# Patient Record
Sex: FEMALE | Race: WHITE | NOT HISPANIC OR LATINO | Employment: UNEMPLOYED | URBAN - METROPOLITAN AREA
[De-identification: names, ages, dates, MRNs, and addresses within clinical notes are randomized per-mention and may not be internally consistent; named-entity substitution may affect disease eponyms.]

---

## 2017-12-03 ENCOUNTER — HOSPITAL ENCOUNTER (EMERGENCY)
Facility: HOSPITAL | Age: 57
Discharge: HOME | End: 2017-12-03
Attending: EMERGENCY MEDICINE
Payer: COMMERCIAL

## 2017-12-03 VITALS
HEART RATE: 89 BPM | SYSTOLIC BLOOD PRESSURE: 174 MMHG | OXYGEN SATURATION: 93 % | RESPIRATION RATE: 20 BRPM | DIASTOLIC BLOOD PRESSURE: 78 MMHG | TEMPERATURE: 98 F

## 2017-12-03 DIAGNOSIS — J11.1 INFLUENZA: ICD-10-CM

## 2017-12-03 DIAGNOSIS — J01.00 ACUTE MAXILLARY SINUSITIS, RECURRENCE NOT SPECIFIED: Primary | ICD-10-CM

## 2017-12-03 DIAGNOSIS — J40 BRONCHITIS: ICD-10-CM

## 2017-12-03 DIAGNOSIS — R07.9 CHEST PAIN: ICD-10-CM

## 2017-12-03 DIAGNOSIS — R06.02 SOB (SHORTNESS OF BREATH): ICD-10-CM

## 2017-12-03 DIAGNOSIS — R06.2 WHEEZING: ICD-10-CM

## 2017-12-03 LAB
ALBUMIN SERPL BCP-MCNC: 3.8 G/DL
ALP SERPL-CCNC: 94 U/L
ALT SERPL W/O P-5'-P-CCNC: 23 U/L
ANION GAP SERPL CALC-SCNC: 11 MMOL/L
AST SERPL-CCNC: 14 U/L
BASOPHILS # BLD AUTO: 0.02 K/UL
BASOPHILS NFR BLD: 0.3 %
BILIRUB SERPL-MCNC: 0.2 MG/DL
BUN SERPL-MCNC: 12 MG/DL
CALCIUM SERPL-MCNC: 8.7 MG/DL
CHLORIDE SERPL-SCNC: 102 MMOL/L
CO2 SERPL-SCNC: 23 MMOL/L
CREAT SERPL-MCNC: 0.61 MG/DL
DIFFERENTIAL METHOD: ABNORMAL
EOSINOPHIL # BLD AUTO: 0 K/UL
EOSINOPHIL NFR BLD: 0 %
ERYTHROCYTE [DISTWIDTH] IN BLOOD BY AUTOMATED COUNT: 16.8 %
EST. GFR  (AFRICAN AMERICAN): >60 ML/MIN/1.73 M^2
EST. GFR  (NON AFRICAN AMERICAN): >60 ML/MIN/1.73 M^2
GLUCOSE SERPL-MCNC: 151 MG/DL
HCT VFR BLD AUTO: 33.6 %
HGB BLD-MCNC: 10.5 G/DL
LYMPHOCYTES # BLD AUTO: 0.2 K/UL
LYMPHOCYTES NFR BLD: 2.8 %
MCH RBC QN AUTO: 25.1 PG
MCHC RBC AUTO-ENTMCNC: 31.3 G/DL
MCV RBC AUTO: 80 FL
MONOCYTES # BLD AUTO: 0.3 K/UL
MONOCYTES NFR BLD: 5 %
NEUTROPHILS # BLD AUTO: 5.8 K/UL
NEUTROPHILS NFR BLD: 91.6 %
NT-PROBNP: 312 PG/ML
PLATELET # BLD AUTO: 221 K/UL
PMV BLD AUTO: 10.3 FL
POTASSIUM SERPL-SCNC: 3 MMOL/L
PROT SERPL-MCNC: 7 G/DL
RBC # BLD AUTO: 4.18 M/UL
SODIUM SERPL-SCNC: 136 MMOL/L
TROPONIN I SERPL DL<=0.01 NG/ML-MCNC: <0.012 NG/ML
WBC # BLD AUTO: 6.35 K/UL

## 2017-12-03 PROCEDURE — 93010 ELECTROCARDIOGRAM REPORT: CPT | Mod: ,,, | Performed by: INTERNAL MEDICINE

## 2017-12-03 PROCEDURE — 96365 THER/PROPH/DIAG IV INF INIT: CPT

## 2017-12-03 PROCEDURE — 25000003 PHARM REV CODE 250: Performed by: EMERGENCY MEDICINE

## 2017-12-03 PROCEDURE — 85025 COMPLETE CBC W/AUTO DIFF WBC: CPT

## 2017-12-03 PROCEDURE — 99285 EMERGENCY DEPT VISIT HI MDM: CPT | Mod: 25

## 2017-12-03 PROCEDURE — 83880 ASSAY OF NATRIURETIC PEPTIDE: CPT

## 2017-12-03 PROCEDURE — 80053 COMPREHEN METABOLIC PANEL: CPT

## 2017-12-03 PROCEDURE — 96361 HYDRATE IV INFUSION ADD-ON: CPT

## 2017-12-03 PROCEDURE — 84484 ASSAY OF TROPONIN QUANT: CPT

## 2017-12-03 PROCEDURE — 96375 TX/PRO/DX INJ NEW DRUG ADDON: CPT

## 2017-12-03 PROCEDURE — 63600175 PHARM REV CODE 636 W HCPCS: Performed by: EMERGENCY MEDICINE

## 2017-12-03 PROCEDURE — 93005 ELECTROCARDIOGRAM TRACING: CPT

## 2017-12-03 PROCEDURE — 96366 THER/PROPH/DIAG IV INF ADDON: CPT

## 2017-12-03 PROCEDURE — 63600175 PHARM REV CODE 636 W HCPCS

## 2017-12-03 RX ORDER — MORPHINE SULFATE 2 MG/ML
4 INJECTION, SOLUTION INTRAMUSCULAR; INTRAVENOUS
Status: COMPLETED | OUTPATIENT
Start: 2017-12-03 | End: 2017-12-03

## 2017-12-03 RX ORDER — PREDNISONE 50 MG/1
50 TABLET ORAL DAILY
Qty: 10 TABLET | Refills: 0 | Status: SHIPPED | OUTPATIENT
Start: 2017-12-03 | End: 2017-12-08

## 2017-12-03 RX ORDER — ALBUTEROL SULFATE 90 UG/1
1-2 AEROSOL, METERED RESPIRATORY (INHALATION) EVERY 6 HOURS PRN
Qty: 1 INHALER | Refills: 0 | Status: SHIPPED | OUTPATIENT
Start: 2017-12-03 | End: 2020-09-17 | Stop reason: SDUPTHER

## 2017-12-03 RX ORDER — POTASSIUM CHLORIDE 20 MEQ/15ML
60 SOLUTION ORAL
Status: COMPLETED | OUTPATIENT
Start: 2017-12-03 | End: 2017-12-03

## 2017-12-03 RX ORDER — MORPHINE SULFATE 10 MG/ML
INJECTION INTRAMUSCULAR; INTRAVENOUS; SUBCUTANEOUS
Status: COMPLETED
Start: 2017-12-03 | End: 2017-12-03

## 2017-12-03 RX ORDER — ACETAMINOPHEN 500 MG
1000 TABLET ORAL
Status: COMPLETED | OUTPATIENT
Start: 2017-12-03 | End: 2017-12-03

## 2017-12-03 RX ORDER — ONDANSETRON 2 MG/ML
4 INJECTION INTRAMUSCULAR; INTRAVENOUS
Status: COMPLETED | OUTPATIENT
Start: 2017-12-03 | End: 2017-12-03

## 2017-12-03 RX ORDER — OSELTAMIVIR PHOSPHATE 75 MG/1
75 CAPSULE ORAL 2 TIMES DAILY
Qty: 10 CAPSULE | Refills: 0 | Status: SHIPPED | OUTPATIENT
Start: 2017-12-03 | End: 2017-12-08

## 2017-12-03 RX ORDER — MAGNESIUM SULFATE 1 G/100ML
2 INJECTION INTRAVENOUS ONCE
Status: COMPLETED | OUTPATIENT
Start: 2017-12-03 | End: 2017-12-03

## 2017-12-03 RX ORDER — BENZONATATE 100 MG/1
100 CAPSULE ORAL 3 TIMES DAILY PRN
Qty: 20 CAPSULE | Refills: 0 | Status: SHIPPED | OUTPATIENT
Start: 2017-12-03 | End: 2017-12-13

## 2017-12-03 RX ORDER — LORATADINE 10 MG/1
10 TABLET ORAL DAILY
Qty: 60 TABLET | Refills: 0 | Status: SHIPPED | OUTPATIENT
Start: 2017-12-03 | End: 2024-01-12

## 2017-12-03 RX ORDER — AZITHROMYCIN 250 MG/1
250 TABLET, FILM COATED ORAL DAILY
Qty: 6 TABLET | Refills: 0 | Status: SHIPPED | OUTPATIENT
Start: 2017-12-03 | End: 2017-12-13

## 2017-12-03 RX ORDER — HYDROCODONE BITARTRATE AND ACETAMINOPHEN 5; 325 MG/1; MG/1
1 TABLET ORAL EVERY 4 HOURS PRN
Qty: 18 TABLET | Refills: 0 | Status: ON HOLD | OUTPATIENT
Start: 2017-12-03 | End: 2021-06-30

## 2017-12-03 RX ADMIN — ACETAMINOPHEN 1000 MG: 500 TABLET ORAL at 12:12

## 2017-12-03 RX ADMIN — SODIUM CHLORIDE 1000 ML: 0.9 INJECTION, SOLUTION INTRAVENOUS at 12:12

## 2017-12-03 RX ADMIN — MAGNESIUM SULFATE 1 G: 1 INJECTION INTRAVENOUS at 03:12

## 2017-12-03 RX ADMIN — MORPHINE SULFATE 4 MG: 10 INJECTION INTRAVENOUS at 03:12

## 2017-12-03 RX ADMIN — POTASSIUM CHLORIDE 60 MEQ: 20 SOLUTION ORAL at 03:12

## 2017-12-03 RX ADMIN — ONDANSETRON 4 MG: 2 INJECTION INTRAMUSCULAR; INTRAVENOUS at 03:12

## 2017-12-03 NOTE — ED PROVIDER NOTES
Encounter Date: 12/3/2017       History     Chief Complaint   Patient presents with    Shortness of Breath    Sore Throat    Chills    Cough    Headache    Fever    Influenza    Nausea    Vomiting    Weakness     Patient is since the ER from urgent care for one day history of cough fever shortness of breath.  Patient tested positive for influenza and wheezing on exam and EMS was called en route patient received Solu-Medrol and 3 nebs treatments.  Patient also reports having a left sinus pain for 2 weeks.  Patient denies chest pain edema vomiting or diarrhea.          Review of patient's allergies indicates:   Allergen Reactions    Penicillins Hives     No past medical history on file.  Past Surgical History:   Procedure Laterality Date    gastric by pass      HYSTERECTOMY      OOPHORECTOMY Right      No family history on file.  Social History   Substance Use Topics    Smoking status: Former Smoker    Smokeless tobacco: Never Used    Alcohol use No     Review of Systems   Constitutional: Positive for fever. Negative for chills, diaphoresis and fatigue.   HENT: Positive for congestion, sinus pain and sinus pressure. Negative for sore throat.    Respiratory: Positive for cough, shortness of breath and wheezing. Negative for apnea, choking, chest tightness and stridor.    Cardiovascular: Negative for chest pain, palpitations and leg swelling.   Gastrointestinal: Negative for abdominal distention, abdominal pain, diarrhea, nausea and vomiting.   Genitourinary: Negative for dysuria.   Musculoskeletal: Negative for back pain, neck pain and neck stiffness.   Skin: Negative for rash.   Neurological: Negative for weakness.   Hematological: Does not bruise/bleed easily.   All other systems reviewed and are negative.      Physical Exam     Initial Vitals [12/03/17 1240]   BP Pulse Resp Temp SpO2   (!) 172/81 (!) 115 (!) 22 (!) 102.6 °F (39.2 °C) (!) 92 %      MAP       111.33         Physical  Exam    Constitutional: She appears well-developed and well-nourished. She is not diaphoretic. She appears distressed.   HENT:   Head: Normocephalic and atraumatic.   Eyes: Conjunctivae and EOM are normal. Pupils are equal, round, and reactive to light.   Neck: Normal range of motion. Neck supple.   Neck supple   Cardiovascular: Normal rate, regular rhythm and normal heart sounds.   Pulmonary/Chest: No respiratory distress. She has wheezes. She has no rhonchi. She exhibits no tenderness.   Abdominal: Soft. She exhibits no distension and no mass. There is no tenderness. There is no rebound and no guarding.   Musculoskeletal: Normal range of motion. She exhibits no edema or tenderness.   Neurological: She is alert and oriented to person, place, and time. No cranial nerve deficit.   Skin: Skin is warm.         ED Course   Procedures  Labs Reviewed   CBC W/ AUTO DIFFERENTIAL - Abnormal; Notable for the following:        Result Value    Hemoglobin 10.5 (*)     Hematocrit 33.6 (*)     MCV 80 (*)     MCH 25.1 (*)     MCHC 31.3 (*)     RDW 16.8 (*)     Lymph # 0.2 (*)     Gran% 91.6 (*)     Lymph% 2.8 (*)     All other components within normal limits   COMPREHENSIVE METABOLIC PANEL - Abnormal; Notable for the following:     Potassium 3.0 (*)     Glucose 151 (*)     AST 14 (*)     All other components within normal limits   TROPONIN I   NT-PRO NATRIURETIC PEPTIDE        Imaging Results          X-Ray Chest 1 View (Final result)  Result time 12/03/17 13:14:13    Final result by Jesus Caceres MD (12/03/17 13:14:13)                 Impression:     Negative      Electronically signed by: JESUS CACERES MD  Date:     12/03/17  Time:    13:14              Narrative:    History: Shortness of breath    Normal heart size. No infiltrates seen.                            Labs Reviewed   CBC W/ AUTO DIFFERENTIAL - Abnormal; Notable for the following:        Result Value    Hemoglobin 10.5 (*)     Hematocrit 33.6 (*)     MCV 80 (*)      MCH 25.1 (*)     MCHC 31.3 (*)     RDW 16.8 (*)     Lymph # 0.2 (*)     Gran% 91.6 (*)     Lymph% 2.8 (*)     All other components within normal limits   COMPREHENSIVE METABOLIC PANEL - Abnormal; Notable for the following:     Potassium 3.0 (*)     Glucose 151 (*)     AST 14 (*)     All other components within normal limits   TROPONIN I   NT-PRO NATRIURETIC PEPTIDE                              ED Course      Clinical Impression:   The primary encounter diagnosis was Acute maxillary sinusitis, recurrence not specified. Diagnoses of Chest pain, SOB (shortness of breath), Wheezing, Bronchitis, and Influenza were also pertinent to this visit.    Disposition:   Disposition: Discharged  Condition: Stable                        Shahzad Cano MD  12/03/17 5730

## 2017-12-03 NOTE — ED NOTES
Pt presents from urgent care with positive Flu test. Pt c/o head and body aches fever nausea vomited x1 today . Pt had 3 neb tx's at urgent care. MD at bedside.

## 2019-10-01 ENCOUNTER — TELEPHONE (OUTPATIENT)
Dept: GASTROENTEROLOGY | Facility: CLINIC | Age: 59
End: 2019-10-01

## 2020-09-17 ENCOUNTER — HOSPITAL ENCOUNTER (EMERGENCY)
Facility: HOSPITAL | Age: 60
Discharge: HOME | End: 2020-09-17
Attending: EMERGENCY MEDICINE
Payer: COMMERCIAL

## 2020-09-17 VITALS
TEMPERATURE: 98 F | WEIGHT: 280 LBS | DIASTOLIC BLOOD PRESSURE: 74 MMHG | RESPIRATION RATE: 18 BRPM | HEART RATE: 88 BPM | OXYGEN SATURATION: 100 % | HEIGHT: 67 IN | BODY MASS INDEX: 43.95 KG/M2 | SYSTOLIC BLOOD PRESSURE: 162 MMHG

## 2020-09-17 DIAGNOSIS — R06.02 SHORTNESS OF BREATH: ICD-10-CM

## 2020-09-17 LAB
ALBUMIN SERPL BCP-MCNC: 3.7 G/DL (ref 3.5–5.2)
ALP SERPL-CCNC: 84 U/L (ref 38–126)
ALT SERPL W/O P-5'-P-CCNC: 17 U/L (ref 10–44)
ANION GAP SERPL CALC-SCNC: 5 MMOL/L (ref 8–16)
AST SERPL-CCNC: 20 U/L (ref 15–46)
BASOPHILS # BLD AUTO: 0.04 K/UL (ref 0–0.2)
BASOPHILS NFR BLD: 0.5 % (ref 0–1.9)
BILIRUB SERPL-MCNC: 0.3 MG/DL (ref 0.1–1)
BUN SERPL-MCNC: 16 MG/DL (ref 7–17)
CALCIUM SERPL-MCNC: 8.9 MG/DL (ref 8.7–10.5)
CHLORIDE SERPL-SCNC: 108 MMOL/L (ref 95–110)
CO2 SERPL-SCNC: 26 MMOL/L (ref 23–29)
CREAT SERPL-MCNC: 0.69 MG/DL (ref 0.5–1.4)
DIFFERENTIAL METHOD: ABNORMAL
EOSINOPHIL # BLD AUTO: 0.3 K/UL (ref 0–0.5)
EOSINOPHIL NFR BLD: 3.7 % (ref 0–8)
ERYTHROCYTE [DISTWIDTH] IN BLOOD BY AUTOMATED COUNT: 15.9 % (ref 11.5–14.5)
EST. GFR  (AFRICAN AMERICAN): >60 ML/MIN/1.73 M^2
EST. GFR  (NON AFRICAN AMERICAN): >60 ML/MIN/1.73 M^2
GLUCOSE SERPL-MCNC: 98 MG/DL (ref 70–110)
HCT VFR BLD AUTO: 38.9 % (ref 37–48.5)
HGB BLD-MCNC: 12.3 G/DL (ref 12–16)
IMM GRANULOCYTES # BLD AUTO: 0.02 K/UL (ref 0–0.04)
IMM GRANULOCYTES NFR BLD AUTO: 0.2 % (ref 0–0.5)
LYMPHOCYTES # BLD AUTO: 1.8 K/UL (ref 1–4.8)
LYMPHOCYTES NFR BLD: 20.8 % (ref 18–48)
MCH RBC QN AUTO: 29.4 PG (ref 27–31)
MCHC RBC AUTO-ENTMCNC: 31.6 G/DL (ref 32–36)
MCV RBC AUTO: 93 FL (ref 82–98)
MONOCYTES # BLD AUTO: 0.6 K/UL (ref 0.3–1)
MONOCYTES NFR BLD: 6.9 % (ref 4–15)
NEUTROPHILS # BLD AUTO: 6 K/UL (ref 1.8–7.7)
NEUTROPHILS NFR BLD: 67.9 % (ref 38–73)
NRBC BLD-RTO: 0 /100 WBC
NT-PROBNP: 245 PG/ML (ref 5–900)
PLATELET # BLD AUTO: 193 K/UL (ref 150–350)
PMV BLD AUTO: 10.6 FL (ref 9.2–12.9)
POTASSIUM SERPL-SCNC: 4.1 MMOL/L (ref 3.5–5.1)
PROT SERPL-MCNC: 6.8 G/DL (ref 6–8.4)
RBC # BLD AUTO: 4.18 M/UL (ref 4–5.4)
SODIUM SERPL-SCNC: 139 MMOL/L (ref 136–145)
TROPONIN I SERPL DL<=0.01 NG/ML-MCNC: <0.012 NG/ML (ref 0.01–0.03)
WBC # BLD AUTO: 8.81 K/UL (ref 3.9–12.7)

## 2020-09-17 PROCEDURE — 80053 COMPREHEN METABOLIC PANEL: CPT | Mod: ER

## 2020-09-17 PROCEDURE — 85025 COMPLETE CBC W/AUTO DIFF WBC: CPT | Mod: ER

## 2020-09-17 PROCEDURE — 84484 ASSAY OF TROPONIN QUANT: CPT | Mod: ER

## 2020-09-17 PROCEDURE — 93010 EKG 12-LEAD: ICD-10-PCS | Mod: ,,, | Performed by: INTERNAL MEDICINE

## 2020-09-17 PROCEDURE — 93010 ELECTROCARDIOGRAM REPORT: CPT | Mod: ,,, | Performed by: INTERNAL MEDICINE

## 2020-09-17 PROCEDURE — 93005 ELECTROCARDIOGRAM TRACING: CPT | Mod: ER

## 2020-09-17 PROCEDURE — 99285 EMERGENCY DEPT VISIT HI MDM: CPT | Mod: 25,ER

## 2020-09-17 PROCEDURE — U0003 INFECTIOUS AGENT DETECTION BY NUCLEIC ACID (DNA OR RNA); SEVERE ACUTE RESPIRATORY SYNDROME CORONAVIRUS 2 (SARS-COV-2) (CORONAVIRUS DISEASE [COVID-19]), AMPLIFIED PROBE TECHNIQUE, MAKING USE OF HIGH THROUGHPUT TECHNOLOGIES AS DESCRIBED BY CMS-2020-01-R: HCPCS | Mod: ER

## 2020-09-17 PROCEDURE — 25500020 PHARM REV CODE 255: Mod: ER | Performed by: EMERGENCY MEDICINE

## 2020-09-17 PROCEDURE — 83880 ASSAY OF NATRIURETIC PEPTIDE: CPT | Mod: ER

## 2020-09-17 RX ORDER — LISINOPRIL AND HYDROCHLOROTHIAZIDE 12.5; 2 MG/1; MG/1
TABLET ORAL
COMMUNITY
End: 2022-04-18 | Stop reason: SDUPTHER

## 2020-09-17 RX ORDER — ALBUTEROL SULFATE 90 UG/1
1-2 AEROSOL, METERED RESPIRATORY (INHALATION) EVERY 6 HOURS PRN
Qty: 8 G | Refills: 1 | Status: SHIPPED | OUTPATIENT
Start: 2020-09-17 | End: 2021-09-17

## 2020-09-17 RX ORDER — ALBUTEROL SULFATE 2.5 MG/.5ML
2.5 SOLUTION RESPIRATORY (INHALATION) EVERY 4 HOURS PRN
Qty: 1 EACH | Refills: 1 | Status: ON HOLD | OUTPATIENT
Start: 2020-09-17 | End: 2023-12-31

## 2020-09-17 RX ORDER — CYCLOBENZAPRINE HCL 10 MG
10 TABLET ORAL 3 TIMES DAILY PRN
Qty: 15 TABLET | Refills: 0 | Status: SHIPPED | OUTPATIENT
Start: 2020-09-17 | End: 2020-09-22

## 2020-09-17 RX ORDER — PHENTERMINE HYDROCHLORIDE 37.5 MG/1
TABLET ORAL
Status: ON HOLD | COMMUNITY
End: 2021-06-30

## 2020-09-17 RX ORDER — GUAIFENESIN/DEXTROMETHORPHAN 100-10MG/5
5 SYRUP ORAL 4 TIMES DAILY PRN
Qty: 120 ML | Refills: 0 | Status: SHIPPED | OUTPATIENT
Start: 2020-09-17 | End: 2020-09-27

## 2020-09-17 RX ORDER — SUCRALFATE 1 G/1
TABLET ORAL
COMMUNITY

## 2020-09-17 RX ORDER — ERGOCALCIFEROL 1.25 MG/1
CAPSULE ORAL
COMMUNITY

## 2020-09-17 RX ADMIN — IOHEXOL 100 ML: 350 INJECTION, SOLUTION INTRAVENOUS at 08:09

## 2020-09-17 NOTE — ED PROVIDER NOTES
Encounter Date: 9/17/2020        COVID Statement  The [place] [employer] and Ced Phelps, Governor of the [facility], have declared a State of Public Health Emergency due to the spread of a novel coronavirus and disease (COVID-19).  There is no currently accepted treatment except conservative measures and respiratory support if appropriate.  This has lead to significant resource capacity and potential delays in care.          History     Chief Complaint   Patient presents with    Shortness of Breath     Pt with c/o SOB x3 weeks with increased SOB over the last 4 days.      60-year-old female presents to the emergency department for evaluation of 3 week history of shortness of breath.  She reports that 3 weeks ago she began to have intermittent shortness of breath and went to an urgent care.  She reports that she was diagnosed with pneumonia and given antibiotics.  She reports that she has had a continued, intermittent mild cough.  She reports that she finished the antibiotics and has had continued intermittent shortness of breath since that time.  She denies any chest pain, palpitations, productive cough, fever, headache, dizziness, neck pain, abdominal pain, nausea, vomiting, flank pain or dysuria.  She denies any leg swelling, recent surgery, recent travel or oral birth control.  She denies any sick contacts.  Patient reports that she has had 2 COVID test in the past which have both been negative.        Review of patient's allergies indicates:   Allergen Reactions    Penicillins Hives     Past Medical History:   Diagnosis Date    Anxiety     History of stomach ulcers     Hypertension      Past Surgical History:   Procedure Laterality Date    gastric by pass      HYSTERECTOMY      OOPHORECTOMY Right      History reviewed. No pertinent family history.  Social History     Tobacco Use    Smoking status: Current Every Day Smoker     Packs/day: 1.00     Types: Cigarettes     Smokeless tobacco: Never Used   Substance Use Topics    Alcohol use: No    Drug use: No     Review of Systems   Constitutional: Negative for activity change, appetite change and fever.   HENT: Negative for congestion, ear pain, postnasal drip, rhinorrhea, sore throat, trouble swallowing and voice change.    Eyes: Negative for photophobia and visual disturbance.   Respiratory: Positive for cough and shortness of breath. Negative for chest tightness and wheezing.    Cardiovascular: Negative for chest pain.   Gastrointestinal: Negative for abdominal pain, constipation, diarrhea, nausea and vomiting.   Genitourinary: Negative for decreased urine volume, dysuria, flank pain and frequency.   Musculoskeletal: Negative for back pain, joint swelling, neck pain and neck stiffness.   Skin: Negative for rash.   Neurological: Negative for dizziness, syncope, weakness, light-headedness, numbness and headaches.       Physical Exam     Initial Vitals [09/17/20 1743]   BP Pulse Resp Temp SpO2   (!) 141/91 82 20 98.6 °F (37 °C) 96 %      MAP       --         Physical Exam    Nursing note and vitals reviewed.  Constitutional: She appears well-developed and well-nourished. She is not diaphoretic. No distress.   HENT:   Head: Normocephalic and atraumatic.   Right Ear: External ear normal.   Left Ear: External ear normal.   Nose: Nose normal.   Mouth/Throat: Oropharynx is clear and moist.   Eyes: Conjunctivae and EOM are normal. Pupils are equal, round, and reactive to light.   Neck: Normal range of motion. Neck supple.   Cardiovascular: Normal rate, regular rhythm and normal heart sounds.   Pulmonary/Chest: Breath sounds normal. No respiratory distress. She has no wheezes. She has no rhonchi. She has no rales. She exhibits no tenderness.   Abdominal: Soft. Bowel sounds are normal. She exhibits no distension. There is no abdominal tenderness. There is no rebound.   Lymphadenopathy:     She has no cervical adenopathy.   Neurological:  She is alert and oriented to person, place, and time.   Skin: Skin is warm and dry.   Psychiatric: She has a normal mood and affect.         ED Course   Procedures  Labs Reviewed   CBC W/ AUTO DIFFERENTIAL - Abnormal; Notable for the following components:       Result Value    Mean Corpuscular Hemoglobin Conc 31.6 (*)     RDW 15.9 (*)     All other components within normal limits   COMPREHENSIVE METABOLIC PANEL - Abnormal; Notable for the following components:    Anion Gap 5 (*)     All other components within normal limits   TROPONIN I   NT-PRO NATRIURETIC PEPTIDE   SARS-COV-2 (COVID-19) QUALITATIVE PCR     EKG Readings: (Independently Interpreted)   Initial Reading: No STEMI. Rhythm: Normal Sinus Rhythm. Heart Rate: 84.       Imaging Results          CTA Chest Non-Coronary (PE Study) (Final result)  Result time 09/17/20 20:49:05    Final result by Chano Hermosillo Jr., MD (09/17/20 20:49:05)                 Impression:      1. No evidence of pulmonary embolism.  2. Scattered areas of centrilobular emphysema, very mild.  No confluent airspace opacity or dominant pulmonary nodules.      Electronically signed by: Chano Hermosillo Jr., MD  Date:    09/17/2020  Time:    20:49             Narrative:    EXAMINATION:  CTA CHEST NON CORONARY    CLINICAL HISTORY:  PE suspected, high pretest prob;    TECHNIQUE:  CT angiogram of the chest protocol performed after the IV administration of 100 mL Omnipaque 350. 3D reconstructions/reformats/MIPs obtained. All CT scans at this facility use dose modulation, iterative reconstruction, and/or weight based dosing when appropriate to reduce radiation dose to as low as reasonably achievable.    COMPARISON:  None    FINDINGS:  There is no evidence of pulmonary embolism. Scattered areas of centrilobular emphysema.  Bandlike area of scarring or atelectasis involving the lingula.  No pleural fluid, pleural thickening, or pneumothorax. No pathologically enlarged lymph nodes are within the chest.  Normal caliber of the aorta.  Normal size of the heart. Multilevel degenerative osteophytosis of the thoracic spine.  Limited images through the upper abdomen demonstrate no acute findings.                               X-Ray Chest AP Portable (Final result)  Result time 09/17/20 18:45:39    Final result by Chano Hermosillo Jr., MD (09/17/20 18:45:39)                 Impression:      No acute findings.      Electronically signed by: Chano Hermosillo Jr., MD  Date:    09/17/2020  Time:    18:45             Narrative:    EXAMINATION:  XR CHEST AP PORTABLE    CLINICAL HISTORY:  Shortness of breath    COMPARISON:  Prior radiograph from December 3, 2017.    FINDINGS:  Lungs are clear.  No pleural fluid or pneumothorax.  Heart size within normal limits.  No significant bony findings.                                 Medical Decision Making:   Initial Assessment:   60-year-old female presents to the emergency department for evaluation of mild intermittent shortness of breath and mild cough.  Physical exam reveals a nontoxic-appearing female in no acute distress.  Patient is afebrile vital signs within normal limits.  Neurological exam reveals an alert and oriented patient.  Neck is supple, no meningeal signs noted.  Lungs clear to auscultation bilaterally.  No respiratory distress or accessory muscle use noted.  Patient is not  hypoxic, tachycardic or tachypneic.  Abdominal exam reveals soft abdomen, nontender to palpation.  No CVA tenderness noted.  Differential Diagnosis:   Chest x-ray ordered to assess for possible pneumonia or consolidation  ACS  COVID-19  Viral URI  I carefully considered but doubt serious etiology including PE  ED Management:  EKG reveal no acute ST changes.  CBC reveals no acute leukocytosis or anemia.  CMP results within normal limits.  Troponin less than 0.012.  .  Routine COVID screening pending.  Chest x-ray report reveals no acute findings.  Patient reports continued shortness of breath  symptoms.  CT a ordered to assess for possible PE.  Discussed this patient at length with Dr. Alcantar who will assume continued care of the patient while she is in the emergency department.              Attending Attestation:     Physician Attestation Statement for NP/PA:   I have conducted a face to face encounter with this patient in addition to the NP/PA, due to NP/PA Request    Other NP/PA Attestation Additions:    History of Present Illness: 60-year-old female presents to the emergency department for evaluation of 3 week history of shortness of breath.   At urgent care she was diagnosed with PNA and given abx.    Continues to have intermittent cough and SOB.   She denies any chest pain, palpitations, productive cough, fever, headache, dizziness, neck pain, abdominal pain, nausea, vomiting, flank pain or dysuria.  She denies any leg swelling, recent surgery, recent travel or oral birth control.  She denies any sick contacts.    Patient reports that she has had 2 COVID tests in the past which have both been negative.  She does c/o some upper back pain only while coughing   Physical Exam: VSS, NAD, nontoxic appearing  RRR  No respiratory distress, lungs CTAB     Medical Decision Making: CTA chest negative for PE or PNA.   Patient stable for discharge.  Given return precautions.                  ED Course as of Sep 17 2118   Thu Sep 17, 2020   1815 EKG with NSR, rate of 84 bpm.  Normal axis, normal conduction,  ms, TWI in lead V1, normal ST segments. No STEMI    [LD]      ED Course User Index  [LD] Erma Alcantar MD            Clinical Impression:       ICD-10-CM ICD-9-CM   1. Shortness of breath  R06.02 786.05                      Disposition:   Disposition: Discharged  Condition: Stable     ED Disposition Condition    Discharge Stable        ED Prescriptions     Medication Sig Dispense Start Date End Date Auth. Provider    albuterol (PROVENTIL/VENTOLIN HFA) 90 mcg/actuation inhaler Inhale 1-2 puffs  into the lungs every 6 (six) hours as needed for Wheezing. Please give with spacer 8 g 9/17/2020 9/17/2021 Erma Alcantar MD    albuterol sulfate 2.5 mg/0.5 mL Nebu Take 2.5 mg by nebulization every 4 (four) hours as needed. Rescue 1 each 9/17/2020 9/17/2021 Erma Alcantar MD    cyclobenzaprine (FLEXERIL) 10 MG tablet Take 1 tablet (10 mg total) by mouth 3 (three) times daily as needed for Muscle spasms. 15 tablet 9/17/2020 9/22/2020 Erma Alcantar MD    dextromethorphan-guaifenesin  mg/5 ml (ROBITUSSIN-DM)  mg/5 mL liquid Take 5 mLs by mouth 4 (four) times daily as needed (cough). 120 mL 9/17/2020 9/27/2020 Erma Alcantar MD        Follow-up Information     Follow up With Specialties Details Why Contact Info Additional Information    [facility] Call in 1 day to arrange outpatient follow up with primary care physician for reassessment. [address], [address]  [place] [address] [phone] At this time [facility] will only use these entries [facility], [facility], and Emergency Department due to COVID-19 precautions.                                        Susan Teixeira PA-C  09/17/20 2046       Erma Alcantar MD  09/17/20 2115

## 2020-09-18 LAB — SARS-COV-2 RNA RESP QL NAA+PROBE: NOT DETECTED

## 2021-06-30 ENCOUNTER — HOSPITAL ENCOUNTER (OUTPATIENT)
Facility: HOSPITAL | Age: 61
Discharge: HOME | End: 2021-07-05
Attending: EMERGENCY MEDICINE | Admitting: FAMILY MEDICINE
Payer: COMMERCIAL

## 2021-06-30 DIAGNOSIS — J20.9 COPD (CHRONIC OBSTRUCTIVE PULMONARY DISEASE) WITH ACUTE BRONCHITIS: ICD-10-CM

## 2021-06-30 DIAGNOSIS — J44.0 COPD (CHRONIC OBSTRUCTIVE PULMONARY DISEASE) WITH ACUTE BRONCHITIS: ICD-10-CM

## 2021-06-30 DIAGNOSIS — J44.1 COPD WITH ACUTE EXACERBATION: Primary | ICD-10-CM

## 2021-06-30 DIAGNOSIS — R06.02 SHORTNESS OF BREATH: ICD-10-CM

## 2021-06-30 PROBLEM — F17.200 SMOKER: Status: ACTIVE | Noted: 2021-06-30

## 2021-06-30 PROBLEM — E66.01 SEVERE OBESITY (BMI >= 40): Status: ACTIVE | Noted: 2021-06-30

## 2021-06-30 PROBLEM — F41.9 ANXIETY: Status: ACTIVE | Noted: 2021-06-30

## 2021-06-30 PROBLEM — I10 HYPERTENSION: Status: ACTIVE | Noted: 2021-06-30

## 2021-06-30 LAB
ALBUMIN SERPL BCP-MCNC: 4.1 G/DL (ref 3.5–5.2)
ALP SERPL-CCNC: 101 U/L (ref 38–126)
ALT SERPL W/O P-5'-P-CCNC: 23 U/L (ref 10–44)
ANION GAP SERPL CALC-SCNC: 10 MMOL/L (ref 8–16)
AST SERPL-CCNC: 31 U/L (ref 15–46)
BASOPHILS # BLD AUTO: 0.04 K/UL (ref 0–0.2)
BASOPHILS NFR BLD: 0.7 % (ref 0–1.9)
BILIRUB SERPL-MCNC: 0.2 MG/DL (ref 0.1–1)
BILIRUB UR QL STRIP: NEGATIVE
CALCIUM SERPL-MCNC: 8.8 MG/DL (ref 8.7–10.5)
CHLORIDE SERPL-SCNC: 100 MMOL/L (ref 95–110)
CK SERPL-CCNC: 47 U/L (ref 55–170)
CLARITY UR REFRACT.AUTO: CLEAR
CO2 SERPL-SCNC: 24 MMOL/L (ref 23–29)
COLOR UR AUTO: YELLOW
CREAT SERPL-MCNC: 0.94 MG/DL (ref 0.5–1.4)
CRP SERPL-MCNC: 0.55 MG/DL (ref 0–1)
DIFFERENTIAL METHOD: ABNORMAL
EOSINOPHIL # BLD AUTO: 0.1 K/UL (ref 0–0.5)
EOSINOPHIL NFR BLD: 1 % (ref 0–8)
ERYTHROCYTE [DISTWIDTH] IN BLOOD BY AUTOMATED COUNT: 14.3 % (ref 11.5–14.5)
EST. GFR  (AFRICAN AMERICAN): >60 ML/MIN/1.73 M^2
EST. GFR  (NON AFRICAN AMERICAN): >60 ML/MIN/1.73 M^2
FERRITIN SERPL-MCNC: 33 NG/ML (ref 20–300)
GLUCOSE SERPL-MCNC: 113 MG/DL (ref 70–110)
GLUCOSE UR QL STRIP: NEGATIVE
HCT VFR BLD AUTO: 43.7 % (ref 37–48.5)
HGB BLD-MCNC: 13.9 G/DL (ref 12–16)
HGB UR QL STRIP: NEGATIVE
IMM GRANULOCYTES # BLD AUTO: 0.02 K/UL (ref 0–0.04)
IMM GRANULOCYTES NFR BLD AUTO: 0.3 % (ref 0–0.5)
KETONES UR QL STRIP: NEGATIVE
LACTATE SERPL-SCNC: 0.9 MMOL/L (ref 0.5–2.2)
LACTATE SERPL-SCNC: 2.7 MMOL/L (ref 0.5–2.2)
LDH SERPL L TO P-CCNC: 222 U/L (ref 110–260)
LEUKOCYTE ESTERASE UR QL STRIP: NEGATIVE
LYMPHOCYTES # BLD AUTO: 0.8 K/UL (ref 1–4.8)
LYMPHOCYTES NFR BLD: 13 % (ref 18–48)
MAGNESIUM SERPL-MCNC: 2 MG/DL (ref 1.6–2.6)
MCH RBC QN AUTO: 29.3 PG (ref 27–31)
MCHC RBC AUTO-ENTMCNC: 31.8 G/DL (ref 32–36)
MCV RBC AUTO: 92 FL (ref 82–98)
MONOCYTES # BLD AUTO: 0.5 K/UL (ref 0.3–1)
MONOCYTES NFR BLD: 8 % (ref 4–15)
NEUTROPHILS # BLD AUTO: 4.6 K/UL (ref 1.8–7.7)
NEUTROPHILS NFR BLD: 77 % (ref 38–73)
NITRITE UR QL STRIP: NEGATIVE
NRBC BLD-RTO: 0 /100 WBC
NT-PROBNP SERPL-MCNC: 104 PG/ML (ref 5–900)
PH UR STRIP: 5 [PH] (ref 5–8)
PLATELET # BLD AUTO: 142 K/UL (ref 150–450)
PMV BLD AUTO: 10.8 FL (ref 9.2–12.9)
POTASSIUM SERPL-SCNC: 3.4 MMOL/L (ref 3.5–5.1)
PROCALCITONIN SERPL IA-MCNC: 0.33 NG/ML
PROT SERPL-MCNC: 7.3 G/DL (ref 6–8.4)
PROT UR QL STRIP: ABNORMAL
RBC # BLD AUTO: 4.75 M/UL (ref 4–5.4)
SARS-COV-2 RDRP RESP QL NAA+PROBE: NEGATIVE
SODIUM SERPL-SCNC: 134 MMOL/L (ref 136–145)
SP GR UR STRIP: 1.02 (ref 1–1.03)
TROPONIN I SERPL-MCNC: <0.012 NG/ML (ref 0.01–0.03)
URN SPEC COLLECT METH UR: ABNORMAL
UROBILINOGEN UR STRIP-ACNC: NEGATIVE EU/DL
UUN UR-MCNC: 16 MG/DL (ref 7–17)
WBC # BLD AUTO: 5.99 K/UL (ref 3.9–12.7)

## 2021-06-30 PROCEDURE — 80053 COMPREHEN METABOLIC PANEL: CPT | Mod: ER | Performed by: EMERGENCY MEDICINE

## 2021-06-30 PROCEDURE — 63600175 PHARM REV CODE 636 W HCPCS: Mod: ER | Performed by: EMERGENCY MEDICINE

## 2021-06-30 PROCEDURE — 83615 LACTATE (LD) (LDH) ENZYME: CPT | Performed by: EMERGENCY MEDICINE

## 2021-06-30 PROCEDURE — 94640 AIRWAY INHALATION TREATMENT: CPT | Mod: ER

## 2021-06-30 PROCEDURE — 83880 ASSAY OF NATRIURETIC PEPTIDE: CPT | Mod: ER | Performed by: EMERGENCY MEDICINE

## 2021-06-30 PROCEDURE — 87040 BLOOD CULTURE FOR BACTERIA: CPT | Mod: ER | Performed by: EMERGENCY MEDICINE

## 2021-06-30 PROCEDURE — 93005 ELECTROCARDIOGRAM TRACING: CPT | Mod: ER

## 2021-06-30 PROCEDURE — 82728 ASSAY OF FERRITIN: CPT | Performed by: EMERGENCY MEDICINE

## 2021-06-30 PROCEDURE — 96375 TX/PRO/DX INJ NEW DRUG ADDON: CPT | Mod: ER

## 2021-06-30 PROCEDURE — 25000003 PHARM REV CODE 250: Mod: ER | Performed by: NURSE PRACTITIONER

## 2021-06-30 PROCEDURE — G0378 HOSPITAL OBSERVATION PER HR: HCPCS

## 2021-06-30 PROCEDURE — 63600175 PHARM REV CODE 636 W HCPCS: Performed by: NURSE PRACTITIONER

## 2021-06-30 PROCEDURE — 96372 THER/PROPH/DIAG INJ SC/IM: CPT | Mod: 59

## 2021-06-30 PROCEDURE — 25000242 PHARM REV CODE 250 ALT 637 W/ HCPCS: Mod: ER | Performed by: EMERGENCY MEDICINE

## 2021-06-30 PROCEDURE — 82550 ASSAY OF CK (CPK): CPT | Mod: ER | Performed by: EMERGENCY MEDICINE

## 2021-06-30 PROCEDURE — 96365 THER/PROPH/DIAG IV INF INIT: CPT | Mod: ER

## 2021-06-30 PROCEDURE — 83735 ASSAY OF MAGNESIUM: CPT | Mod: ER | Performed by: EMERGENCY MEDICINE

## 2021-06-30 PROCEDURE — 84484 ASSAY OF TROPONIN QUANT: CPT | Mod: ER | Performed by: EMERGENCY MEDICINE

## 2021-06-30 PROCEDURE — 84145 PROCALCITONIN (PCT): CPT | Performed by: EMERGENCY MEDICINE

## 2021-06-30 PROCEDURE — 25000003 PHARM REV CODE 250: Performed by: NURSE PRACTITIONER

## 2021-06-30 PROCEDURE — 86140 C-REACTIVE PROTEIN: CPT | Mod: ER | Performed by: EMERGENCY MEDICINE

## 2021-06-30 PROCEDURE — 94760 N-INVAS EAR/PLS OXIMETRY 1: CPT | Mod: ER

## 2021-06-30 PROCEDURE — 83605 ASSAY OF LACTIC ACID: CPT | Mod: 91,ER | Performed by: EMERGENCY MEDICINE

## 2021-06-30 PROCEDURE — 93010 EKG 12-LEAD: ICD-10-PCS | Mod: ,,, | Performed by: INTERNAL MEDICINE

## 2021-06-30 PROCEDURE — 96361 HYDRATE IV INFUSION ADD-ON: CPT | Mod: ER

## 2021-06-30 PROCEDURE — 99292 CRITICAL CARE ADDL 30 MIN: CPT | Mod: ER

## 2021-06-30 PROCEDURE — U0002 COVID-19 LAB TEST NON-CDC: HCPCS | Mod: ER | Performed by: EMERGENCY MEDICINE

## 2021-06-30 PROCEDURE — 25000003 PHARM REV CODE 250: Mod: ER | Performed by: EMERGENCY MEDICINE

## 2021-06-30 PROCEDURE — 93010 ELECTROCARDIOGRAM REPORT: CPT | Mod: ,,, | Performed by: INTERNAL MEDICINE

## 2021-06-30 PROCEDURE — 99291 CRITICAL CARE FIRST HOUR: CPT | Mod: 25,ER

## 2021-06-30 PROCEDURE — 81003 URINALYSIS AUTO W/O SCOPE: CPT | Mod: ER | Performed by: EMERGENCY MEDICINE

## 2021-06-30 PROCEDURE — 85025 COMPLETE CBC W/AUTO DIFF WBC: CPT | Mod: ER | Performed by: EMERGENCY MEDICINE

## 2021-06-30 RX ORDER — POLYETHYLENE GLYCOL 3350 17 G/17G
17 POWDER, FOR SOLUTION ORAL DAILY
Status: ON HOLD | COMMUNITY
Start: 2021-01-28 | End: 2021-06-30

## 2021-06-30 RX ORDER — ENOXAPARIN SODIUM 100 MG/ML
40 INJECTION SUBCUTANEOUS EVERY 24 HOURS
Status: DISCONTINUED | OUTPATIENT
Start: 2021-06-30 | End: 2021-07-05 | Stop reason: HOSPADM

## 2021-06-30 RX ORDER — FLUTICASONE PROPIONATE 50 MCG
1 SPRAY, SUSPENSION (ML) NASAL 2 TIMES DAILY
COMMUNITY
Start: 2021-01-07

## 2021-06-30 RX ORDER — LINACLOTIDE 145 UG/1
145 CAPSULE, GELATIN COATED ORAL DAILY
COMMUNITY
Start: 2021-06-25

## 2021-06-30 RX ORDER — ROPINIROLE 0.5 MG/1
0.5 TABLET, FILM COATED ORAL NIGHTLY
COMMUNITY
Start: 2021-06-17 | End: 2021-10-28 | Stop reason: SDUPTHER

## 2021-06-30 RX ORDER — IPRATROPIUM BROMIDE AND ALBUTEROL SULFATE 2.5; .5 MG/3ML; MG/3ML
3 SOLUTION RESPIRATORY (INHALATION)
Status: COMPLETED | OUTPATIENT
Start: 2021-06-30 | End: 2021-06-30

## 2021-06-30 RX ORDER — IPRATROPIUM BROMIDE AND ALBUTEROL SULFATE 2.5; .5 MG/3ML; MG/3ML
3 SOLUTION RESPIRATORY (INHALATION) EVERY 4 HOURS PRN
Status: DISCONTINUED | OUTPATIENT
Start: 2021-06-30 | End: 2021-07-05 | Stop reason: HOSPADM

## 2021-06-30 RX ORDER — GABAPENTIN 100 MG/1
CAPSULE ORAL
COMMUNITY
Start: 2021-06-17 | End: 2021-11-25 | Stop reason: SDUPTHER

## 2021-06-30 RX ORDER — MESALAMINE 1.2 G/1
2.4 TABLET, DELAYED RELEASE ORAL DAILY
Status: ON HOLD | COMMUNITY
Start: 2021-06-25 | End: 2021-06-30

## 2021-06-30 RX ORDER — ACETAMINOPHEN 325 MG/1
650 TABLET ORAL
Status: COMPLETED | OUTPATIENT
Start: 2021-06-30 | End: 2021-06-30

## 2021-06-30 RX ORDER — METOPROLOL TARTRATE 25 MG/1
25 TABLET, FILM COATED ORAL 2 TIMES DAILY
Status: DISCONTINUED | OUTPATIENT
Start: 2021-06-30 | End: 2021-07-05 | Stop reason: HOSPADM

## 2021-06-30 RX ORDER — SUCRALFATE 1 G/1
1 TABLET ORAL 2 TIMES DAILY
Status: DISCONTINUED | OUTPATIENT
Start: 2021-06-30 | End: 2021-07-05 | Stop reason: HOSPADM

## 2021-06-30 RX ORDER — RABEPRAZOLE SODIUM 20 MG/1
20 TABLET, DELAYED RELEASE ORAL DAILY
Status: ON HOLD | COMMUNITY
Start: 2021-05-31 | End: 2021-06-30

## 2021-06-30 RX ORDER — PREDNISONE 20 MG/1
40 TABLET ORAL DAILY
Status: DISCONTINUED | OUTPATIENT
Start: 2021-07-01 | End: 2021-07-02

## 2021-06-30 RX ORDER — GUAIFENESIN 100 MG/5ML
200 SOLUTION ORAL EVERY 4 HOURS PRN
Status: DISCONTINUED | OUTPATIENT
Start: 2021-06-30 | End: 2021-07-01

## 2021-06-30 RX ORDER — METOPROLOL TARTRATE 25 MG/1
25 TABLET, FILM COATED ORAL 2 TIMES DAILY
COMMUNITY
Start: 2021-06-17 | End: 2023-06-28

## 2021-06-30 RX ORDER — HYOSCYAMINE SULFATE 0.125 MG
TABLET ORAL
Status: ON HOLD | COMMUNITY
Start: 2021-05-31 | End: 2023-12-28

## 2021-06-30 RX ORDER — SERTRALINE HYDROCHLORIDE 100 MG/1
100 TABLET, FILM COATED ORAL DAILY
COMMUNITY
Start: 2021-06-15 | End: 2022-01-19 | Stop reason: SDUPTHER

## 2021-06-30 RX ORDER — FLUTICASONE PROPIONATE 50 MCG
1 SPRAY, SUSPENSION (ML) NASAL 2 TIMES DAILY
Status: DISCONTINUED | OUTPATIENT
Start: 2021-06-30 | End: 2021-07-01

## 2021-06-30 RX ORDER — FUROSEMIDE 40 MG/1
40 TABLET ORAL DAILY
COMMUNITY
Start: 2021-05-31 | End: 2022-06-02 | Stop reason: SDUPTHER

## 2021-06-30 RX ORDER — BACLOFEN 10 MG/1
TABLET ORAL
Status: ON HOLD | COMMUNITY
Start: 2021-06-17 | End: 2021-06-30

## 2021-06-30 RX ORDER — ONDANSETRON 2 MG/ML
4 INJECTION INTRAMUSCULAR; INTRAVENOUS
Status: COMPLETED | OUTPATIENT
Start: 2021-06-30 | End: 2021-06-30

## 2021-06-30 RX ORDER — POTASSIUM CHLORIDE 1500 MG/1
20 TABLET, EXTENDED RELEASE ORAL DAILY
Status: ON HOLD | COMMUNITY
Start: 2021-06-17 | End: 2021-06-30

## 2021-06-30 RX ORDER — ONDANSETRON 2 MG/ML
4 INJECTION INTRAMUSCULAR; INTRAVENOUS EVERY 6 HOURS PRN
Status: DISCONTINUED | OUTPATIENT
Start: 2021-06-30 | End: 2021-07-05 | Stop reason: HOSPADM

## 2021-06-30 RX ORDER — OSELTAMIVIR PHOSPHATE 75 MG/1
75 CAPSULE ORAL 2 TIMES DAILY
Status: ON HOLD | COMMUNITY
Start: 2021-03-12 | End: 2021-06-30

## 2021-06-30 RX ORDER — PROMETHAZINE HYDROCHLORIDE AND DEXTROMETHORPHAN HYDROBROMIDE 6.25; 15 MG/5ML; MG/5ML
5 SYRUP ORAL EVERY 6 HOURS PRN
Status: ON HOLD | COMMUNITY
Start: 2021-01-07 | End: 2021-06-30

## 2021-06-30 RX ORDER — CETIRIZINE HYDROCHLORIDE 10 MG/1
10 TABLET ORAL DAILY
COMMUNITY
Start: 2021-06-17

## 2021-06-30 RX ORDER — AZITHROMYCIN 250 MG/1
250 TABLET, FILM COATED ORAL
Status: ON HOLD | COMMUNITY
Start: 2021-01-07 | End: 2021-06-30

## 2021-06-30 RX ORDER — TALC
6 POWDER (GRAM) TOPICAL NIGHTLY PRN
Status: DISCONTINUED | OUTPATIENT
Start: 2021-06-30 | End: 2021-07-05 | Stop reason: HOSPADM

## 2021-06-30 RX ORDER — SODIUM CHLORIDE 0.9 % (FLUSH) 0.9 %
10 SYRINGE (ML) INJECTION
Status: DISCONTINUED | OUTPATIENT
Start: 2021-06-30 | End: 2021-07-05 | Stop reason: HOSPADM

## 2021-06-30 RX ORDER — PREDNISONE 20 MG/1
40 TABLET ORAL
Status: COMPLETED | OUTPATIENT
Start: 2021-06-30 | End: 2021-06-30

## 2021-06-30 RX ORDER — ONDANSETRON 8 MG/1
8 TABLET, ORALLY DISINTEGRATING ORAL 3 TIMES DAILY
COMMUNITY
Start: 2021-06-17 | End: 2021-08-26

## 2021-06-30 RX ORDER — ACETAMINOPHEN 325 MG/1
650 TABLET ORAL EVERY 4 HOURS PRN
Status: DISCONTINUED | OUTPATIENT
Start: 2021-06-30 | End: 2021-07-05 | Stop reason: HOSPADM

## 2021-06-30 RX ORDER — ROPINIROLE 0.25 MG/1
0.5 TABLET, FILM COATED ORAL NIGHTLY
Status: DISCONTINUED | OUTPATIENT
Start: 2021-06-30 | End: 2021-07-05 | Stop reason: HOSPADM

## 2021-06-30 RX ORDER — SERTRALINE HYDROCHLORIDE 50 MG/1
100 TABLET, FILM COATED ORAL DAILY
Status: DISCONTINUED | OUTPATIENT
Start: 2021-07-01 | End: 2021-07-05 | Stop reason: HOSPADM

## 2021-06-30 RX ORDER — IBUPROFEN 800 MG/1
800 TABLET ORAL 3 TIMES DAILY
COMMUNITY
Start: 2021-06-17 | End: 2021-08-26

## 2021-06-30 RX ORDER — CLINDAMYCIN HYDROCHLORIDE 300 MG/1
300 CAPSULE ORAL 3 TIMES DAILY
Status: ON HOLD | COMMUNITY
Start: 2021-06-17 | End: 2021-06-30

## 2021-06-30 RX ADMIN — IPRATROPIUM BROMIDE AND ALBUTEROL SULFATE 3 ML: .5; 2.5 SOLUTION RESPIRATORY (INHALATION) at 04:06

## 2021-06-30 RX ADMIN — AZITHROMYCIN DIHYDRATE 500 MG: 500 INJECTION, POWDER, LYOPHILIZED, FOR SOLUTION INTRAVENOUS at 04:06

## 2021-06-30 RX ADMIN — ROPINIROLE HYDROCHLORIDE 0.5 MG: 0.25 TABLET, FILM COATED ORAL at 10:06

## 2021-06-30 RX ADMIN — ENOXAPARIN SODIUM 40 MG: 40 INJECTION SUBCUTANEOUS at 10:06

## 2021-06-30 RX ADMIN — GUAIFENESIN 200 MG: 200 SOLUTION ORAL at 10:06

## 2021-06-30 RX ADMIN — ACETAMINOPHEN 650 MG: 325 TABLET ORAL at 03:06

## 2021-06-30 RX ADMIN — ACETAMINOPHEN 650 MG: 325 TABLET ORAL at 07:06

## 2021-06-30 RX ADMIN — ONDANSETRON 4 MG: 2 INJECTION INTRAMUSCULAR; INTRAVENOUS at 04:06

## 2021-06-30 RX ADMIN — SUCRALFATE 1 G: 1 TABLET ORAL at 10:06

## 2021-06-30 RX ADMIN — POTASSIUM BICARBONATE 50 MEQ: 977.5 TABLET, EFFERVESCENT ORAL at 05:06

## 2021-06-30 RX ADMIN — PREDNISONE 40 MG: 20 TABLET ORAL at 05:06

## 2021-06-30 RX ADMIN — METOPROLOL TARTRATE 25 MG: 25 TABLET, FILM COATED ORAL at 10:06

## 2021-06-30 RX ADMIN — SODIUM CHLORIDE 1000 ML: 0.9 INJECTION, SOLUTION INTRAVENOUS at 04:06

## 2021-07-01 ENCOUNTER — CLINICAL SUPPORT (OUTPATIENT)
Dept: SMOKING CESSATION | Facility: CLINIC | Age: 61
End: 2021-07-01
Payer: COMMERCIAL

## 2021-07-01 DIAGNOSIS — F17.210 CIGARETTE SMOKER: Primary | ICD-10-CM

## 2021-07-01 PROBLEM — R06.2 WHEEZING: Status: ACTIVE | Noted: 2021-07-01

## 2021-07-01 LAB
ANION GAP SERPL CALC-SCNC: 12 MMOL/L (ref 8–16)
BASOPHILS # BLD AUTO: 0.01 K/UL (ref 0–0.2)
BASOPHILS NFR BLD: 0.2 % (ref 0–1.9)
BUN SERPL-MCNC: 12 MG/DL (ref 6–20)
CALCIUM SERPL-MCNC: 8.3 MG/DL (ref 8.7–10.5)
CHLORIDE SERPL-SCNC: 104 MMOL/L (ref 95–110)
CO2 SERPL-SCNC: 21 MMOL/L (ref 23–29)
CREAT SERPL-MCNC: 0.8 MG/DL (ref 0.5–1.4)
DIFFERENTIAL METHOD: ABNORMAL
EOSINOPHIL # BLD AUTO: 0 K/UL (ref 0–0.5)
EOSINOPHIL NFR BLD: 0 % (ref 0–8)
ERYTHROCYTE [DISTWIDTH] IN BLOOD BY AUTOMATED COUNT: 14.5 % (ref 11.5–14.5)
EST. GFR  (AFRICAN AMERICAN): >60 ML/MIN/1.73 M^2
EST. GFR  (NON AFRICAN AMERICAN): >60 ML/MIN/1.73 M^2
GLUCOSE SERPL-MCNC: 140 MG/DL (ref 70–110)
HCT VFR BLD AUTO: 40.6 % (ref 37–48.5)
HGB BLD-MCNC: 12.9 G/DL (ref 12–16)
IMM GRANULOCYTES # BLD AUTO: 0.02 K/UL (ref 0–0.04)
IMM GRANULOCYTES NFR BLD AUTO: 0.4 % (ref 0–0.5)
LACTATE SERPL-SCNC: 2.1 MMOL/L (ref 0.5–2.2)
LYMPHOCYTES # BLD AUTO: 0.6 K/UL (ref 1–4.8)
LYMPHOCYTES NFR BLD: 11.5 % (ref 18–48)
MAGNESIUM SERPL-MCNC: 2.1 MG/DL (ref 1.6–2.6)
MCH RBC QN AUTO: 29 PG (ref 27–31)
MCHC RBC AUTO-ENTMCNC: 31.8 G/DL (ref 32–36)
MCV RBC AUTO: 91 FL (ref 82–98)
MONOCYTES # BLD AUTO: 0.2 K/UL (ref 0.3–1)
MONOCYTES NFR BLD: 4.6 % (ref 4–15)
NEUTROPHILS # BLD AUTO: 4 K/UL (ref 1.8–7.7)
NEUTROPHILS NFR BLD: 83.3 % (ref 38–73)
NRBC BLD-RTO: 0 /100 WBC
PLATELET # BLD AUTO: 128 K/UL (ref 150–450)
PMV BLD AUTO: 11.3 FL (ref 9.2–12.9)
POCT GLUCOSE: 130 MG/DL (ref 70–110)
POCT GLUCOSE: 134 MG/DL (ref 70–110)
POTASSIUM SERPL-SCNC: 4.1 MMOL/L (ref 3.5–5.1)
RBC # BLD AUTO: 4.45 M/UL (ref 4–5.4)
SODIUM SERPL-SCNC: 137 MMOL/L (ref 136–145)
WBC # BLD AUTO: 4.78 K/UL (ref 3.9–12.7)

## 2021-07-01 PROCEDURE — 83735 ASSAY OF MAGNESIUM: CPT | Performed by: NURSE PRACTITIONER

## 2021-07-01 PROCEDURE — 96366 THER/PROPH/DIAG IV INF ADDON: CPT

## 2021-07-01 PROCEDURE — 99999 PR PBB SHADOW E&M-EST. PATIENT-LVL I: ICD-10-PCS | Mod: PBBFAC,,,

## 2021-07-01 PROCEDURE — 36415 COLL VENOUS BLD VENIPUNCTURE: CPT | Performed by: NURSE PRACTITIONER

## 2021-07-01 PROCEDURE — 96372 THER/PROPH/DIAG INJ SC/IM: CPT

## 2021-07-01 PROCEDURE — 96375 TX/PRO/DX INJ NEW DRUG ADDON: CPT

## 2021-07-01 PROCEDURE — 27000221 HC OXYGEN, UP TO 24 HOURS

## 2021-07-01 PROCEDURE — 94640 AIRWAY INHALATION TREATMENT: CPT

## 2021-07-01 PROCEDURE — G0378 HOSPITAL OBSERVATION PER HR: HCPCS

## 2021-07-01 PROCEDURE — 99900035 HC TECH TIME PER 15 MIN (STAT)

## 2021-07-01 PROCEDURE — 99407 PR TOBACCO USE CESSATION INTENSIVE >10 MINUTES: ICD-10-PCS | Mod: S$GLB,,,

## 2021-07-01 PROCEDURE — 96367 TX/PROPH/DG ADDL SEQ IV INF: CPT

## 2021-07-01 PROCEDURE — 83605 ASSAY OF LACTIC ACID: CPT | Performed by: FAMILY MEDICINE

## 2021-07-01 PROCEDURE — 99407 BEHAV CHNG SMOKING > 10 MIN: CPT | Mod: S$GLB,,,

## 2021-07-01 PROCEDURE — 25000003 PHARM REV CODE 250: Performed by: NURSE PRACTITIONER

## 2021-07-01 PROCEDURE — 63600175 PHARM REV CODE 636 W HCPCS: Performed by: NURSE PRACTITIONER

## 2021-07-01 PROCEDURE — 94761 N-INVAS EAR/PLS OXIMETRY MLT: CPT

## 2021-07-01 PROCEDURE — 99999 PR PBB SHADOW E&M-EST. PATIENT-LVL I: CPT | Mod: PBBFAC,,,

## 2021-07-01 PROCEDURE — 80048 BASIC METABOLIC PNL TOTAL CA: CPT | Performed by: NURSE PRACTITIONER

## 2021-07-01 PROCEDURE — 25000242 PHARM REV CODE 250 ALT 637 W/ HCPCS: Performed by: NURSE PRACTITIONER

## 2021-07-01 PROCEDURE — 85025 COMPLETE CBC W/AUTO DIFF WBC: CPT | Performed by: NURSE PRACTITIONER

## 2021-07-01 PROCEDURE — 36415 COLL VENOUS BLD VENIPUNCTURE: CPT | Performed by: FAMILY MEDICINE

## 2021-07-01 RX ORDER — FLUTICASONE PROPIONATE 50 MCG
2 SPRAY, SUSPENSION (ML) NASAL 2 TIMES DAILY
Status: DISCONTINUED | OUTPATIENT
Start: 2021-07-01 | End: 2021-07-01

## 2021-07-01 RX ORDER — BUTALBITAL, ACETAMINOPHEN AND CAFFEINE 50; 325; 40 MG/1; MG/1; MG/1
1 TABLET ORAL EVERY 6 HOURS PRN
Status: DISCONTINUED | OUTPATIENT
Start: 2021-07-01 | End: 2021-07-01

## 2021-07-01 RX ORDER — SODIUM CHLORIDE 0.9 % (FLUSH) 0.9 %
3 SYRINGE (ML) INJECTION
Status: CANCELLED | OUTPATIENT
Start: 2021-07-01

## 2021-07-01 RX ORDER — BACLOFEN 5 MG/1
10 TABLET ORAL 3 TIMES DAILY PRN
Status: DISCONTINUED | OUTPATIENT
Start: 2021-07-01 | End: 2021-07-05 | Stop reason: HOSPADM

## 2021-07-01 RX ORDER — MAGNESIUM SULFATE HEPTAHYDRATE 40 MG/ML
2 INJECTION, SOLUTION INTRAVENOUS ONCE
Status: COMPLETED | OUTPATIENT
Start: 2021-07-01 | End: 2021-07-01

## 2021-07-01 RX ORDER — CODEINE PHOSPHATE AND GUAIFENESIN 10; 100 MG/5ML; MG/5ML
5 SOLUTION ORAL EVERY 4 HOURS PRN
Status: DISCONTINUED | OUTPATIENT
Start: 2021-07-01 | End: 2021-07-01

## 2021-07-01 RX ORDER — FLUTICASONE PROPIONATE 50 MCG
1 SPRAY, SUSPENSION (ML) NASAL 2 TIMES DAILY
Status: DISCONTINUED | OUTPATIENT
Start: 2021-07-01 | End: 2021-07-05 | Stop reason: HOSPADM

## 2021-07-01 RX ORDER — BUTALBITAL, ACETAMINOPHEN AND CAFFEINE 50; 325; 40 MG/1; MG/1; MG/1
1 TABLET ORAL EVERY 6 HOURS PRN
Status: DISCONTINUED | OUTPATIENT
Start: 2021-07-01 | End: 2021-07-05 | Stop reason: HOSPADM

## 2021-07-01 RX ORDER — MORPHINE SULFATE 2 MG/ML
2 INJECTION, SOLUTION INTRAMUSCULAR; INTRAVENOUS ONCE
Status: COMPLETED | OUTPATIENT
Start: 2021-07-01 | End: 2021-07-01

## 2021-07-01 RX ORDER — SODIUM CHLORIDE 0.9 % (FLUSH) 0.9 %
.1-1 SYRINGE (ML) INJECTION
Status: CANCELLED | OUTPATIENT
Start: 2021-07-01

## 2021-07-01 RX ORDER — GUAIFENESIN/DEXTROMETHORPHAN 100-10MG/5
10 SYRUP ORAL EVERY 4 HOURS PRN
Status: DISCONTINUED | OUTPATIENT
Start: 2021-07-01 | End: 2021-07-04

## 2021-07-01 RX ORDER — IPRATROPIUM BROMIDE AND ALBUTEROL SULFATE 2.5; .5 MG/3ML; MG/3ML
3 SOLUTION RESPIRATORY (INHALATION) EVERY 6 HOURS
Status: DISCONTINUED | OUTPATIENT
Start: 2021-07-01 | End: 2021-07-03

## 2021-07-01 RX ORDER — CETIRIZINE HYDROCHLORIDE 5 MG/1
5 TABLET ORAL DAILY
Status: DISCONTINUED | OUTPATIENT
Start: 2021-07-01 | End: 2021-07-05 | Stop reason: HOSPADM

## 2021-07-01 RX ADMIN — FLUTICASONE PROPIONATE 50 MCG: 50 SPRAY, METERED NASAL at 12:07

## 2021-07-01 RX ADMIN — MORPHINE SULFATE 2 MG: 2 INJECTION, SOLUTION INTRAMUSCULAR; INTRAVENOUS at 03:07

## 2021-07-01 RX ADMIN — IPRATROPIUM BROMIDE AND ALBUTEROL SULFATE 3 ML: .5; 2.5 SOLUTION RESPIRATORY (INHALATION) at 05:07

## 2021-07-01 RX ADMIN — FLUTICASONE PROPIONATE 50 MCG: 50 SPRAY, METERED NASAL at 08:07

## 2021-07-01 RX ADMIN — BUTALBITAL, ACETAMINOPHEN, AND CAFFEINE 1 TABLET: 50; 325; 40 TABLET ORAL at 04:07

## 2021-07-01 RX ADMIN — ENOXAPARIN SODIUM 40 MG: 40 INJECTION SUBCUTANEOUS at 04:07

## 2021-07-01 RX ADMIN — METOPROLOL TARTRATE 25 MG: 25 TABLET, FILM COATED ORAL at 08:07

## 2021-07-01 RX ADMIN — BACLOFEN 10 MG: 5 TABLET ORAL at 08:07

## 2021-07-01 RX ADMIN — PREDNISONE 40 MG: 20 TABLET ORAL at 08:07

## 2021-07-01 RX ADMIN — IPRATROPIUM BROMIDE AND ALBUTEROL SULFATE 3 ML: .5; 3 SOLUTION RESPIRATORY (INHALATION) at 07:07

## 2021-07-01 RX ADMIN — DEXTROSE 250 MG: 5 SOLUTION INTRAVENOUS at 03:07

## 2021-07-01 RX ADMIN — SUCRALFATE 1 G: 1 TABLET ORAL at 08:07

## 2021-07-01 RX ADMIN — GUAIFENESIN AND DEXTROMETHORPHAN 10 ML: 100; 10 SYRUP ORAL at 03:07

## 2021-07-01 RX ADMIN — ACETAMINOPHEN 650 MG: 325 TABLET ORAL at 08:07

## 2021-07-01 RX ADMIN — CETIRIZINE HYDROCHLORIDE 5 MG: 5 TABLET, FILM COATED ORAL at 03:07

## 2021-07-01 RX ADMIN — MELATONIN 6 MG: 3 TAB ORAL at 08:07

## 2021-07-01 RX ADMIN — ROPINIROLE HYDROCHLORIDE 0.5 MG: 0.25 TABLET, FILM COATED ORAL at 08:07

## 2021-07-01 RX ADMIN — MAGNESIUM SULFATE 2 G: 2 INJECTION INTRAVENOUS at 12:07

## 2021-07-01 RX ADMIN — IPRATROPIUM BROMIDE AND ALBUTEROL SULFATE 3 ML: .5; 3 SOLUTION RESPIRATORY (INHALATION) at 01:07

## 2021-07-01 RX ADMIN — SERTRALINE HYDROCHLORIDE 100 MG: 50 TABLET ORAL at 08:07

## 2021-07-02 ENCOUNTER — CLINICAL SUPPORT (OUTPATIENT)
Dept: SMOKING CESSATION | Facility: CLINIC | Age: 61
End: 2021-07-02
Payer: COMMERCIAL

## 2021-07-02 DIAGNOSIS — F17.210 CIGARETTE SMOKER: Primary | ICD-10-CM

## 2021-07-02 PROBLEM — J96.21 ACUTE ON CHRONIC RESPIRATORY FAILURE WITH HYPOXIA: Status: ACTIVE | Noted: 2021-07-02

## 2021-07-02 LAB
ANION GAP SERPL CALC-SCNC: 13 MMOL/L (ref 8–16)
BASOPHILS # BLD AUTO: 0.02 K/UL (ref 0–0.2)
BASOPHILS NFR BLD: 0.4 % (ref 0–1.9)
BUN SERPL-MCNC: 19 MG/DL (ref 6–20)
CALCIUM SERPL-MCNC: 8.6 MG/DL (ref 8.7–10.5)
CHLORIDE SERPL-SCNC: 104 MMOL/L (ref 95–110)
CO2 SERPL-SCNC: 22 MMOL/L (ref 23–29)
CREAT SERPL-MCNC: 0.8 MG/DL (ref 0.5–1.4)
DIFFERENTIAL METHOD: ABNORMAL
EOSINOPHIL # BLD AUTO: 0 K/UL (ref 0–0.5)
EOSINOPHIL NFR BLD: 0.2 % (ref 0–8)
ERYTHROCYTE [DISTWIDTH] IN BLOOD BY AUTOMATED COUNT: 14.5 % (ref 11.5–14.5)
EST. GFR  (AFRICAN AMERICAN): >60 ML/MIN/1.73 M^2
EST. GFR  (NON AFRICAN AMERICAN): >60 ML/MIN/1.73 M^2
GLUCOSE SERPL-MCNC: 100 MG/DL (ref 70–110)
HCT VFR BLD AUTO: 40.2 % (ref 37–48.5)
HGB BLD-MCNC: 12.5 G/DL (ref 12–16)
IMM GRANULOCYTES # BLD AUTO: 0.01 K/UL (ref 0–0.04)
IMM GRANULOCYTES NFR BLD AUTO: 0.2 % (ref 0–0.5)
LYMPHOCYTES # BLD AUTO: 1.4 K/UL (ref 1–4.8)
LYMPHOCYTES NFR BLD: 30.4 % (ref 18–48)
MAGNESIUM SERPL-MCNC: 2.4 MG/DL (ref 1.6–2.6)
MCH RBC QN AUTO: 28.3 PG (ref 27–31)
MCHC RBC AUTO-ENTMCNC: 31.1 G/DL (ref 32–36)
MCV RBC AUTO: 91 FL (ref 82–98)
MONOCYTES # BLD AUTO: 0.4 K/UL (ref 0.3–1)
MONOCYTES NFR BLD: 9.4 % (ref 4–15)
NEUTROPHILS # BLD AUTO: 2.8 K/UL (ref 1.8–7.7)
NEUTROPHILS NFR BLD: 59.4 % (ref 38–73)
NRBC BLD-RTO: 0 /100 WBC
PLATELET # BLD AUTO: 126 K/UL (ref 150–450)
PMV BLD AUTO: 11.4 FL (ref 9.2–12.9)
POCT GLUCOSE: 148 MG/DL (ref 70–110)
POCT GLUCOSE: 168 MG/DL (ref 70–110)
POTASSIUM SERPL-SCNC: 4 MMOL/L (ref 3.5–5.1)
RBC # BLD AUTO: 4.42 M/UL (ref 4–5.4)
SODIUM SERPL-SCNC: 139 MMOL/L (ref 136–145)
WBC # BLD AUTO: 4.67 K/UL (ref 3.9–12.7)

## 2021-07-02 PROCEDURE — 99900035 HC TECH TIME PER 15 MIN (STAT)

## 2021-07-02 PROCEDURE — 25000242 PHARM REV CODE 250 ALT 637 W/ HCPCS: Performed by: NURSE PRACTITIONER

## 2021-07-02 PROCEDURE — 25000003 PHARM REV CODE 250: Performed by: NURSE PRACTITIONER

## 2021-07-02 PROCEDURE — 80048 BASIC METABOLIC PNL TOTAL CA: CPT | Performed by: NURSE PRACTITIONER

## 2021-07-02 PROCEDURE — G0378 HOSPITAL OBSERVATION PER HR: HCPCS

## 2021-07-02 PROCEDURE — 83735 ASSAY OF MAGNESIUM: CPT | Performed by: NURSE PRACTITIONER

## 2021-07-02 PROCEDURE — 96376 TX/PRO/DX INJ SAME DRUG ADON: CPT

## 2021-07-02 PROCEDURE — 96372 THER/PROPH/DIAG INJ SC/IM: CPT

## 2021-07-02 PROCEDURE — 25000003 PHARM REV CODE 250: Performed by: FAMILY MEDICINE

## 2021-07-02 PROCEDURE — 94761 N-INVAS EAR/PLS OXIMETRY MLT: CPT

## 2021-07-02 PROCEDURE — 85025 COMPLETE CBC W/AUTO DIFF WBC: CPT | Performed by: NURSE PRACTITIONER

## 2021-07-02 PROCEDURE — 63600175 PHARM REV CODE 636 W HCPCS: Performed by: NURSE PRACTITIONER

## 2021-07-02 PROCEDURE — S4991 NICOTINE PATCH NONLEGEND: HCPCS | Performed by: FAMILY MEDICINE

## 2021-07-02 PROCEDURE — 99407 PR TOBACCO USE CESSATION INTENSIVE >10 MINUTES: ICD-10-PCS | Mod: S$GLB,,,

## 2021-07-02 PROCEDURE — 99407 BEHAV CHNG SMOKING > 10 MIN: CPT | Mod: S$GLB,,,

## 2021-07-02 PROCEDURE — 94640 AIRWAY INHALATION TREATMENT: CPT

## 2021-07-02 PROCEDURE — 96366 THER/PROPH/DIAG IV INF ADDON: CPT

## 2021-07-02 PROCEDURE — 36415 COLL VENOUS BLD VENIPUNCTURE: CPT | Performed by: NURSE PRACTITIONER

## 2021-07-02 PROCEDURE — 63700000 PHARM REV CODE 250 ALT 637 W/O HCPCS: Performed by: FAMILY MEDICINE

## 2021-07-02 PROCEDURE — 27000221 HC OXYGEN, UP TO 24 HOURS

## 2021-07-02 PROCEDURE — 96375 TX/PRO/DX INJ NEW DRUG ADDON: CPT

## 2021-07-02 RX ORDER — AZITHROMYCIN 250 MG/1
250 TABLET, FILM COATED ORAL DAILY
Status: DISCONTINUED | OUTPATIENT
Start: 2021-07-02 | End: 2021-07-05 | Stop reason: HOSPADM

## 2021-07-02 RX ORDER — IBUPROFEN 200 MG
24 TABLET ORAL
Status: DISCONTINUED | OUTPATIENT
Start: 2021-07-02 | End: 2021-07-05 | Stop reason: HOSPADM

## 2021-07-02 RX ORDER — IBUPROFEN 200 MG
16 TABLET ORAL
Status: DISCONTINUED | OUTPATIENT
Start: 2021-07-02 | End: 2021-07-05 | Stop reason: HOSPADM

## 2021-07-02 RX ORDER — MAGNESIUM SULFATE HEPTAHYDRATE 40 MG/ML
2 INJECTION, SOLUTION INTRAVENOUS ONCE
Status: COMPLETED | OUTPATIENT
Start: 2021-07-02 | End: 2021-07-02

## 2021-07-02 RX ORDER — INSULIN ASPART 100 [IU]/ML
0-5 INJECTION, SOLUTION INTRAVENOUS; SUBCUTANEOUS
Status: DISCONTINUED | OUTPATIENT
Start: 2021-07-02 | End: 2021-07-05 | Stop reason: HOSPADM

## 2021-07-02 RX ORDER — IBUPROFEN 200 MG
1 TABLET ORAL DAILY
Status: DISCONTINUED | OUTPATIENT
Start: 2021-07-02 | End: 2021-07-05 | Stop reason: HOSPADM

## 2021-07-02 RX ORDER — GLUCAGON 1 MG
1 KIT INJECTION
Status: DISCONTINUED | OUTPATIENT
Start: 2021-07-02 | End: 2021-07-05 | Stop reason: HOSPADM

## 2021-07-02 RX ADMIN — ONDANSETRON 4 MG: 2 INJECTION INTRAMUSCULAR; INTRAVENOUS at 08:07

## 2021-07-02 RX ADMIN — FLUTICASONE PROPIONATE 50 MCG: 50 SPRAY, METERED NASAL at 08:07

## 2021-07-02 RX ADMIN — PREDNISONE 40 MG: 20 TABLET ORAL at 07:07

## 2021-07-02 RX ADMIN — IPRATROPIUM BROMIDE AND ALBUTEROL SULFATE 3 ML: .5; 3 SOLUTION RESPIRATORY (INHALATION) at 01:07

## 2021-07-02 RX ADMIN — BUTALBITAL, ACETAMINOPHEN, AND CAFFEINE 1 TABLET: 50; 325; 40 TABLET ORAL at 08:07

## 2021-07-02 RX ADMIN — ACETAMINOPHEN 650 MG: 325 TABLET ORAL at 07:07

## 2021-07-02 RX ADMIN — GUAIFENESIN AND DEXTROMETHORPHAN 10 ML: 100; 10 SYRUP ORAL at 10:07

## 2021-07-02 RX ADMIN — FLUTICASONE PROPIONATE 50 MCG: 50 SPRAY, METERED NASAL at 07:07

## 2021-07-02 RX ADMIN — AZITHROMYCIN MONOHYDRATE 250 MG: 250 TABLET ORAL at 09:07

## 2021-07-02 RX ADMIN — SUCRALFATE 1 G: 1 TABLET ORAL at 08:07

## 2021-07-02 RX ADMIN — ROPINIROLE HYDROCHLORIDE 0.5 MG: 0.25 TABLET, FILM COATED ORAL at 08:07

## 2021-07-02 RX ADMIN — ENOXAPARIN SODIUM 40 MG: 40 INJECTION SUBCUTANEOUS at 04:07

## 2021-07-02 RX ADMIN — METHYLPREDNISOLONE SODIUM SUCCINATE 80 MG: 40 INJECTION, POWDER, FOR SOLUTION INTRAMUSCULAR; INTRAVENOUS at 05:07

## 2021-07-02 RX ADMIN — Medication 1 PATCH: at 09:07

## 2021-07-02 RX ADMIN — BUTALBITAL, ACETAMINOPHEN, AND CAFFEINE 1 TABLET: 50; 325; 40 TABLET ORAL at 02:07

## 2021-07-02 RX ADMIN — IPRATROPIUM BROMIDE AND ALBUTEROL SULFATE 3 ML: .5; 3 SOLUTION RESPIRATORY (INHALATION) at 07:07

## 2021-07-02 RX ADMIN — CETIRIZINE HYDROCHLORIDE 5 MG: 5 TABLET, FILM COATED ORAL at 07:07

## 2021-07-02 RX ADMIN — METHYLPREDNISOLONE SODIUM SUCCINATE 80 MG: 40 INJECTION, POWDER, FOR SOLUTION INTRAMUSCULAR; INTRAVENOUS at 10:07

## 2021-07-02 RX ADMIN — SUCRALFATE 1 G: 1 TABLET ORAL at 07:07

## 2021-07-02 RX ADMIN — BACLOFEN 10 MG: 5 TABLET ORAL at 08:07

## 2021-07-02 RX ADMIN — SERTRALINE HYDROCHLORIDE 100 MG: 50 TABLET ORAL at 07:07

## 2021-07-02 RX ADMIN — GUAIFENESIN AND DEXTROMETHORPHAN 10 ML: 100; 10 SYRUP ORAL at 02:07

## 2021-07-02 RX ADMIN — GUAIFENESIN AND DEXTROMETHORPHAN 10 ML: 100; 10 SYRUP ORAL at 05:07

## 2021-07-02 RX ADMIN — MAGNESIUM SULFATE 2 G: 2 INJECTION INTRAVENOUS at 10:07

## 2021-07-02 RX ADMIN — METOPROLOL TARTRATE 25 MG: 25 TABLET, FILM COATED ORAL at 07:07

## 2021-07-02 RX ADMIN — BUTALBITAL, ACETAMINOPHEN, AND CAFFEINE 1 TABLET: 50; 325; 40 TABLET ORAL at 03:07

## 2021-07-02 RX ADMIN — BUTALBITAL, ACETAMINOPHEN, AND CAFFEINE 1 TABLET: 50; 325; 40 TABLET ORAL at 09:07

## 2021-07-03 LAB
ALLENS TEST: ABNORMAL
ANION GAP SERPL CALC-SCNC: 10 MMOL/L (ref 8–16)
ANISOCYTOSIS BLD QL SMEAR: SLIGHT
BASOPHILS # BLD AUTO: ABNORMAL K/UL (ref 0–0.2)
BASOPHILS NFR BLD: 0 % (ref 0–1.9)
BUN SERPL-MCNC: 28 MG/DL (ref 6–20)
CALCIUM SERPL-MCNC: 8.2 MG/DL (ref 8.7–10.5)
CHLORIDE SERPL-SCNC: 105 MMOL/L (ref 95–110)
CO2 SERPL-SCNC: 20 MMOL/L (ref 23–29)
CREAT SERPL-MCNC: 0.9 MG/DL (ref 0.5–1.4)
DELSYS: ABNORMAL
DIFFERENTIAL METHOD: ABNORMAL
EOSINOPHIL # BLD AUTO: ABNORMAL K/UL (ref 0–0.5)
EOSINOPHIL NFR BLD: 0 % (ref 0–8)
ERYTHROCYTE [DISTWIDTH] IN BLOOD BY AUTOMATED COUNT: 14.4 % (ref 11.5–14.5)
EST. GFR  (AFRICAN AMERICAN): >60 ML/MIN/1.73 M^2
EST. GFR  (NON AFRICAN AMERICAN): >60 ML/MIN/1.73 M^2
GLUCOSE SERPL-MCNC: 137 MG/DL (ref 70–110)
HCO3 UR-SCNC: 21.8 MMOL/L (ref 24–28)
HCT VFR BLD AUTO: 37.9 % (ref 37–48.5)
HGB BLD-MCNC: 12 G/DL (ref 12–16)
IMM GRANULOCYTES # BLD AUTO: ABNORMAL K/UL (ref 0–0.04)
IMM GRANULOCYTES NFR BLD AUTO: ABNORMAL % (ref 0–0.5)
LYMPHOCYTES # BLD AUTO: ABNORMAL K/UL (ref 1–4.8)
LYMPHOCYTES NFR BLD: 6 % (ref 18–48)
MAGNESIUM SERPL-MCNC: 2.4 MG/DL (ref 1.6–2.6)
MCH RBC QN AUTO: 29.1 PG (ref 27–31)
MCHC RBC AUTO-ENTMCNC: 31.7 G/DL (ref 32–36)
MCV RBC AUTO: 92 FL (ref 82–98)
MONOCYTES # BLD AUTO: ABNORMAL K/UL (ref 0.3–1)
MONOCYTES NFR BLD: 0 % (ref 4–15)
NEUTROPHILS NFR BLD: 90 % (ref 38–73)
NEUTS BAND NFR BLD MANUAL: 4 %
NRBC BLD-RTO: 0 /100 WBC
PCO2 BLDA: 40.8 MMHG (ref 35–45)
PH SMN: 7.34 [PH] (ref 7.35–7.45)
PLATELET # BLD AUTO: 142 K/UL (ref 150–450)
PLATELET BLD QL SMEAR: ABNORMAL
PMV BLD AUTO: 11.2 FL (ref 9.2–12.9)
PO2 BLDA: 72 MMHG (ref 80–100)
POC BE: -4 MMOL/L
POC SATURATED O2: 93 % (ref 95–100)
POC TCO2: 23 MMOL/L (ref 23–27)
POCT GLUCOSE: 111 MG/DL (ref 70–110)
POCT GLUCOSE: 140 MG/DL (ref 70–110)
POCT GLUCOSE: 148 MG/DL (ref 70–110)
POCT GLUCOSE: 165 MG/DL (ref 70–110)
POCT GLUCOSE: 245 MG/DL (ref 70–110)
POTASSIUM SERPL-SCNC: 4.3 MMOL/L (ref 3.5–5.1)
RBC # BLD AUTO: 4.12 M/UL (ref 4–5.4)
SAMPLE: ABNORMAL
SITE: ABNORMAL
SODIUM SERPL-SCNC: 135 MMOL/L (ref 136–145)
WBC # BLD AUTO: 4.7 K/UL (ref 3.9–12.7)

## 2021-07-03 PROCEDURE — 25000003 PHARM REV CODE 250: Performed by: FAMILY MEDICINE

## 2021-07-03 PROCEDURE — 25000003 PHARM REV CODE 250: Performed by: NURSE PRACTITIONER

## 2021-07-03 PROCEDURE — 27000221 HC OXYGEN, UP TO 24 HOURS

## 2021-07-03 PROCEDURE — S4991 NICOTINE PATCH NONLEGEND: HCPCS | Performed by: FAMILY MEDICINE

## 2021-07-03 PROCEDURE — 83735 ASSAY OF MAGNESIUM: CPT | Performed by: NURSE PRACTITIONER

## 2021-07-03 PROCEDURE — 80048 BASIC METABOLIC PNL TOTAL CA: CPT | Performed by: NURSE PRACTITIONER

## 2021-07-03 PROCEDURE — 94761 N-INVAS EAR/PLS OXIMETRY MLT: CPT

## 2021-07-03 PROCEDURE — 25000242 PHARM REV CODE 250 ALT 637 W/ HCPCS: Performed by: NURSE PRACTITIONER

## 2021-07-03 PROCEDURE — 96375 TX/PRO/DX INJ NEW DRUG ADDON: CPT

## 2021-07-03 PROCEDURE — 36415 COLL VENOUS BLD VENIPUNCTURE: CPT | Performed by: NURSE PRACTITIONER

## 2021-07-03 PROCEDURE — 63600175 PHARM REV CODE 636 W HCPCS: Performed by: NURSE PRACTITIONER

## 2021-07-03 PROCEDURE — 63700000 PHARM REV CODE 250 ALT 637 W/O HCPCS: Performed by: FAMILY MEDICINE

## 2021-07-03 PROCEDURE — 36600 WITHDRAWAL OF ARTERIAL BLOOD: CPT

## 2021-07-03 PROCEDURE — 82803 BLOOD GASES ANY COMBINATION: CPT

## 2021-07-03 PROCEDURE — 96376 TX/PRO/DX INJ SAME DRUG ADON: CPT

## 2021-07-03 PROCEDURE — 85007 BL SMEAR W/DIFF WBC COUNT: CPT | Mod: NCS | Performed by: NURSE PRACTITIONER

## 2021-07-03 PROCEDURE — 94640 AIRWAY INHALATION TREATMENT: CPT

## 2021-07-03 PROCEDURE — 85027 COMPLETE CBC AUTOMATED: CPT | Performed by: NURSE PRACTITIONER

## 2021-07-03 PROCEDURE — G0378 HOSPITAL OBSERVATION PER HR: HCPCS

## 2021-07-03 PROCEDURE — 94799 UNLISTED PULMONARY SVC/PX: CPT

## 2021-07-03 PROCEDURE — 99900035 HC TECH TIME PER 15 MIN (STAT)

## 2021-07-03 PROCEDURE — 94760 N-INVAS EAR/PLS OXIMETRY 1: CPT

## 2021-07-03 PROCEDURE — 96372 THER/PROPH/DIAG INJ SC/IM: CPT

## 2021-07-03 RX ORDER — IPRATROPIUM BROMIDE AND ALBUTEROL SULFATE 2.5; .5 MG/3ML; MG/3ML
3 SOLUTION RESPIRATORY (INHALATION) EVERY 4 HOURS
Status: DISCONTINUED | OUTPATIENT
Start: 2021-07-03 | End: 2021-07-05 | Stop reason: HOSPADM

## 2021-07-03 RX ORDER — KETOROLAC TROMETHAMINE 30 MG/ML
15 INJECTION, SOLUTION INTRAMUSCULAR; INTRAVENOUS ONCE
Status: COMPLETED | OUTPATIENT
Start: 2021-07-03 | End: 2021-07-03

## 2021-07-03 RX ORDER — LIDOCAINE 50 MG/G
1 PATCH TOPICAL
Status: DISCONTINUED | OUTPATIENT
Start: 2021-07-03 | End: 2021-07-05 | Stop reason: HOSPADM

## 2021-07-03 RX ADMIN — LIDOCAINE 1 PATCH: 50 PATCH TOPICAL at 09:07

## 2021-07-03 RX ADMIN — SERTRALINE HYDROCHLORIDE 100 MG: 50 TABLET ORAL at 08:07

## 2021-07-03 RX ADMIN — IPRATROPIUM BROMIDE AND ALBUTEROL SULFATE 3 ML: .5; 3 SOLUTION RESPIRATORY (INHALATION) at 07:07

## 2021-07-03 RX ADMIN — GUAIFENESIN AND DEXTROMETHORPHAN 10 ML: 100; 10 SYRUP ORAL at 08:07

## 2021-07-03 RX ADMIN — METHYLPREDNISOLONE SODIUM SUCCINATE 80 MG: 40 INJECTION, POWDER, FOR SOLUTION INTRAMUSCULAR; INTRAVENOUS at 05:07

## 2021-07-03 RX ADMIN — MELATONIN 6 MG: 3 TAB ORAL at 09:07

## 2021-07-03 RX ADMIN — FLUTICASONE PROPIONATE 50 MCG: 50 SPRAY, METERED NASAL at 08:07

## 2021-07-03 RX ADMIN — BUTALBITAL, ACETAMINOPHEN, AND CAFFEINE 1 TABLET: 50; 325; 40 TABLET ORAL at 09:07

## 2021-07-03 RX ADMIN — ACETAMINOPHEN 650 MG: 325 TABLET ORAL at 11:07

## 2021-07-03 RX ADMIN — ACETAMINOPHEN 650 MG: 325 TABLET ORAL at 12:07

## 2021-07-03 RX ADMIN — IPRATROPIUM BROMIDE AND ALBUTEROL SULFATE 3 ML: .5; 3 SOLUTION RESPIRATORY (INHALATION) at 08:07

## 2021-07-03 RX ADMIN — GUAIFENESIN AND DEXTROMETHORPHAN 10 ML: 100; 10 SYRUP ORAL at 05:07

## 2021-07-03 RX ADMIN — Medication 1 PATCH: at 08:07

## 2021-07-03 RX ADMIN — GUAIFENESIN AND DEXTROMETHORPHAN 10 ML: 100; 10 SYRUP ORAL at 09:07

## 2021-07-03 RX ADMIN — ROPINIROLE HYDROCHLORIDE 0.5 MG: 0.25 TABLET, FILM COATED ORAL at 09:07

## 2021-07-03 RX ADMIN — BACLOFEN 10 MG: 5 TABLET ORAL at 09:07

## 2021-07-03 RX ADMIN — INSULIN ASPART 1 UNITS: 100 INJECTION, SOLUTION INTRAVENOUS; SUBCUTANEOUS at 09:07

## 2021-07-03 RX ADMIN — AZITHROMYCIN MONOHYDRATE 250 MG: 250 TABLET ORAL at 08:07

## 2021-07-03 RX ADMIN — METHYLPREDNISOLONE SODIUM SUCCINATE 80 MG: 40 INJECTION, POWDER, FOR SOLUTION INTRAMUSCULAR; INTRAVENOUS at 12:07

## 2021-07-03 RX ADMIN — ENOXAPARIN SODIUM 40 MG: 40 INJECTION SUBCUTANEOUS at 05:07

## 2021-07-03 RX ADMIN — METHYLPREDNISOLONE SODIUM SUCCINATE 80 MG: 40 INJECTION, POWDER, FOR SOLUTION INTRAMUSCULAR; INTRAVENOUS at 11:07

## 2021-07-03 RX ADMIN — IPRATROPIUM BROMIDE AND ALBUTEROL SULFATE 3 ML: .5; 3 SOLUTION RESPIRATORY (INHALATION) at 04:07

## 2021-07-03 RX ADMIN — IPRATROPIUM BROMIDE AND ALBUTEROL SULFATE 3 ML: .5; 3 SOLUTION RESPIRATORY (INHALATION) at 11:07

## 2021-07-03 RX ADMIN — CETIRIZINE HYDROCHLORIDE 5 MG: 5 TABLET, FILM COATED ORAL at 08:07

## 2021-07-03 RX ADMIN — KETOROLAC TROMETHAMINE 15 MG: 30 INJECTION, SOLUTION INTRAMUSCULAR; INTRAVENOUS at 01:07

## 2021-07-03 RX ADMIN — FLUTICASONE PROPIONATE 50 MCG: 50 SPRAY, METERED NASAL at 09:07

## 2021-07-03 RX ADMIN — BUTALBITAL, ACETAMINOPHEN, AND CAFFEINE 1 TABLET: 50; 325; 40 TABLET ORAL at 03:07

## 2021-07-03 RX ADMIN — SUCRALFATE 1 G: 1 TABLET ORAL at 09:07

## 2021-07-03 RX ADMIN — METOPROLOL TARTRATE 25 MG: 25 TABLET, FILM COATED ORAL at 09:07

## 2021-07-03 RX ADMIN — IPRATROPIUM BROMIDE AND ALBUTEROL SULFATE 3 ML: .5; 3 SOLUTION RESPIRATORY (INHALATION) at 12:07

## 2021-07-03 RX ADMIN — BACLOFEN 10 MG: 5 TABLET ORAL at 11:07

## 2021-07-03 RX ADMIN — METOPROLOL TARTRATE 25 MG: 25 TABLET, FILM COATED ORAL at 08:07

## 2021-07-03 RX ADMIN — SUCRALFATE 1 G: 1 TABLET ORAL at 08:07

## 2021-07-04 LAB
ANION GAP SERPL CALC-SCNC: 12 MMOL/L (ref 8–16)
BASOPHILS # BLD AUTO: 0.01 K/UL (ref 0–0.2)
BASOPHILS NFR BLD: 0.1 % (ref 0–1.9)
BUN SERPL-MCNC: 34 MG/DL (ref 6–20)
CALCIUM SERPL-MCNC: 8.5 MG/DL (ref 8.7–10.5)
CHLORIDE SERPL-SCNC: 106 MMOL/L (ref 95–110)
CO2 SERPL-SCNC: 20 MMOL/L (ref 23–29)
CREAT SERPL-MCNC: 0.9 MG/DL (ref 0.5–1.4)
DIFFERENTIAL METHOD: ABNORMAL
EOSINOPHIL # BLD AUTO: 0 K/UL (ref 0–0.5)
EOSINOPHIL NFR BLD: 0 % (ref 0–8)
ERYTHROCYTE [DISTWIDTH] IN BLOOD BY AUTOMATED COUNT: 14.3 % (ref 11.5–14.5)
EST. GFR  (AFRICAN AMERICAN): >60 ML/MIN/1.73 M^2
EST. GFR  (NON AFRICAN AMERICAN): >60 ML/MIN/1.73 M^2
GLUCOSE SERPL-MCNC: 130 MG/DL (ref 70–110)
HCT VFR BLD AUTO: 37.7 % (ref 37–48.5)
HGB BLD-MCNC: 12.1 G/DL (ref 12–16)
IMM GRANULOCYTES # BLD AUTO: 0.05 K/UL (ref 0–0.04)
IMM GRANULOCYTES NFR BLD AUTO: 0.6 % (ref 0–0.5)
LYMPHOCYTES # BLD AUTO: 0.9 K/UL (ref 1–4.8)
LYMPHOCYTES NFR BLD: 11.3 % (ref 18–48)
MAGNESIUM SERPL-MCNC: 2.5 MG/DL (ref 1.6–2.6)
MCH RBC QN AUTO: 28.7 PG (ref 27–31)
MCHC RBC AUTO-ENTMCNC: 32.1 G/DL (ref 32–36)
MCV RBC AUTO: 90 FL (ref 82–98)
MONOCYTES # BLD AUTO: 0.2 K/UL (ref 0.3–1)
MONOCYTES NFR BLD: 2 % (ref 4–15)
NEUTROPHILS # BLD AUTO: 6.7 K/UL (ref 1.8–7.7)
NEUTROPHILS NFR BLD: 86 % (ref 38–73)
NRBC BLD-RTO: 0 /100 WBC
PLATELET # BLD AUTO: 165 K/UL (ref 150–450)
PMV BLD AUTO: 11 FL (ref 9.2–12.9)
POCT GLUCOSE: 124 MG/DL (ref 70–110)
POCT GLUCOSE: 125 MG/DL (ref 70–110)
POCT GLUCOSE: 147 MG/DL (ref 70–110)
POCT GLUCOSE: 174 MG/DL (ref 70–110)
POTASSIUM SERPL-SCNC: 4.3 MMOL/L (ref 3.5–5.1)
RBC # BLD AUTO: 4.21 M/UL (ref 4–5.4)
SODIUM SERPL-SCNC: 138 MMOL/L (ref 136–145)
WBC # BLD AUTO: 7.82 K/UL (ref 3.9–12.7)

## 2021-07-04 PROCEDURE — 80048 BASIC METABOLIC PNL TOTAL CA: CPT | Performed by: NURSE PRACTITIONER

## 2021-07-04 PROCEDURE — G0378 HOSPITAL OBSERVATION PER HR: HCPCS

## 2021-07-04 PROCEDURE — 83735 ASSAY OF MAGNESIUM: CPT | Performed by: NURSE PRACTITIONER

## 2021-07-04 PROCEDURE — 63700000 PHARM REV CODE 250 ALT 637 W/O HCPCS: Performed by: FAMILY MEDICINE

## 2021-07-04 PROCEDURE — 96376 TX/PRO/DX INJ SAME DRUG ADON: CPT

## 2021-07-04 PROCEDURE — 25000242 PHARM REV CODE 250 ALT 637 W/ HCPCS: Performed by: NURSE PRACTITIONER

## 2021-07-04 PROCEDURE — 25000003 PHARM REV CODE 250: Performed by: NURSE PRACTITIONER

## 2021-07-04 PROCEDURE — 94761 N-INVAS EAR/PLS OXIMETRY MLT: CPT

## 2021-07-04 PROCEDURE — 94799 UNLISTED PULMONARY SVC/PX: CPT

## 2021-07-04 PROCEDURE — 85025 COMPLETE CBC W/AUTO DIFF WBC: CPT | Performed by: NURSE PRACTITIONER

## 2021-07-04 PROCEDURE — 94640 AIRWAY INHALATION TREATMENT: CPT

## 2021-07-04 PROCEDURE — 97161 PT EVAL LOW COMPLEX 20 MIN: CPT

## 2021-07-04 PROCEDURE — 36415 COLL VENOUS BLD VENIPUNCTURE: CPT | Performed by: NURSE PRACTITIONER

## 2021-07-04 PROCEDURE — 96372 THER/PROPH/DIAG INJ SC/IM: CPT

## 2021-07-04 PROCEDURE — S4991 NICOTINE PATCH NONLEGEND: HCPCS | Performed by: FAMILY MEDICINE

## 2021-07-04 PROCEDURE — 63600175 PHARM REV CODE 636 W HCPCS: Performed by: NURSE PRACTITIONER

## 2021-07-04 PROCEDURE — 27000221 HC OXYGEN, UP TO 24 HOURS

## 2021-07-04 PROCEDURE — 25000003 PHARM REV CODE 250: Performed by: FAMILY MEDICINE

## 2021-07-04 RX ORDER — PREDNISONE 20 MG/1
40 TABLET ORAL DAILY
Status: DISCONTINUED | OUTPATIENT
Start: 2021-07-05 | End: 2021-07-05 | Stop reason: HOSPADM

## 2021-07-04 RX ORDER — PREDNISONE 20 MG/1
40 TABLET ORAL 2 TIMES DAILY
Status: DISCONTINUED | OUTPATIENT
Start: 2021-07-04 | End: 2021-07-04

## 2021-07-04 RX ORDER — CODEINE PHOSPHATE AND GUAIFENESIN 10; 100 MG/5ML; MG/5ML
5 SOLUTION ORAL EVERY 4 HOURS PRN
Status: DISCONTINUED | OUTPATIENT
Start: 2021-07-04 | End: 2021-07-05 | Stop reason: HOSPADM

## 2021-07-04 RX ORDER — FLUTICASONE FUROATE AND VILANTEROL 100; 25 UG/1; UG/1
1 POWDER RESPIRATORY (INHALATION) DAILY
Status: DISCONTINUED | OUTPATIENT
Start: 2021-07-04 | End: 2021-07-05 | Stop reason: HOSPADM

## 2021-07-04 RX ADMIN — IPRATROPIUM BROMIDE AND ALBUTEROL SULFATE 3 ML: .5; 3 SOLUTION RESPIRATORY (INHALATION) at 04:07

## 2021-07-04 RX ADMIN — AZITHROMYCIN MONOHYDRATE 250 MG: 250 TABLET ORAL at 08:07

## 2021-07-04 RX ADMIN — SUCRALFATE 1 G: 1 TABLET ORAL at 08:07

## 2021-07-04 RX ADMIN — IPRATROPIUM BROMIDE AND ALBUTEROL SULFATE 3 ML: .5; 3 SOLUTION RESPIRATORY (INHALATION) at 08:07

## 2021-07-04 RX ADMIN — FLUTICASONE FUROATE AND VILANTEROL TRIFENATATE 1 PUFF: 100; 25 POWDER RESPIRATORY (INHALATION) at 12:07

## 2021-07-04 RX ADMIN — IPRATROPIUM BROMIDE AND ALBUTEROL SULFATE 3 ML: .5; 3 SOLUTION RESPIRATORY (INHALATION) at 11:07

## 2021-07-04 RX ADMIN — BUTALBITAL, ACETAMINOPHEN, AND CAFFEINE 1 TABLET: 50; 325; 40 TABLET ORAL at 01:07

## 2021-07-04 RX ADMIN — GUAIFENESIN AND CODEINE PHOSPHATE 5 ML: 100; 10 SOLUTION ORAL at 11:07

## 2021-07-04 RX ADMIN — BUTALBITAL, ACETAMINOPHEN, AND CAFFEINE 1 TABLET: 50; 325; 40 TABLET ORAL at 09:07

## 2021-07-04 RX ADMIN — MELATONIN 6 MG: 3 TAB ORAL at 09:07

## 2021-07-04 RX ADMIN — Medication 1 PATCH: at 08:07

## 2021-07-04 RX ADMIN — METOPROLOL TARTRATE 25 MG: 25 TABLET, FILM COATED ORAL at 08:07

## 2021-07-04 RX ADMIN — SERTRALINE HYDROCHLORIDE 100 MG: 50 TABLET ORAL at 08:07

## 2021-07-04 RX ADMIN — GUAIFENESIN AND DEXTROMETHORPHAN 10 ML: 100; 10 SYRUP ORAL at 03:07

## 2021-07-04 RX ADMIN — METHYLPREDNISOLONE SODIUM SUCCINATE 80 MG: 40 INJECTION, POWDER, FOR SOLUTION INTRAMUSCULAR; INTRAVENOUS at 11:07

## 2021-07-04 RX ADMIN — GUAIFENESIN AND CODEINE PHOSPHATE 5 ML: 100; 10 SOLUTION ORAL at 05:07

## 2021-07-04 RX ADMIN — IPRATROPIUM BROMIDE AND ALBUTEROL SULFATE 3 ML: .5; 3 SOLUTION RESPIRATORY (INHALATION) at 07:07

## 2021-07-04 RX ADMIN — METHYLPREDNISOLONE SODIUM SUCCINATE 80 MG: 40 INJECTION, POWDER, FOR SOLUTION INTRAMUSCULAR; INTRAVENOUS at 05:07

## 2021-07-04 RX ADMIN — METHYLPREDNISOLONE SODIUM SUCCINATE 80 MG: 40 INJECTION, POWDER, FOR SOLUTION INTRAMUSCULAR; INTRAVENOUS at 12:07

## 2021-07-04 RX ADMIN — SUCRALFATE 1 G: 1 TABLET ORAL at 09:07

## 2021-07-04 RX ADMIN — BACLOFEN 10 MG: 5 TABLET ORAL at 09:07

## 2021-07-04 RX ADMIN — LIDOCAINE 1 PATCH: 50 PATCH TOPICAL at 09:07

## 2021-07-04 RX ADMIN — METOPROLOL TARTRATE 25 MG: 25 TABLET, FILM COATED ORAL at 09:07

## 2021-07-04 RX ADMIN — BUTALBITAL, ACETAMINOPHEN, AND CAFFEINE 1 TABLET: 50; 325; 40 TABLET ORAL at 05:07

## 2021-07-04 RX ADMIN — IPRATROPIUM BROMIDE AND ALBUTEROL SULFATE 3 ML: .5; 3 SOLUTION RESPIRATORY (INHALATION) at 03:07

## 2021-07-04 RX ADMIN — IPRATROPIUM BROMIDE AND ALBUTEROL SULFATE 3 ML: .5; 3 SOLUTION RESPIRATORY (INHALATION) at 12:07

## 2021-07-04 RX ADMIN — GUAIFENESIN AND CODEINE PHOSPHATE 5 ML: 100; 10 SOLUTION ORAL at 09:07

## 2021-07-04 RX ADMIN — CETIRIZINE HYDROCHLORIDE 5 MG: 5 TABLET, FILM COATED ORAL at 08:07

## 2021-07-04 RX ADMIN — ROPINIROLE HYDROCHLORIDE 0.5 MG: 0.25 TABLET, FILM COATED ORAL at 09:07

## 2021-07-04 RX ADMIN — ENOXAPARIN SODIUM 40 MG: 40 INJECTION SUBCUTANEOUS at 05:07

## 2021-07-04 RX ADMIN — FLUTICASONE PROPIONATE 50 MCG: 50 SPRAY, METERED NASAL at 09:07

## 2021-07-05 VITALS
HEART RATE: 76 BPM | WEIGHT: 281.06 LBS | SYSTOLIC BLOOD PRESSURE: 136 MMHG | TEMPERATURE: 98 F | HEIGHT: 67 IN | OXYGEN SATURATION: 96 % | DIASTOLIC BLOOD PRESSURE: 72 MMHG | RESPIRATION RATE: 18 BRPM | BODY MASS INDEX: 44.11 KG/M2

## 2021-07-05 LAB
ANION GAP SERPL CALC-SCNC: 14 MMOL/L (ref 8–16)
BACTERIA BLD CULT: NORMAL
BASOPHILS # BLD AUTO: 0.01 K/UL (ref 0–0.2)
BASOPHILS NFR BLD: 0.1 % (ref 0–1.9)
BUN SERPL-MCNC: 29 MG/DL (ref 6–20)
CALCIUM SERPL-MCNC: 8.4 MG/DL (ref 8.7–10.5)
CHLORIDE SERPL-SCNC: 106 MMOL/L (ref 95–110)
CO2 SERPL-SCNC: 19 MMOL/L (ref 23–29)
CREAT SERPL-MCNC: 0.8 MG/DL (ref 0.5–1.4)
DIFFERENTIAL METHOD: ABNORMAL
EOSINOPHIL # BLD AUTO: 0 K/UL (ref 0–0.5)
EOSINOPHIL NFR BLD: 0 % (ref 0–8)
ERYTHROCYTE [DISTWIDTH] IN BLOOD BY AUTOMATED COUNT: 14.5 % (ref 11.5–14.5)
EST. GFR  (AFRICAN AMERICAN): >60 ML/MIN/1.73 M^2
EST. GFR  (NON AFRICAN AMERICAN): >60 ML/MIN/1.73 M^2
GLUCOSE SERPL-MCNC: 101 MG/DL (ref 70–110)
HCT VFR BLD AUTO: 38.4 % (ref 37–48.5)
HGB BLD-MCNC: 12.2 G/DL (ref 12–16)
IMM GRANULOCYTES # BLD AUTO: 0.08 K/UL (ref 0–0.04)
IMM GRANULOCYTES NFR BLD AUTO: 0.8 % (ref 0–0.5)
LYMPHOCYTES # BLD AUTO: 1.5 K/UL (ref 1–4.8)
LYMPHOCYTES NFR BLD: 14.9 % (ref 18–48)
MAGNESIUM SERPL-MCNC: 2.3 MG/DL (ref 1.6–2.6)
MCH RBC QN AUTO: 28.3 PG (ref 27–31)
MCHC RBC AUTO-ENTMCNC: 31.8 G/DL (ref 32–36)
MCV RBC AUTO: 89 FL (ref 82–98)
MONOCYTES # BLD AUTO: 0.7 K/UL (ref 0.3–1)
MONOCYTES NFR BLD: 6.6 % (ref 4–15)
NEUTROPHILS # BLD AUTO: 7.8 K/UL (ref 1.8–7.7)
NEUTROPHILS NFR BLD: 77.6 % (ref 38–73)
NRBC BLD-RTO: 0 /100 WBC
PLATELET # BLD AUTO: 186 K/UL (ref 150–450)
PMV BLD AUTO: 11 FL (ref 9.2–12.9)
POCT GLUCOSE: 105 MG/DL (ref 70–110)
POCT GLUCOSE: 116 MG/DL (ref 70–110)
POTASSIUM SERPL-SCNC: 4.1 MMOL/L (ref 3.5–5.1)
RBC # BLD AUTO: 4.31 M/UL (ref 4–5.4)
SODIUM SERPL-SCNC: 139 MMOL/L (ref 136–145)
WBC # BLD AUTO: 10.09 K/UL (ref 3.9–12.7)

## 2021-07-05 PROCEDURE — 94761 N-INVAS EAR/PLS OXIMETRY MLT: CPT

## 2021-07-05 PROCEDURE — 25000242 PHARM REV CODE 250 ALT 637 W/ HCPCS: Performed by: NURSE PRACTITIONER

## 2021-07-05 PROCEDURE — 83735 ASSAY OF MAGNESIUM: CPT | Performed by: NURSE PRACTITIONER

## 2021-07-05 PROCEDURE — 36415 COLL VENOUS BLD VENIPUNCTURE: CPT | Performed by: NURSE PRACTITIONER

## 2021-07-05 PROCEDURE — 80048 BASIC METABOLIC PNL TOTAL CA: CPT | Performed by: NURSE PRACTITIONER

## 2021-07-05 PROCEDURE — 96376 TX/PRO/DX INJ SAME DRUG ADON: CPT

## 2021-07-05 PROCEDURE — 94640 AIRWAY INHALATION TREATMENT: CPT

## 2021-07-05 PROCEDURE — S4991 NICOTINE PATCH NONLEGEND: HCPCS | Performed by: FAMILY MEDICINE

## 2021-07-05 PROCEDURE — 63600175 PHARM REV CODE 636 W HCPCS: Performed by: NURSE PRACTITIONER

## 2021-07-05 PROCEDURE — 25000003 PHARM REV CODE 250: Performed by: NURSE PRACTITIONER

## 2021-07-05 PROCEDURE — 25000003 PHARM REV CODE 250: Performed by: FAMILY MEDICINE

## 2021-07-05 PROCEDURE — 97165 OT EVAL LOW COMPLEX 30 MIN: CPT

## 2021-07-05 PROCEDURE — 63700000 PHARM REV CODE 250 ALT 637 W/O HCPCS: Performed by: FAMILY MEDICINE

## 2021-07-05 PROCEDURE — G0378 HOSPITAL OBSERVATION PER HR: HCPCS

## 2021-07-05 PROCEDURE — 97535 SELF CARE MNGMENT TRAINING: CPT

## 2021-07-05 PROCEDURE — 94799 UNLISTED PULMONARY SVC/PX: CPT

## 2021-07-05 PROCEDURE — 85025 COMPLETE CBC W/AUTO DIFF WBC: CPT | Performed by: NURSE PRACTITIONER

## 2021-07-05 RX ORDER — ACETAMINOPHEN 325 MG/1
650 TABLET ORAL EVERY 4 HOURS PRN
Qty: 30 TABLET | Refills: 0 | Status: SHIPPED | OUTPATIENT
Start: 2021-07-05 | End: 2022-09-23 | Stop reason: SDUPTHER

## 2021-07-05 RX ORDER — IBUPROFEN 200 MG
1 TABLET ORAL DAILY
Qty: 30 PATCH | Refills: 1 | Status: ON HOLD | OUTPATIENT
Start: 2021-07-06 | End: 2023-12-31 | Stop reason: HOSPADM

## 2021-07-05 RX ORDER — PREDNISONE 20 MG/1
40 TABLET ORAL DAILY
Qty: 6 TABLET | Refills: 0 | Status: SHIPPED | OUTPATIENT
Start: 2021-07-06 | End: 2021-07-09

## 2021-07-05 RX ORDER — CODEINE PHOSPHATE AND GUAIFENESIN 10; 100 MG/5ML; MG/5ML
5 SOLUTION ORAL EVERY 8 HOURS PRN
Qty: 118 ML | Refills: 0 | Status: ON HOLD | OUTPATIENT
Start: 2021-07-05 | End: 2023-12-31 | Stop reason: HOSPADM

## 2021-07-05 RX ORDER — IBUPROFEN 200 MG
1 TABLET ORAL DAILY
Qty: 30 PATCH | Refills: 1 | Status: SHIPPED | OUTPATIENT
Start: 2021-07-06 | End: 2021-07-05

## 2021-07-05 RX ADMIN — Medication 1 PATCH: at 08:07

## 2021-07-05 RX ADMIN — BUTALBITAL, ACETAMINOPHEN, AND CAFFEINE 1 TABLET: 50; 325; 40 TABLET ORAL at 02:07

## 2021-07-05 RX ADMIN — IPRATROPIUM BROMIDE AND ALBUTEROL SULFATE 3 ML: .5; 3 SOLUTION RESPIRATORY (INHALATION) at 04:07

## 2021-07-05 RX ADMIN — METOPROLOL TARTRATE 25 MG: 25 TABLET, FILM COATED ORAL at 08:07

## 2021-07-05 RX ADMIN — FLUTICASONE FUROATE AND VILANTEROL TRIFENATATE 1 PUFF: 100; 25 POWDER RESPIRATORY (INHALATION) at 07:07

## 2021-07-05 RX ADMIN — IPRATROPIUM BROMIDE AND ALBUTEROL SULFATE 3 ML: .5; 3 SOLUTION RESPIRATORY (INHALATION) at 07:07

## 2021-07-05 RX ADMIN — AZITHROMYCIN MONOHYDRATE 250 MG: 250 TABLET ORAL at 08:07

## 2021-07-05 RX ADMIN — ONDANSETRON 4 MG: 2 INJECTION INTRAMUSCULAR; INTRAVENOUS at 08:07

## 2021-07-05 RX ADMIN — ACETAMINOPHEN 650 MG: 325 TABLET ORAL at 07:07

## 2021-07-05 RX ADMIN — FLUTICASONE PROPIONATE 50 MCG: 50 SPRAY, METERED NASAL at 09:07

## 2021-07-05 RX ADMIN — CETIRIZINE HYDROCHLORIDE 5 MG: 5 TABLET, FILM COATED ORAL at 08:07

## 2021-07-05 RX ADMIN — SUCRALFATE 1 G: 1 TABLET ORAL at 08:07

## 2021-07-05 RX ADMIN — SERTRALINE HYDROCHLORIDE 100 MG: 50 TABLET ORAL at 08:07

## 2021-07-05 RX ADMIN — PREDNISONE 40 MG: 20 TABLET ORAL at 08:07

## 2021-07-05 RX ADMIN — ONDANSETRON 4 MG: 2 INJECTION INTRAMUSCULAR; INTRAVENOUS at 02:07

## 2021-07-05 RX ADMIN — IPRATROPIUM BROMIDE AND ALBUTEROL SULFATE 3 ML: .5; 3 SOLUTION RESPIRATORY (INHALATION) at 11:07

## 2021-07-06 LAB — BACTERIA BLD CULT: NORMAL

## 2021-07-15 ENCOUNTER — TELEPHONE (OUTPATIENT)
Dept: SMOKING CESSATION | Facility: CLINIC | Age: 61
End: 2021-07-15

## 2021-07-19 ENCOUNTER — TELEPHONE (OUTPATIENT)
Dept: SMOKING CESSATION | Facility: CLINIC | Age: 61
End: 2021-07-19

## 2021-08-02 ENCOUNTER — TELEPHONE (OUTPATIENT)
Dept: PRIMARY CARE CLINIC | Facility: CLINIC | Age: 61
End: 2021-08-02

## 2021-08-26 ENCOUNTER — LAB VISIT (OUTPATIENT)
Dept: LAB | Facility: HOSPITAL | Age: 61
End: 2021-08-26
Attending: FAMILY MEDICINE
Payer: COMMERCIAL

## 2021-08-26 ENCOUNTER — TELEPHONE (OUTPATIENT)
Dept: FAMILY MEDICINE | Facility: CLINIC | Age: 61
End: 2021-08-26

## 2021-08-26 ENCOUNTER — OFFICE VISIT (OUTPATIENT)
Dept: FAMILY MEDICINE | Facility: CLINIC | Age: 61
End: 2021-08-26
Payer: COMMERCIAL

## 2021-08-26 VITALS
HEART RATE: 92 BPM | WEIGHT: 282.88 LBS | HEIGHT: 67 IN | SYSTOLIC BLOOD PRESSURE: 124 MMHG | BODY MASS INDEX: 44.4 KG/M2 | OXYGEN SATURATION: 95 % | TEMPERATURE: 98 F | DIASTOLIC BLOOD PRESSURE: 80 MMHG

## 2021-08-26 DIAGNOSIS — J44.9 CHRONIC OBSTRUCTIVE PULMONARY DISEASE, UNSPECIFIED COPD TYPE: ICD-10-CM

## 2021-08-26 DIAGNOSIS — Z11.4 SCREENING FOR HIV WITHOUT PRESENCE OF RISK FACTORS: ICD-10-CM

## 2021-08-26 DIAGNOSIS — Z12.31 OTHER SCREENING MAMMOGRAM: ICD-10-CM

## 2021-08-26 DIAGNOSIS — Z11.59 ENCOUNTER FOR HEPATITIS C SCREENING TEST FOR LOW RISK PATIENT: ICD-10-CM

## 2021-08-26 DIAGNOSIS — Z00.00 ROUTINE GENERAL MEDICAL EXAMINATION AT A HEALTH CARE FACILITY: Primary | ICD-10-CM

## 2021-08-26 LAB
ALBUMIN SERPL BCP-MCNC: 3.4 G/DL (ref 3.5–5.2)
ALP SERPL-CCNC: 81 U/L (ref 55–135)
ALT SERPL W/O P-5'-P-CCNC: 11 U/L (ref 10–44)
ANION GAP SERPL CALC-SCNC: 12 MMOL/L (ref 8–16)
AST SERPL-CCNC: 15 U/L (ref 10–40)
BASOPHILS # BLD AUTO: 0.08 K/UL (ref 0–0.2)
BASOPHILS NFR BLD: 0.9 % (ref 0–1.9)
BILIRUB SERPL-MCNC: 0.2 MG/DL (ref 0.1–1)
BUN SERPL-MCNC: 12 MG/DL (ref 6–20)
CALCIUM SERPL-MCNC: 9.1 MG/DL (ref 8.7–10.5)
CHLORIDE SERPL-SCNC: 105 MMOL/L (ref 95–110)
CHOLEST SERPL-MCNC: 176 MG/DL (ref 120–199)
CHOLEST/HDLC SERPL: 3.2 {RATIO} (ref 2–5)
CO2 SERPL-SCNC: 23 MMOL/L (ref 23–29)
CREAT SERPL-MCNC: 0.9 MG/DL (ref 0.5–1.4)
DIFFERENTIAL METHOD: ABNORMAL
EOSINOPHIL # BLD AUTO: 0.3 K/UL (ref 0–0.5)
EOSINOPHIL NFR BLD: 3.8 % (ref 0–8)
ERYTHROCYTE [DISTWIDTH] IN BLOOD BY AUTOMATED COUNT: 14.7 % (ref 11.5–14.5)
EST. GFR  (AFRICAN AMERICAN): >60 ML/MIN/1.73 M^2
EST. GFR  (NON AFRICAN AMERICAN): >60 ML/MIN/1.73 M^2
ESTIMATED AVG GLUCOSE: 117 MG/DL (ref 68–131)
GLUCOSE SERPL-MCNC: 100 MG/DL (ref 70–110)
HBA1C MFR BLD: 5.7 % (ref 4–5.6)
HCT VFR BLD AUTO: 41.3 % (ref 37–48.5)
HDLC SERPL-MCNC: 55 MG/DL (ref 40–75)
HDLC SERPL: 31.3 % (ref 20–50)
HGB BLD-MCNC: 12.9 G/DL (ref 12–16)
IMM GRANULOCYTES # BLD AUTO: 0.03 K/UL (ref 0–0.04)
IMM GRANULOCYTES NFR BLD AUTO: 0.4 % (ref 0–0.5)
LDLC SERPL CALC-MCNC: 100.4 MG/DL (ref 63–159)
LYMPHOCYTES # BLD AUTO: 1.8 K/UL (ref 1–4.8)
LYMPHOCYTES NFR BLD: 21.2 % (ref 18–48)
MCH RBC QN AUTO: 28 PG (ref 27–31)
MCHC RBC AUTO-ENTMCNC: 31.2 G/DL (ref 32–36)
MCV RBC AUTO: 90 FL (ref 82–98)
MONOCYTES # BLD AUTO: 0.6 K/UL (ref 0.3–1)
MONOCYTES NFR BLD: 7 % (ref 4–15)
NEUTROPHILS # BLD AUTO: 5.6 K/UL (ref 1.8–7.7)
NEUTROPHILS NFR BLD: 66.7 % (ref 38–73)
NONHDLC SERPL-MCNC: 121 MG/DL
NRBC BLD-RTO: 0 /100 WBC
PLATELET # BLD AUTO: 251 K/UL (ref 150–450)
PMV BLD AUTO: 9.8 FL (ref 9.2–12.9)
POTASSIUM SERPL-SCNC: 4.4 MMOL/L (ref 3.5–5.1)
PROT SERPL-MCNC: 6.9 G/DL (ref 6–8.4)
RBC # BLD AUTO: 4.6 M/UL (ref 4–5.4)
SODIUM SERPL-SCNC: 140 MMOL/L (ref 136–145)
TRIGL SERPL-MCNC: 103 MG/DL (ref 30–150)
TSH SERPL DL<=0.005 MIU/L-ACNC: 2.25 UIU/ML (ref 0.4–4)
WBC # BLD AUTO: 8.43 K/UL (ref 3.9–12.7)

## 2021-08-26 PROCEDURE — 87389 HIV-1 AG W/HIV-1&-2 AB AG IA: CPT | Performed by: FAMILY MEDICINE

## 2021-08-26 PROCEDURE — 99999 PR PBB SHADOW E&M-EST. PATIENT-LVL V: ICD-10-PCS | Mod: PBBFAC,,, | Performed by: FAMILY MEDICINE

## 2021-08-26 PROCEDURE — 3074F PR MOST RECENT SYSTOLIC BLOOD PRESSURE < 130 MM HG: ICD-10-PCS | Mod: CPTII,S$GLB,, | Performed by: FAMILY MEDICINE

## 2021-08-26 PROCEDURE — 86803 HEPATITIS C AB TEST: CPT | Performed by: FAMILY MEDICINE

## 2021-08-26 PROCEDURE — 90715 TDAP VACCINE GREATER THAN OR EQUAL TO 7YO IM: ICD-10-PCS | Mod: S$GLB,,, | Performed by: FAMILY MEDICINE

## 2021-08-26 PROCEDURE — 99999 PR PBB SHADOW E&M-EST. PATIENT-LVL V: CPT | Mod: PBBFAC,,, | Performed by: FAMILY MEDICINE

## 2021-08-26 PROCEDURE — 36415 COLL VENOUS BLD VENIPUNCTURE: CPT | Performed by: FAMILY MEDICINE

## 2021-08-26 PROCEDURE — 1160F PR REVIEW ALL MEDS BY PRESCRIBER/CLIN PHARMACIST DOCUMENTED: ICD-10-PCS | Mod: CPTII,S$GLB,, | Performed by: FAMILY MEDICINE

## 2021-08-26 PROCEDURE — 99386 PR PREVENTIVE VISIT,NEW,40-64: ICD-10-PCS | Mod: 25,S$GLB,, | Performed by: FAMILY MEDICINE

## 2021-08-26 PROCEDURE — 1126F PR PAIN SEVERITY QUANTIFIED, NO PAIN PRESENT: ICD-10-PCS | Mod: CPTII,S$GLB,, | Performed by: FAMILY MEDICINE

## 2021-08-26 PROCEDURE — 90471 TDAP VACCINE GREATER THAN OR EQUAL TO 7YO IM: ICD-10-PCS | Mod: S$GLB,,, | Performed by: FAMILY MEDICINE

## 2021-08-26 PROCEDURE — 84443 ASSAY THYROID STIM HORMONE: CPT | Performed by: FAMILY MEDICINE

## 2021-08-26 PROCEDURE — 1159F MED LIST DOCD IN RCRD: CPT | Mod: CPTII,S$GLB,, | Performed by: FAMILY MEDICINE

## 2021-08-26 PROCEDURE — 85025 COMPLETE CBC W/AUTO DIFF WBC: CPT | Performed by: FAMILY MEDICINE

## 2021-08-26 PROCEDURE — 3079F DIAST BP 80-89 MM HG: CPT | Mod: CPTII,S$GLB,, | Performed by: FAMILY MEDICINE

## 2021-08-26 PROCEDURE — 3008F PR BODY MASS INDEX (BMI) DOCUMENTED: ICD-10-PCS | Mod: CPTII,S$GLB,, | Performed by: FAMILY MEDICINE

## 2021-08-26 PROCEDURE — 1126F AMNT PAIN NOTED NONE PRSNT: CPT | Mod: CPTII,S$GLB,, | Performed by: FAMILY MEDICINE

## 2021-08-26 PROCEDURE — 90471 IMMUNIZATION ADMIN: CPT | Mod: S$GLB,,, | Performed by: FAMILY MEDICINE

## 2021-08-26 PROCEDURE — 90715 TDAP VACCINE 7 YRS/> IM: CPT | Mod: S$GLB,,, | Performed by: FAMILY MEDICINE

## 2021-08-26 PROCEDURE — 83036 HEMOGLOBIN GLYCOSYLATED A1C: CPT | Performed by: FAMILY MEDICINE

## 2021-08-26 PROCEDURE — 99386 PREV VISIT NEW AGE 40-64: CPT | Mod: 25,S$GLB,, | Performed by: FAMILY MEDICINE

## 2021-08-26 PROCEDURE — 3008F BODY MASS INDEX DOCD: CPT | Mod: CPTII,S$GLB,, | Performed by: FAMILY MEDICINE

## 2021-08-26 PROCEDURE — 80061 LIPID PANEL: CPT | Performed by: FAMILY MEDICINE

## 2021-08-26 PROCEDURE — 80053 COMPREHEN METABOLIC PANEL: CPT | Performed by: FAMILY MEDICINE

## 2021-08-26 PROCEDURE — 3079F PR MOST RECENT DIASTOLIC BLOOD PRESSURE 80-89 MM HG: ICD-10-PCS | Mod: CPTII,S$GLB,, | Performed by: FAMILY MEDICINE

## 2021-08-26 PROCEDURE — 1160F RVW MEDS BY RX/DR IN RCRD: CPT | Mod: CPTII,S$GLB,, | Performed by: FAMILY MEDICINE

## 2021-08-26 PROCEDURE — 3074F SYST BP LT 130 MM HG: CPT | Mod: CPTII,S$GLB,, | Performed by: FAMILY MEDICINE

## 2021-08-26 PROCEDURE — 1159F PR MEDICATION LIST DOCUMENTED IN MEDICAL RECORD: ICD-10-PCS | Mod: CPTII,S$GLB,, | Performed by: FAMILY MEDICINE

## 2021-08-26 RX ORDER — RABEPRAZOLE SODIUM 20 MG/1
20 TABLET, DELAYED RELEASE ORAL DAILY
COMMUNITY
Start: 2021-07-25

## 2021-08-27 LAB
HCV AB SERPL QL IA: NEGATIVE
HIV 1+2 AB+HIV1 P24 AG SERPL QL IA: NEGATIVE

## 2021-09-09 ENCOUNTER — HOSPITAL ENCOUNTER (OUTPATIENT)
Dept: PULMONOLOGY | Facility: HOSPITAL | Age: 61
Discharge: HOME | End: 2021-09-09
Attending: FAMILY MEDICINE
Payer: COMMERCIAL

## 2021-09-09 ENCOUNTER — TELEPHONE (OUTPATIENT)
Dept: FAMILY MEDICINE | Facility: CLINIC | Age: 61
End: 2021-09-09

## 2021-09-09 DIAGNOSIS — J44.9 CHRONIC OBSTRUCTIVE PULMONARY DISEASE, UNSPECIFIED COPD TYPE: ICD-10-CM

## 2021-09-09 DIAGNOSIS — R06.02 SHORTNESS OF BREATH: ICD-10-CM

## 2021-09-09 DIAGNOSIS — J98.4 RESTRICTIVE LUNG DISEASE: ICD-10-CM

## 2021-09-09 DIAGNOSIS — R05.9 COUGH: Primary | ICD-10-CM

## 2021-09-09 PROCEDURE — 94727 GAS DIL/WSHOT DETER LNG VOL: CPT

## 2021-09-09 PROCEDURE — 94010 BREATHING CAPACITY TEST: CPT

## 2021-09-09 PROCEDURE — 94729 DIFFUSING CAPACITY: CPT

## 2021-09-16 ENCOUNTER — TELEPHONE (OUTPATIENT)
Dept: ADMINISTRATIVE | Facility: OTHER | Age: 61
End: 2021-09-16

## 2021-09-21 ENCOUNTER — HOSPITAL ENCOUNTER (OUTPATIENT)
Dept: RADIOLOGY | Facility: HOSPITAL | Age: 61
Discharge: HOME | End: 2021-09-21
Attending: FAMILY MEDICINE
Payer: COMMERCIAL

## 2021-09-21 DIAGNOSIS — R06.02 SHORTNESS OF BREATH: ICD-10-CM

## 2021-09-21 DIAGNOSIS — J98.4 RESTRICTIVE LUNG DISEASE: ICD-10-CM

## 2021-09-21 DIAGNOSIS — R05.9 COUGH: ICD-10-CM

## 2021-09-21 DIAGNOSIS — Z12.31 OTHER SCREENING MAMMOGRAM: ICD-10-CM

## 2021-09-21 PROCEDURE — 71250 CT THORAX DX C-: CPT | Mod: TC,PO

## 2021-09-21 PROCEDURE — 77067 SCR MAMMO BI INCL CAD: CPT | Mod: TC,PO

## 2021-09-22 ENCOUNTER — PATIENT MESSAGE (OUTPATIENT)
Dept: FAMILY MEDICINE | Facility: CLINIC | Age: 61
End: 2021-09-22

## 2021-09-22 ENCOUNTER — OFFICE VISIT (OUTPATIENT)
Dept: FAMILY MEDICINE | Facility: CLINIC | Age: 61
End: 2021-09-22
Payer: COMMERCIAL

## 2021-09-22 VITALS
HEART RATE: 95 BPM | WEIGHT: 276 LBS | TEMPERATURE: 98 F | OXYGEN SATURATION: 97 % | DIASTOLIC BLOOD PRESSURE: 82 MMHG | HEIGHT: 67 IN | SYSTOLIC BLOOD PRESSURE: 134 MMHG | BODY MASS INDEX: 43.32 KG/M2

## 2021-09-22 DIAGNOSIS — J44.9 CHRONIC OBSTRUCTIVE PULMONARY DISEASE, UNSPECIFIED COPD TYPE: ICD-10-CM

## 2021-09-22 DIAGNOSIS — J98.4 RESTRICTIVE LUNG DISEASE: ICD-10-CM

## 2021-09-22 DIAGNOSIS — R06.02 SHORTNESS OF BREATH: Primary | ICD-10-CM

## 2021-09-22 PROCEDURE — 4010F ACE/ARB THERAPY RXD/TAKEN: CPT | Mod: CPTII,S$GLB,, | Performed by: FAMILY MEDICINE

## 2021-09-22 PROCEDURE — 90471 IMMUNIZATION ADMIN: CPT | Mod: S$GLB,,, | Performed by: FAMILY MEDICINE

## 2021-09-22 PROCEDURE — 99214 PR OFFICE/OUTPT VISIT, EST, LEVL IV, 30-39 MIN: ICD-10-PCS | Mod: 25,S$GLB,, | Performed by: FAMILY MEDICINE

## 2021-09-22 PROCEDURE — 3008F BODY MASS INDEX DOCD: CPT | Mod: CPTII,S$GLB,, | Performed by: FAMILY MEDICINE

## 2021-09-22 PROCEDURE — 3075F SYST BP GE 130 - 139MM HG: CPT | Mod: CPTII,S$GLB,, | Performed by: FAMILY MEDICINE

## 2021-09-22 PROCEDURE — 4010F PR ACE/ARB THEARPY RXD/TAKEN: ICD-10-PCS | Mod: CPTII,S$GLB,, | Performed by: FAMILY MEDICINE

## 2021-09-22 PROCEDURE — 3044F HG A1C LEVEL LT 7.0%: CPT | Mod: CPTII,S$GLB,, | Performed by: FAMILY MEDICINE

## 2021-09-22 PROCEDURE — 3075F PR MOST RECENT SYSTOLIC BLOOD PRESS GE 130-139MM HG: ICD-10-PCS | Mod: CPTII,S$GLB,, | Performed by: FAMILY MEDICINE

## 2021-09-22 PROCEDURE — 3044F PR MOST RECENT HEMOGLOBIN A1C LEVEL <7.0%: ICD-10-PCS | Mod: CPTII,S$GLB,, | Performed by: FAMILY MEDICINE

## 2021-09-22 PROCEDURE — 90732 PNEUMOCOCCAL POLYSACCHARIDE VACCINE 23-VALENT =>2YO SQ IM: ICD-10-PCS | Mod: S$GLB,,, | Performed by: FAMILY MEDICINE

## 2021-09-22 PROCEDURE — 90471 PNEUMOCOCCAL POLYSACCHARIDE VACCINE 23-VALENT =>2YO SQ IM: ICD-10-PCS | Mod: S$GLB,,, | Performed by: FAMILY MEDICINE

## 2021-09-22 PROCEDURE — 99999 PR PBB SHADOW E&M-EST. PATIENT-LVL IV: ICD-10-PCS | Mod: PBBFAC,,, | Performed by: FAMILY MEDICINE

## 2021-09-22 PROCEDURE — 3079F PR MOST RECENT DIASTOLIC BLOOD PRESSURE 80-89 MM HG: ICD-10-PCS | Mod: CPTII,S$GLB,, | Performed by: FAMILY MEDICINE

## 2021-09-22 PROCEDURE — 1159F PR MEDICATION LIST DOCUMENTED IN MEDICAL RECORD: ICD-10-PCS | Mod: CPTII,S$GLB,, | Performed by: FAMILY MEDICINE

## 2021-09-22 PROCEDURE — 90732 PPSV23 VACC 2 YRS+ SUBQ/IM: CPT | Mod: S$GLB,,, | Performed by: FAMILY MEDICINE

## 2021-09-22 PROCEDURE — 3008F PR BODY MASS INDEX (BMI) DOCUMENTED: ICD-10-PCS | Mod: CPTII,S$GLB,, | Performed by: FAMILY MEDICINE

## 2021-09-22 PROCEDURE — 99214 OFFICE O/P EST MOD 30 MIN: CPT | Mod: 25,S$GLB,, | Performed by: FAMILY MEDICINE

## 2021-09-22 PROCEDURE — 99999 PR PBB SHADOW E&M-EST. PATIENT-LVL IV: CPT | Mod: PBBFAC,,, | Performed by: FAMILY MEDICINE

## 2021-09-22 PROCEDURE — 3079F DIAST BP 80-89 MM HG: CPT | Mod: CPTII,S$GLB,, | Performed by: FAMILY MEDICINE

## 2021-09-22 PROCEDURE — 1159F MED LIST DOCD IN RCRD: CPT | Mod: CPTII,S$GLB,, | Performed by: FAMILY MEDICINE

## 2021-09-22 RX ORDER — TIOTROPIUM BROMIDE 18 UG/1
18 CAPSULE ORAL; RESPIRATORY (INHALATION) DAILY
Qty: 30 CAPSULE | Refills: 11 | Status: SHIPPED | OUTPATIENT
Start: 2021-09-22 | End: 2022-11-01 | Stop reason: SDUPTHER

## 2021-09-23 LAB
BRPFT: ABNORMAL
DLCO ADJ PRE: 14.25 ML/(MIN*MMHG) (ref 18.83–30.3)
DLCO SINGLE BREATH LLN: 18.83
DLCO SINGLE BREATH PRE REF: 58 %
DLCO SINGLE BREATH REF: 24.56
DLCOC SBVA LLN: 3.18
DLCOC SBVA PRE REF: 87 %
DLCOC SBVA REF: 4.52
DLCOC SINGLE BREATH LLN: 18.83
DLCOC SINGLE BREATH PRE REF: 58 %
DLCOC SINGLE BREATH REF: 24.56
DLCOVA LLN: 3.18
DLCOVA PRE REF: 87 %
DLCOVA PRE: 3.93 ML/(MIN*MMHG*L) (ref 3.18–5.86)
DLCOVA REF: 4.52
DLVAADJ PRE: 3.93 ML/(MIN*MMHG*L) (ref 3.18–5.86)
ERVN2 LLN: -16449.17
ERVN2 PRE REF: 18.4 %
ERVN2 PRE: 0.15 L (ref -16449.17–16450.83)
ERVN2 REF: 0.83
FEF 25 75 LLN: 1.23
FEF 25 75 PRE REF: 44.8 %
FEF 25 75 REF: 2.41
FEV1 FVC LLN: 67
FEV1 FVC PRE REF: 91.1 %
FEV1 FVC REF: 79
FEV1 LLN: 2.07
FEV1 PRE REF: 60.9 %
FEV1 REF: 2.74
FRCN2 LLN: 2.05
FRCN2 PRE REF: 66.1 %
FRCN2 REF: 2.87
FVC LLN: 2.65
FVC PRE REF: 66.4 %
FVC REF: 3.5
IVC PRE: 2.11 L (ref 2.65–4.35)
IVC SINGLE BREATH LLN: 2.65
IVC SINGLE BREATH PRE REF: 60.3 %
IVC SINGLE BREATH REF: 3.5
PEF LLN: 4.88
PEF PRE REF: 59.3 %
PEF REF: 6.75
PRE DLCO: 14.25 ML/(MIN*MMHG) (ref 18.83–30.3)
PRE FEF 25 75: 1.08 L/S (ref 1.23–3.59)
PRE FET 100: 7.5 SEC
PRE FEV1 FVC: 71.91 % (ref 66.94–90.92)
PRE FEV1: 1.67 L (ref 2.07–3.41)
PRE FRC N2: 1.9 L
PRE FVC: 2.32 L (ref 2.65–4.35)
PRE PEF: 4 L/S (ref 4.88–8.63)
RVN2 LLN: 1.46
RVN2 PRE REF: 81.9 %
RVN2 PRE: 1.67 L (ref 1.46–2.61)
RVN2 REF: 2.04
RVN2TLCN2 LLN: 29.77
RVN2TLCN2 PRE REF: 106.3 %
RVN2TLCN2 PRE: 41.83 % (ref 29.77–48.95)
RVN2TLCN2 REF: 39.36
TLCN2 LLN: 4.44
TLCN2 PRE REF: 73.5 %
TLCN2 PRE: 3.99 L (ref 4.44–6.42)
TLCN2 REF: 5.43
VA PRE: 3.62 L (ref 5.28–5.28)
VA SINGLE BREATH LLN: 5.28
VA SINGLE BREATH PRE REF: 68.6 %
VA SINGLE BREATH REF: 5.28
VCMAXN2 LLN: 2.65
VCMAXN2 PRE REF: 66.4 %
VCMAXN2 PRE: 2.32 L (ref 2.65–4.35)
VCMAXN2 REF: 3.5

## 2021-10-04 ENCOUNTER — PATIENT MESSAGE (OUTPATIENT)
Dept: ADMINISTRATIVE | Facility: HOSPITAL | Age: 61
End: 2021-10-04

## 2021-10-17 ENCOUNTER — PATIENT MESSAGE (OUTPATIENT)
Dept: FAMILY MEDICINE | Facility: CLINIC | Age: 61
End: 2021-10-17
Payer: COMMERCIAL

## 2021-10-17 DIAGNOSIS — R94.2 ABNORMAL PULMONARY FUNCTION: ICD-10-CM

## 2021-10-17 DIAGNOSIS — R06.02 SOB (SHORTNESS OF BREATH): Primary | ICD-10-CM

## 2021-10-20 ENCOUNTER — TELEPHONE (OUTPATIENT)
Dept: FAMILY MEDICINE | Facility: CLINIC | Age: 61
End: 2021-10-20

## 2021-10-28 ENCOUNTER — PATIENT MESSAGE (OUTPATIENT)
Dept: FAMILY MEDICINE | Facility: CLINIC | Age: 61
End: 2021-10-28
Payer: COMMERCIAL

## 2021-10-28 RX ORDER — BACLOFEN 10 MG/1
TABLET ORAL
COMMUNITY
Start: 2021-08-20 | End: 2021-11-25 | Stop reason: SDUPTHER

## 2021-10-28 RX ORDER — BACLOFEN 10 MG/1
TABLET ORAL
Status: CANCELLED | OUTPATIENT
Start: 2021-10-28

## 2021-10-29 ENCOUNTER — HOSPITAL ENCOUNTER (OUTPATIENT)
Dept: CARDIOLOGY | Facility: HOSPITAL | Age: 61
Discharge: HOME | End: 2021-10-29
Attending: FAMILY MEDICINE
Payer: COMMERCIAL

## 2021-10-29 VITALS — WEIGHT: 276 LBS | HEIGHT: 67 IN | BODY MASS INDEX: 43.32 KG/M2

## 2021-10-29 DIAGNOSIS — R94.2 ABNORMAL PULMONARY FUNCTION: ICD-10-CM

## 2021-10-29 DIAGNOSIS — R06.02 SOB (SHORTNESS OF BREATH): ICD-10-CM

## 2021-10-29 LAB
AORTIC ROOT ANNULUS: 3.55 CM
AV INDEX (PROSTH): 0.78
AV MEAN GRADIENT: 5 MMHG
AV PEAK GRADIENT: 9 MMHG
AV VALVE AREA: 3.25 CM2
AV VELOCITY RATIO: 0.73
BSA FOR ECHO PROCEDURE: 2.43 M2
CV ECHO LV RWT: 0.53 CM
DOP CALC AO PEAK VEL: 1.46 M/S
DOP CALC AO VTI: 28.09 CM
DOP CALC LVOT AREA: 4.2 CM2
DOP CALC LVOT DIAMETER: 2.3 CM
DOP CALC LVOT PEAK VEL: 1.06 M/S
DOP CALC LVOT STROKE VOLUME: 91.28 CM3
DOP CALC MV VTI: 19.56 CM
DOP CALCLVOT PEAK VEL VTI: 21.98 CM
E WAVE DECELERATION TIME: 268.1 MSEC
E/A RATIO: 0.7
E/E' RATIO: 9.29 M/S
ECHO LV POSTERIOR WALL: 1.31 CM (ref 0.6–1.1)
EJECTION FRACTION: 65 %
FRACTIONAL SHORTENING: 29 % (ref 28–44)
INTERVENTRICULAR SEPTUM: 1.19 CM (ref 0.6–1.1)
IVC DIAMETER: 2.1 CM
LA MAJOR: 5.08 CM
LA MINOR: 4.47 CM
LA WIDTH: 3.61 CM
LEFT ATRIUM SIZE: 3.98 CM
LEFT ATRIUM VOLUME INDEX MOD: 17.6 ML/M2
LEFT ATRIUM VOLUME INDEX: 25 ML/M2
LEFT ATRIUM VOLUME MOD: 40.81 CM3
LEFT ATRIUM VOLUME: 58.08 CM3
LEFT INTERNAL DIMENSION IN SYSTOLE: 3.55 CM (ref 2.1–4)
LEFT VENTRICLE DIASTOLIC VOLUME INDEX: 50.4 ML/M2
LEFT VENTRICLE DIASTOLIC VOLUME: 116.92 ML
LEFT VENTRICLE MASS INDEX: 106 G/M2
LEFT VENTRICLE SYSTOLIC VOLUME INDEX: 22.7 ML/M2
LEFT VENTRICLE SYSTOLIC VOLUME: 52.59 ML
LEFT VENTRICULAR INTERNAL DIMENSION IN DIASTOLE: 4.98 CM (ref 3.5–6)
LEFT VENTRICULAR MASS: 246.04 G
LV LATERAL E/E' RATIO: 7.22 M/S
LV SEPTAL E/E' RATIO: 13 M/S
MV A" WAVE DURATION": 10.28 MSEC
MV MEAN GRADIENT: 1 MMHG
MV PEAK A VEL: 0.93 M/S
MV PEAK E VEL: 0.65 M/S
MV PEAK GRADIENT: 3 MMHG
MV STENOSIS PRESSURE HALF TIME: 77.75 MS
MV VALVE AREA BY CONTINUITY EQUATION: 4.67 CM2
MV VALVE AREA P 1/2 METHOD: 2.83 CM2
PULM VEIN S/D RATIO: 1.87
PV PEAK D VEL: 0.3 M/S
PV PEAK S VEL: 0.56 M/S
PV PEAK VELOCITY: 0.94 CM/S
RA MAJOR: 4.09 CM
RA PRESSURE: 8 MMHG
RA WIDTH: 2.74 CM
RIGHT VENTRICULAR END-DIASTOLIC DIMENSION: 3.27 CM
RV TISSUE DOPPLER FREE WALL SYSTOLIC VELOCITY 1 (APICAL 4 CHAMBER VIEW): 10.06 CM/S
TDI LATERAL: 0.09 M/S
TDI SEPTAL: 0.05 M/S
TDI: 0.07 M/S
TRICUSPID ANNULAR PLANE SYSTOLIC EXCURSION: 1.8 CM

## 2021-10-29 PROCEDURE — 93306 ECHO (CUPID ONLY): ICD-10-PCS | Mod: 26,,, | Performed by: INTERNAL MEDICINE

## 2021-10-29 PROCEDURE — 93306 TTE W/DOPPLER COMPLETE: CPT

## 2021-10-29 PROCEDURE — 93306 TTE W/DOPPLER COMPLETE: CPT | Mod: 26,,, | Performed by: INTERNAL MEDICINE

## 2021-10-29 RX ORDER — ROPINIROLE 0.5 MG/1
0.5 TABLET, FILM COATED ORAL NIGHTLY
Qty: 90 TABLET | Refills: 3 | Status: SHIPPED | OUTPATIENT
Start: 2021-10-29 | End: 2022-10-12 | Stop reason: SDUPTHER

## 2021-10-30 ENCOUNTER — PATIENT MESSAGE (OUTPATIENT)
Dept: FAMILY MEDICINE | Facility: CLINIC | Age: 61
End: 2021-10-30
Payer: COMMERCIAL

## 2021-11-24 ENCOUNTER — PATIENT MESSAGE (OUTPATIENT)
Dept: FAMILY MEDICINE | Facility: CLINIC | Age: 61
End: 2021-11-24
Payer: COMMERCIAL

## 2021-11-26 RX ORDER — BACLOFEN 10 MG/1
10 TABLET ORAL NIGHTLY PRN
Qty: 30 TABLET | Refills: 5 | Status: SHIPPED | OUTPATIENT
Start: 2021-11-26 | End: 2022-05-18 | Stop reason: SDUPTHER

## 2021-11-26 RX ORDER — GABAPENTIN 100 MG/1
100 CAPSULE ORAL NIGHTLY
Qty: 30 CAPSULE | Refills: 11 | Status: SHIPPED | OUTPATIENT
Start: 2021-11-26 | End: 2021-11-26 | Stop reason: SDUPTHER

## 2021-11-26 RX ORDER — GABAPENTIN 100 MG/1
100 CAPSULE ORAL 3 TIMES DAILY
Qty: 90 CAPSULE | Refills: 11 | Status: SHIPPED | OUTPATIENT
Start: 2021-11-26 | End: 2022-10-12 | Stop reason: SDUPTHER

## 2021-11-27 ENCOUNTER — PATIENT MESSAGE (OUTPATIENT)
Dept: FAMILY MEDICINE | Facility: CLINIC | Age: 61
End: 2021-11-27
Payer: COMMERCIAL

## 2021-11-29 ENCOUNTER — TELEPHONE (OUTPATIENT)
Dept: FAMILY MEDICINE | Facility: CLINIC | Age: 61
End: 2021-11-29
Payer: COMMERCIAL

## 2021-12-15 ENCOUNTER — PATIENT MESSAGE (OUTPATIENT)
Dept: FAMILY MEDICINE | Facility: CLINIC | Age: 61
End: 2021-12-15
Payer: COMMERCIAL

## 2021-12-27 ENCOUNTER — TELEPHONE (OUTPATIENT)
Dept: FAMILY MEDICINE | Facility: CLINIC | Age: 61
End: 2021-12-27
Payer: COMMERCIAL

## 2021-12-28 ENCOUNTER — PATIENT MESSAGE (OUTPATIENT)
Dept: FAMILY MEDICINE | Facility: CLINIC | Age: 61
End: 2021-12-28

## 2021-12-28 ENCOUNTER — OFFICE VISIT (OUTPATIENT)
Dept: FAMILY MEDICINE | Facility: CLINIC | Age: 61
End: 2021-12-28
Payer: COMMERCIAL

## 2021-12-28 DIAGNOSIS — I10 HYPERTENSION, UNSPECIFIED TYPE: ICD-10-CM

## 2021-12-28 DIAGNOSIS — E66.01 SEVERE OBESITY (BMI >= 40): ICD-10-CM

## 2021-12-28 DIAGNOSIS — J98.4 RESTRICTIVE LUNG DISEASE: ICD-10-CM

## 2021-12-28 DIAGNOSIS — J06.9 UPPER RESPIRATORY TRACT INFECTION, UNSPECIFIED TYPE: Primary | ICD-10-CM

## 2021-12-28 PROCEDURE — 4010F PR ACE/ARB THEARPY RXD/TAKEN: ICD-10-PCS | Mod: CPTII,95,, | Performed by: FAMILY MEDICINE

## 2021-12-28 PROCEDURE — 99214 PR OFFICE/OUTPT VISIT, EST, LEVL IV, 30-39 MIN: ICD-10-PCS | Mod: 95,,, | Performed by: FAMILY MEDICINE

## 2021-12-28 PROCEDURE — 3044F PR MOST RECENT HEMOGLOBIN A1C LEVEL <7.0%: ICD-10-PCS | Mod: CPTII,95,, | Performed by: FAMILY MEDICINE

## 2021-12-28 PROCEDURE — 99214 OFFICE O/P EST MOD 30 MIN: CPT | Mod: 95,,, | Performed by: FAMILY MEDICINE

## 2021-12-28 PROCEDURE — 4010F ACE/ARB THERAPY RXD/TAKEN: CPT | Mod: CPTII,95,, | Performed by: FAMILY MEDICINE

## 2021-12-28 PROCEDURE — 3044F HG A1C LEVEL LT 7.0%: CPT | Mod: CPTII,95,, | Performed by: FAMILY MEDICINE

## 2021-12-28 RX ORDER — ONDANSETRON 4 MG/1
4 TABLET, ORALLY DISINTEGRATING ORAL EVERY 6 HOURS PRN
Qty: 30 TABLET | Refills: 0 | Status: SHIPPED | OUTPATIENT
Start: 2021-12-28 | End: 2022-03-22

## 2022-01-12 DIAGNOSIS — Z12.11 COLON CANCER SCREENING: ICD-10-CM

## 2022-01-19 ENCOUNTER — PATIENT MESSAGE (OUTPATIENT)
Dept: FAMILY MEDICINE | Facility: CLINIC | Age: 62
End: 2022-01-19
Payer: COMMERCIAL

## 2022-01-19 RX ORDER — SERTRALINE HYDROCHLORIDE 100 MG/1
100 TABLET, FILM COATED ORAL DAILY
Qty: 90 TABLET | Refills: 3 | Status: SHIPPED | OUTPATIENT
Start: 2022-01-19 | End: 2022-11-01 | Stop reason: SDUPTHER

## 2022-01-19 NOTE — TELEPHONE ENCOUNTER
No new care gaps identified.  Powered by [employer]. Reference number: [id number].   1/19/2022 11:37:53 AM CST

## 2022-03-19 NOTE — TELEPHONE ENCOUNTER
No new care gaps identified.  Powered by [employer]. Reference number: [id number].   3/19/2022 9:07:30 AM CDT

## 2022-03-22 RX ORDER — ONDANSETRON 4 MG/1
TABLET, ORALLY DISINTEGRATING ORAL
Qty: 9 TABLET | Refills: 2 | Status: ON HOLD | OUTPATIENT
Start: 2022-03-22 | End: 2023-12-31 | Stop reason: HOSPADM

## 2022-04-18 ENCOUNTER — PATIENT MESSAGE (OUTPATIENT)
Dept: FAMILY MEDICINE | Facility: CLINIC | Age: 62
End: 2022-04-18
Payer: COMMERCIAL

## 2022-04-18 RX ORDER — LISINOPRIL AND HYDROCHLOROTHIAZIDE 12.5; 2 MG/1; MG/1
TABLET ORAL
Qty: 30 TABLET | Refills: 11 | Status: SHIPPED | OUTPATIENT
Start: 2022-04-18 | End: 2023-06-28 | Stop reason: SDUPTHER

## 2022-04-18 NOTE — TELEPHONE ENCOUNTER
No new care gaps identified.  Powered by [employer]. Reference number: [id number].   4/18/2022 11:36:24 AM CDT

## 2022-05-19 RX ORDER — BACLOFEN 10 MG/1
10 TABLET ORAL NIGHTLY PRN
Qty: 30 TABLET | Refills: 5 | Status: SHIPPED | OUTPATIENT
Start: 2022-05-19 | End: 2022-11-01 | Stop reason: SDUPTHER

## 2022-05-31 ENCOUNTER — PATIENT MESSAGE (OUTPATIENT)
Dept: ADMINISTRATIVE | Facility: HOSPITAL | Age: 62
End: 2022-05-31
Payer: COMMERCIAL

## 2022-06-02 ENCOUNTER — PATIENT MESSAGE (OUTPATIENT)
Dept: FAMILY MEDICINE | Facility: CLINIC | Age: 62
End: 2022-06-02
Payer: COMMERCIAL

## 2022-06-02 RX ORDER — FUROSEMIDE 40 MG/1
40 TABLET ORAL DAILY
Qty: 90 TABLET | Refills: 3 | Status: SHIPPED | OUTPATIENT
Start: 2022-06-02 | End: 2022-11-01 | Stop reason: SDUPTHER

## 2022-06-02 NOTE — TELEPHONE ENCOUNTER
Care Due:                  Date            Visit Type   Department     Provider  --------------------------------------------------------------------------------                                ESTABLISHED                              PATIENT -    [facility]  Last Visit: 12-      [facility]       Mike Mcdermott  Next Visit: None Scheduled  None         None Found                                                            Last  Test          Frequency    Reason                     Performed    Due Date  --------------------------------------------------------------------------------    CMP.........  12 months..  lisinopriL-hydrochlorothi  08- 08-                             azide....................    [employer] Embedded Care Gaps. Reference number: [id number]. 6/02/2022   11:34:25 AM CDT

## 2022-09-14 DIAGNOSIS — I10 HYPERTENSION: ICD-10-CM

## 2022-09-23 ENCOUNTER — HOSPITAL ENCOUNTER (EMERGENCY)
Facility: HOSPITAL | Age: 62
Discharge: HOME | End: 2022-09-23
Attending: EMERGENCY MEDICINE
Payer: COMMERCIAL

## 2022-09-23 VITALS
RESPIRATION RATE: 18 BRPM | DIASTOLIC BLOOD PRESSURE: 79 MMHG | SYSTOLIC BLOOD PRESSURE: 166 MMHG | WEIGHT: 293 LBS | HEART RATE: 68 BPM | TEMPERATURE: 98 F | OXYGEN SATURATION: 100 % | BODY MASS INDEX: 47.93 KG/M2

## 2022-09-23 DIAGNOSIS — T14.8XXA MUSCLE STRAIN: Primary | ICD-10-CM

## 2022-09-23 DIAGNOSIS — Z72.0 TOBACCO ABUSE: ICD-10-CM

## 2022-09-23 DIAGNOSIS — R07.9 CHEST PAIN: ICD-10-CM

## 2022-09-23 DIAGNOSIS — I10 HYPERTENSION, UNSPECIFIED TYPE: ICD-10-CM

## 2022-09-23 DIAGNOSIS — R11.0 NAUSEA: ICD-10-CM

## 2022-09-23 LAB
ALBUMIN SERPL BCP-MCNC: 3.6 G/DL (ref 3.5–5.2)
ALP SERPL-CCNC: 80 U/L (ref 38–126)
ALT SERPL W/O P-5'-P-CCNC: 16 U/L (ref 10–44)
ANION GAP SERPL CALC-SCNC: 7 MMOL/L (ref 8–16)
AST SERPL-CCNC: 22 U/L (ref 15–46)
BASOPHILS # BLD AUTO: 0.06 K/UL (ref 0–0.2)
BASOPHILS NFR BLD: 0.9 % (ref 0–1.9)
BILIRUB SERPL-MCNC: 0.4 MG/DL (ref 0.1–1)
CALCIUM SERPL-MCNC: 8.1 MG/DL (ref 8.7–10.5)
CHLORIDE SERPL-SCNC: 110 MMOL/L (ref 95–110)
CO2 SERPL-SCNC: 24 MMOL/L (ref 23–29)
CREAT SERPL-MCNC: 0.72 MG/DL (ref 0.5–1.4)
DIFFERENTIAL METHOD: ABNORMAL
EOSINOPHIL # BLD AUTO: 0.3 K/UL (ref 0–0.5)
EOSINOPHIL NFR BLD: 4.1 % (ref 0–8)
ERYTHROCYTE [DISTWIDTH] IN BLOOD BY AUTOMATED COUNT: 16.6 % (ref 11.5–14.5)
EST. GFR  (NO RACE VARIABLE): >60 ML/MIN/1.73 M^2
GLUCOSE SERPL-MCNC: 95 MG/DL (ref 70–110)
HCT VFR BLD AUTO: 34.1 % (ref 37–48.5)
HGB BLD-MCNC: 10.6 G/DL (ref 12–16)
IMM GRANULOCYTES # BLD AUTO: 0.02 K/UL (ref 0–0.04)
IMM GRANULOCYTES NFR BLD AUTO: 0.3 % (ref 0–0.5)
LYMPHOCYTES # BLD AUTO: 1.5 K/UL (ref 1–4.8)
LYMPHOCYTES NFR BLD: 22.8 % (ref 18–48)
MCH RBC QN AUTO: 26 PG (ref 27–31)
MCHC RBC AUTO-ENTMCNC: 31.1 G/DL (ref 32–36)
MCV RBC AUTO: 84 FL (ref 82–98)
MONOCYTES # BLD AUTO: 0.5 K/UL (ref 0.3–1)
MONOCYTES NFR BLD: 7.5 % (ref 4–15)
NEUTROPHILS # BLD AUTO: 4.2 K/UL (ref 1.8–7.7)
NEUTROPHILS NFR BLD: 64.4 % (ref 38–73)
NRBC BLD-RTO: 0 /100 WBC
PLATELET # BLD AUTO: 237 K/UL (ref 150–450)
PMV BLD AUTO: 10.9 FL (ref 9.2–12.9)
POTASSIUM SERPL-SCNC: 4.2 MMOL/L (ref 3.5–5.1)
PROT SERPL-MCNC: 6.9 G/DL (ref 6–8.4)
RBC # BLD AUTO: 4.08 M/UL (ref 4–5.4)
SODIUM SERPL-SCNC: 141 MMOL/L (ref 136–145)
TROPONIN ISTAT: <0.02 NG/ML (ref 0–0.08)
UUN UR-MCNC: 13 MG/DL (ref 7–17)
WBC # BLD AUTO: 6.54 K/UL (ref 3.9–12.7)

## 2022-09-23 PROCEDURE — 93010 EKG 12-LEAD: ICD-10-PCS | Mod: ,,, | Performed by: INTERNAL MEDICINE

## 2022-09-23 PROCEDURE — 99285 EMERGENCY DEPT VISIT HI MDM: CPT | Mod: 25,ER

## 2022-09-23 PROCEDURE — 84484 ASSAY OF TROPONIN QUANT: CPT | Mod: ER | Performed by: EMERGENCY MEDICINE

## 2022-09-23 PROCEDURE — 85025 COMPLETE CBC W/AUTO DIFF WBC: CPT | Mod: ER | Performed by: EMERGENCY MEDICINE

## 2022-09-23 PROCEDURE — 93010 ELECTROCARDIOGRAM REPORT: CPT | Mod: ,,, | Performed by: INTERNAL MEDICINE

## 2022-09-23 PROCEDURE — 93005 ELECTROCARDIOGRAM TRACING: CPT | Mod: ER

## 2022-09-23 RX ORDER — DICLOFENAC SODIUM 10 MG/G
2 GEL TOPICAL 4 TIMES DAILY
Qty: 100 G | Refills: 0 | Status: SHIPPED | OUTPATIENT
Start: 2022-09-23 | End: 2023-06-08 | Stop reason: HOSPADM

## 2022-09-23 RX ORDER — ACETAMINOPHEN 325 MG/1
650 TABLET ORAL EVERY 4 HOURS PRN
Qty: 30 TABLET | Refills: 0 | Status: ON HOLD | OUTPATIENT
Start: 2022-09-23 | End: 2023-12-31 | Stop reason: HOSPADM

## 2022-09-23 NOTE — DISCHARGE INSTRUCTIONS
Mrs. Clayton,    Thank you for letting me care for you today! It was nice meeting you, and I hope you feel better soon.   If you would like access to your chart and what was done today please utilize the [facility].   Please come back to [facility] for all of your future medical needs.    Our goal in the emergency department is to always give you outstanding care and exceptional service. You may receive a survey by mail or e-mail in the next week regarding your experience in our ED. We would greatly appreciate you completing and returning the survey. Your feedback provides us with a way to recognize our staff who give very good care and it helps us learn how to improve when your experience was below our aspiration of excellence.     Sincerely,    Shahzad Mark MD  Board Certified Emergency Physician

## 2022-09-23 NOTE — ED PROVIDER NOTES
Encounter Date: 2022       History     Chief Complaint   Patient presents with    Chest Pain     I have left sided chest pain, left shoulder and sometimes goes down my left arm. It does hurt when I move some this has been for 3 days. I am short of breath and having nausea with it. I am vomiting off and on for 3 days also.      63 yo woman with phm of hypertension and daily tobacco use who presents with pain in the left shoulder and chest worse with movement and laying on it made better by rest or positioning. Never had anything like this she can recall has not taken anything to help with it. Notes some nausea and vomiting which started yesterday as well. No fevers chills or obvious trauma.     Review of patient's allergies indicates:   Allergen Reactions    Penicillins Hives     Past Medical History:   Diagnosis Date    Anxiety     COPD (chronic obstructive pulmonary disease)     GERD (gastroesophageal reflux disease)     History of stomach ulcers     Hypertension     Restless leg syndrome      Past Surgical History:   Procedure Laterality Date    gastric by pass      HERNIA REPAIR      HYSTERECTOMY      OOPHORECTOMY Right      Family History   Problem Relation Age of Onset    COPD Father     Lung cancer Father     Colon cancer Maternal Grandmother         Colon cancer    COPD Mother             Multiple sclerosis Son     Heart disease Neg Hx     Stroke Neg Hx      Social History     Tobacco Use    Smoking status: Every Day     Packs/day: 1.00     Years: 49.00     Pack years: 49.00     Types: Cigarettes     Start date:     Smokeless tobacco: Never    Tobacco comments:     Enrolled in the [employer] on 21 ([facility] Member ID # [id number]). Ambulatory referral to Smoking Cessation program.   Substance Use Topics    Alcohol use: Never    Drug use: No     Review of Systems  Constitutional-no fever  HEENT-no congestion  Eyes-no redness  Respiratory-no shortness of breath  Cardio-+chest pain   GI-no  abdominal pain + nausea  Endocrine-no cold intolerance  -no difficulty urinating  MSK-no myalgias  Skin-no rashes  Allergy-no environmental allergy  Neurologic-, no headache  Hematology-no swollen nodes  Behavioral-no confusion   Physical Exam     Initial Vitals [09/23/22 1117]   BP Pulse Resp Temp SpO2   136/71 79 19 98.5 °F (36.9 °C) 96 %      MAP       --         Physical Exam  Constitutional: uncomfortable appearing 61 yo woman in mild distress  Eyes: Conjunctivae normal.  ENT       Head: Normocephalic, atraumatic.       Nose: Normal external appearance        Mouth/Throat: no strigulous respirations   Hematological/Lymphatic/Immunilogical: no visible lymphadenopathy   Cardiovascular: Normal rate,   Respiratory: Normal respiratory effort.   Gastrointestinal: non distended   Musculoskeletal: Normal range of motion in all extremities. TTP in the left chest wall  Neurologic: Alert, oriented. Normal speech and language. No gross focal neurologic deficits are appreciated.  Skin: Skin is warm, dry. No rash noted.  Psychiatric: Mood and affect are normal.    ED Course   Procedures  Labs Reviewed   CBC W/ AUTO DIFFERENTIAL - Abnormal; Notable for the following components:       Result Value    Hemoglobin 10.6 (*)     Hematocrit 34.1 (*)     MCH 26.0 (*)     MCHC 31.1 (*)     RDW 16.6 (*)     All other components within normal limits   COMPREHENSIVE METABOLIC PANEL - Abnormal; Notable for the following components:    Calcium 8.1 (*)     Anion Gap 7 (*)     All other components within normal limits   TROPONIN I     EKG Readings: (Independently Interpreted)   My EKG interpretation, sinus rhythm, 80 beats per minute, normal axis, no ST segment changes, when compared to previous EKG 06/30/2021 relatively unchanged EKG   ECG Results              EKG 12-lead (Final result)  Result time 09/25/22 17:56:13      Final result by Interface, Lab In [facility] (09/25/22 17:56:13)                   Narrative:    Test Reason :  R07.9,    Vent. Rate : 080 BPM     Atrial Rate : 080 BPM     P-R Int : 160 ms          QRS Dur : 090 ms      QT Int : 376 ms       P-R-T Axes : -22 034 044 degrees     QTc Int : 433 ms    Sinus rhythm with Premature atrial complexes  Otherwise normal ECG  When compared with ECG of 30-JUN-2021 15:28,  Premature atrial complexes are now Present  Confirmed by Camilla Mcclure MD ([id number]) on 9/25/2022 5:56:06 PM    Referred By: SALBADOR   SELF           Confirmed By:Camilla Mcclure MD                                  Imaging Results              X-Ray Chest AP Portable (Final result)  Result time 09/23/22 12:15:08      Final result by TOM Briscoe Sr., MD (09/23/22 12:15:08)                   Impression:      There is a streaky opacity in the lateral aspect of the base of the left lung.  This is characteristic of subsegmental atelectasis or scarring..      Electronically signed by: Jesus Briscoe MD  Date:    09/23/2022  Time:    12:15               Narrative:    EXAMINATION:  XR CHEST AP PORTABLE    CLINICAL HISTORY:  Chest pain, unspecified    COMPARISON:  07/03/2021    FINDINGS:  The size of the heart is normal.  There is a streaky opacity in the lateral aspect of the base of the left lung.  The right lung is clear.  There is no pneumothorax.  The costophrenic angles are sharp.                                       Medications - No data to display  Medical Decision Making:   History:   I obtained history from: someone other than patient.       <> Summary of History: Daughter notes sedentary lifestyle and occasional picking up of dog  Old Medical Records: I decided to obtain old medical records.  Old Records Summarized: records from clinic visits and records from previous admission(s).  Differential Diagnosis:   MI, pleurisy, pneumonia, strain  Clinical Tests:   Lab Tests: Ordered and Reviewed  Radiological Study: Ordered and Reviewed  Medical Tests: Ordered and Reviewed  Additional MDM:   Heart Score:    History:           Slightly suspicious.  ECG:             Normal  Age:               45-65 years  Risk factors: >= 3 risk factors or history of atherosclerotic disease  Troponin:       Less than or equal to normal limit  Final Score: 3                       Clinical Impression:   Final diagnoses:  [R07.9] Chest pain  [T14.8XXA] Muscle strain (Primary)  [I10] Hypertension, unspecified type  [Z72.0] Tobacco abuse  [R11.0] Nausea        ED Disposition Condition    Discharge Stable          ED Prescriptions       Medication Sig Dispense Start Date End Date Auth. Provider    acetaminophen (TYLENOL) 325 MG tablet Take 2 tablets (650 mg total) by mouth every 4 (four) hours as needed for Pain. 30 tablet 9/23/2022 -- Shahzad Mark MD    diclofenac sodium (VOLTAREN) 1 % Gel Apply 2 g topically 4 (four) times daily. 100 g 9/23/2022 -- Shahzad Mark MD          Follow-up Information       Follow up With Specialties Details Why Contact Info    Mike Mcdermott MD Family Medicine Call in 3 days If symptoms worsen, For a follow up visit about today [address]  [address]  [facility] [address]  [phone]               Shahzad Mark MD  09/24/22 1204       Shahzad Mark MD  10/19/22 2480

## 2022-09-28 DIAGNOSIS — Z12.31 OTHER SCREENING MAMMOGRAM: ICD-10-CM

## 2022-10-03 ENCOUNTER — PATIENT MESSAGE (OUTPATIENT)
Dept: ADMINISTRATIVE | Facility: HOSPITAL | Age: 62
End: 2022-10-03
Payer: COMMERCIAL

## 2022-10-10 ENCOUNTER — PATIENT MESSAGE (OUTPATIENT)
Dept: ADMINISTRATIVE | Facility: HOSPITAL | Age: 62
End: 2022-10-10
Payer: COMMERCIAL

## 2022-11-03 ENCOUNTER — PATIENT MESSAGE (OUTPATIENT)
Dept: ADMINISTRATIVE | Facility: HOSPITAL | Age: 62
End: 2022-11-03
Payer: COMMERCIAL

## 2023-01-17 ENCOUNTER — PATIENT MESSAGE (OUTPATIENT)
Dept: ADMINISTRATIVE | Facility: HOSPITAL | Age: 63
End: 2023-01-17
Payer: COMMERCIAL

## 2023-06-08 ENCOUNTER — HOSPITAL ENCOUNTER (EMERGENCY)
Facility: HOSPITAL | Age: 63
Discharge: HOME | End: 2023-06-08
Attending: STUDENT IN AN ORGANIZED HEALTH CARE EDUCATION/TRAINING PROGRAM
Payer: COMMERCIAL

## 2023-06-08 VITALS
WEIGHT: 293 LBS | TEMPERATURE: 99 F | RESPIRATION RATE: 20 BRPM | SYSTOLIC BLOOD PRESSURE: 155 MMHG | HEIGHT: 67 IN | OXYGEN SATURATION: 94 % | DIASTOLIC BLOOD PRESSURE: 98 MMHG | BODY MASS INDEX: 45.99 KG/M2 | HEART RATE: 74 BPM

## 2023-06-08 DIAGNOSIS — M79.89 LEFT ARM SWELLING: Primary | ICD-10-CM

## 2023-06-08 DIAGNOSIS — L03.114 CELLULITIS OF LEFT UPPER EXTREMITY: ICD-10-CM

## 2023-06-08 LAB
ALBUMIN SERPL BCP-MCNC: 3.9 G/DL (ref 3.5–5.2)
ALP SERPL-CCNC: 104 U/L (ref 38–126)
ALT SERPL W/O P-5'-P-CCNC: 14 U/L (ref 10–44)
ANION GAP SERPL CALC-SCNC: 9 MMOL/L (ref 8–16)
AST SERPL-CCNC: 18 U/L (ref 15–46)
BASOPHILS # BLD AUTO: 0.06 K/UL (ref 0–0.2)
BASOPHILS NFR BLD: 0.8 % (ref 0–1.9)
BILIRUB SERPL-MCNC: 0.3 MG/DL (ref 0.1–1)
CALCIUM SERPL-MCNC: 8.8 MG/DL (ref 8.7–10.5)
CHLORIDE SERPL-SCNC: 103 MMOL/L (ref 95–110)
CO2 SERPL-SCNC: 28 MMOL/L (ref 23–29)
CREAT SERPL-MCNC: 0.63 MG/DL (ref 0.5–1.4)
DIFFERENTIAL METHOD: ABNORMAL
EOSINOPHIL # BLD AUTO: 0.3 K/UL (ref 0–0.5)
EOSINOPHIL NFR BLD: 3.9 % (ref 0–8)
ERYTHROCYTE [DISTWIDTH] IN BLOOD BY AUTOMATED COUNT: 18 % (ref 11.5–14.5)
EST. GFR  (NO RACE VARIABLE): >60 ML/MIN/1.73 M^2
GLUCOSE SERPL-MCNC: 100 MG/DL (ref 70–110)
HCT VFR BLD AUTO: 34 % (ref 37–48.5)
HGB BLD-MCNC: 10.2 G/DL (ref 12–16)
IMM GRANULOCYTES # BLD AUTO: 0.02 K/UL (ref 0–0.04)
IMM GRANULOCYTES NFR BLD AUTO: 0.3 % (ref 0–0.5)
LYMPHOCYTES # BLD AUTO: 1.8 K/UL (ref 1–4.8)
LYMPHOCYTES NFR BLD: 25.2 % (ref 18–48)
MCH RBC QN AUTO: 23.7 PG (ref 27–31)
MCHC RBC AUTO-ENTMCNC: 30 G/DL (ref 32–36)
MCV RBC AUTO: 79 FL (ref 82–98)
MONOCYTES # BLD AUTO: 0.5 K/UL (ref 0.3–1)
MONOCYTES NFR BLD: 6.9 % (ref 4–15)
NEUTROPHILS # BLD AUTO: 4.6 K/UL (ref 1.8–7.7)
NEUTROPHILS NFR BLD: 62.9 % (ref 38–73)
NRBC BLD-RTO: 0 /100 WBC
PLATELET # BLD AUTO: 299 K/UL (ref 150–450)
PMV BLD AUTO: 10.1 FL (ref 9.2–12.9)
POTASSIUM SERPL-SCNC: 4 MMOL/L (ref 3.5–5.1)
PROT SERPL-MCNC: 7.3 G/DL (ref 6–8.4)
RBC # BLD AUTO: 4.31 M/UL (ref 4–5.4)
SODIUM SERPL-SCNC: 140 MMOL/L (ref 136–145)
UUN UR-MCNC: 12 MG/DL (ref 7–17)
WBC # BLD AUTO: 7.23 K/UL (ref 3.9–12.7)

## 2023-06-08 PROCEDURE — 80053 COMPREHEN METABOLIC PANEL: CPT | Mod: ER | Performed by: PHYSICIAN ASSISTANT

## 2023-06-08 PROCEDURE — 99284 EMERGENCY DEPT VISIT MOD MDM: CPT | Mod: 25,ER

## 2023-06-08 PROCEDURE — 63600175 PHARM REV CODE 636 W HCPCS: Mod: ER | Performed by: STUDENT IN AN ORGANIZED HEALTH CARE EDUCATION/TRAINING PROGRAM

## 2023-06-08 PROCEDURE — 96374 THER/PROPH/DIAG INJ IV PUSH: CPT | Mod: ER

## 2023-06-08 PROCEDURE — 85025 COMPLETE CBC W/AUTO DIFF WBC: CPT | Mod: ER | Performed by: PHYSICIAN ASSISTANT

## 2023-06-08 RX ORDER — NABUMETONE 500 MG/1
500 TABLET, FILM COATED ORAL 2 TIMES DAILY
Qty: 14 TABLET | Refills: 0 | Status: SHIPPED | OUTPATIENT
Start: 2023-06-08 | End: 2023-06-15

## 2023-06-08 RX ORDER — SULFAMETHOXAZOLE AND TRIMETHOPRIM 800; 160 MG/1; MG/1
1 TABLET ORAL 2 TIMES DAILY
Qty: 14 TABLET | Refills: 0 | Status: SHIPPED | OUTPATIENT
Start: 2023-06-08 | End: 2023-06-15

## 2023-06-08 RX ORDER — KETOROLAC TROMETHAMINE 30 MG/ML
30 INJECTION, SOLUTION INTRAMUSCULAR; INTRAVENOUS
Status: COMPLETED | OUTPATIENT
Start: 2023-06-08 | End: 2023-06-08

## 2023-06-08 RX ORDER — KETOROLAC TROMETHAMINE 30 MG/ML
10 INJECTION, SOLUTION INTRAMUSCULAR; INTRAVENOUS
Status: DISCONTINUED | OUTPATIENT
Start: 2023-06-08 | End: 2023-06-08

## 2023-06-08 RX ADMIN — KETOROLAC TROMETHAMINE 30 MG: 30 INJECTION INTRAMUSCULAR; INTRAVENOUS at 06:06

## 2023-06-08 NOTE — ED PROVIDER NOTES
Encounter Date: 2023       History     Chief Complaint   Patient presents with    Arm Swelling     Swelling to left lower arm for about 10 days, last night left hand began swelling, does not recall incident to cause      63 year old patient presents with pain and swelling in left arm.  She states the swelling started about 10 days ago.  She has not had any injury.  She states she has never had swelling like this before.      The history is provided by the patient.   Review of patient's allergies indicates:   Allergen Reactions    Penicillins Hives     Past Medical History:   Diagnosis Date    Anxiety     COPD (chronic obstructive pulmonary disease)     GERD (gastroesophageal reflux disease)     History of stomach ulcers     Hypertension     Restless leg syndrome      Past Surgical History:   Procedure Laterality Date    gastric by pass      HERNIA REPAIR      HYSTERECTOMY      OOPHORECTOMY Right      Family History   Problem Relation Age of Onset    COPD Father     Lung cancer Father     Colon cancer Maternal Grandmother         Colon cancer    COPD Mother             Multiple sclerosis Son     Heart disease Neg Hx     Stroke Neg Hx      Social History     Tobacco Use    Smoking status: Every Day     Packs/day: 1.00     Years: 49.00     Pack years: 49.00     Types: Cigarettes     Start date:     Smokeless tobacco: Never    Tobacco comments:     Enrolled in the [employer] on 21 ([facility] Member ID # [id number]). Ambulatory referral to Smoking Cessation program.   Substance Use Topics    Alcohol use: Never    Drug use: No     Review of Systems   Constitutional: Negative.    HENT: Negative.     Eyes: Negative.    Respiratory: Negative.     Cardiovascular: Negative.    Gastrointestinal: Negative.    Endocrine: Negative.    Genitourinary: Negative.    Musculoskeletal: Negative.         Left forearm and hand swelling.     Skin:  Positive for color change.   Allergic/Immunologic: Negative.     Neurological: Negative.    Hematological: Negative.    Psychiatric/Behavioral: Negative.       Physical Exam     Initial Vitals [06/08/23 1536]   BP Pulse Resp Temp SpO2   (!) 157/92 84 20 98.7 °F (37.1 °C) 96 %      MAP       --         Physical Exam    Constitutional: She appears well-developed and well-nourished. She is Obese .   Neck: Trachea normal.    Full passive range of motion without pain.     Cardiovascular:  Normal rate and regular rhythm.           Pulmonary/Chest: Effort normal and breath sounds normal.   Musculoskeletal:      Left forearm: Swelling, edema and tenderness present. No deformity.      Cervical back: Full passive range of motion without pain.     Skin: There is erythema.            ED Course   Procedures  Labs Reviewed   CBC W/ AUTO DIFFERENTIAL - Abnormal; Notable for the following components:       Result Value    Hemoglobin 10.2 (*)     Hematocrit 34.0 (*)     MCV 79 (*)     MCH 23.7 (*)     MCHC 30.0 (*)     RDW 18.0 (*)     All other components within normal limits   COMPREHENSIVE METABOLIC PANEL          Imaging Results    None          Medications   ketorolac injection 9.999 mg (has no administration in time range)     Medical Decision Making:   Initial Assessment:   63 year old patient presents with pain and swelling in left arm.  She states the swelling started about 10 days ago.  She has not had any injury.  She states she has never had swelling like this before.    Differential Diagnosis:   Cellulitis left arm, fluid retention  ED Management:  Ordered cbc and cmp.  Hemoglobin was 10.2 which is comparable to hemoglobin from September.                          Clinical Impression:   Final diagnoses:  [M79.89] Left arm swelling (Primary)  [L03.114] Cellulitis of left upper extremity               Fitz Padilla PA-C  06/08/23 5256

## 2023-06-08 NOTE — ED NOTES
LOC:The patient is awake, alert and cooperative with a calm affect, patient is aware of environment and behaving in an age appropriate manor, patient recognizes caregiver and is speaking appropriately for age.    APPEARANCE: Resting comfortably, in no acute distress, the patient has clean hair, skin and nails, patient's clothing is properly fastened.    RESPIRATORY: Airway is open and patent, respirations are spontaneous, normal respiratory effort and rate noted.     MUSCULOSKELETAL: Patient moving all extremities well, no obvious deformities noted.    SKIN: The skin is warm and dry, patient has normal skin turgor and moist mucus membranes, no breakdown or brusing noted.    ABDOMEN: Soft and non tender in all four quadrants.     Pt presents to ER with c/o pain and swelling to left forearm. Ongoing for a week. States swelling has spread and become more painful. No open wounds. Denies any injury. Swelling is from elbow down to her hand.

## 2023-06-21 ENCOUNTER — PATIENT MESSAGE (OUTPATIENT)
Dept: ADMINISTRATIVE | Facility: HOSPITAL | Age: 63
End: 2023-06-21
Payer: COMMERCIAL

## 2023-06-22 ENCOUNTER — TELEPHONE (OUTPATIENT)
Dept: FAMILY MEDICINE | Facility: CLINIC | Age: 63
End: 2023-06-22
Payer: COMMERCIAL

## 2023-06-22 ENCOUNTER — PATIENT MESSAGE (OUTPATIENT)
Dept: ADMINISTRATIVE | Facility: HOSPITAL | Age: 63
End: 2023-06-22
Payer: COMMERCIAL

## 2023-06-28 ENCOUNTER — OFFICE VISIT (OUTPATIENT)
Dept: FAMILY MEDICINE | Facility: CLINIC | Age: 63
End: 2023-06-28
Payer: COMMERCIAL

## 2023-06-28 VITALS
HEART RATE: 86 BPM | HEIGHT: 67 IN | WEIGHT: 293 LBS | DIASTOLIC BLOOD PRESSURE: 96 MMHG | SYSTOLIC BLOOD PRESSURE: 146 MMHG | BODY MASS INDEX: 45.99 KG/M2 | OXYGEN SATURATION: 93 %

## 2023-06-28 DIAGNOSIS — Z12.2 SCREENING FOR LUNG CANCER: ICD-10-CM

## 2023-06-28 DIAGNOSIS — F32.9 MAJOR DEPRESSIVE DISORDER, REMISSION STATUS UNSPECIFIED, UNSPECIFIED WHETHER RECURRENT: ICD-10-CM

## 2023-06-28 DIAGNOSIS — F17.200 TOBACCO DEPENDENCE: ICD-10-CM

## 2023-06-28 DIAGNOSIS — I10 HYPERTENSION, UNSPECIFIED TYPE: Primary | ICD-10-CM

## 2023-06-28 DIAGNOSIS — J44.9 CHRONIC OBSTRUCTIVE PULMONARY DISEASE, UNSPECIFIED COPD TYPE: ICD-10-CM

## 2023-06-28 DIAGNOSIS — D64.9 ANEMIA, UNSPECIFIED TYPE: ICD-10-CM

## 2023-06-28 DIAGNOSIS — F41.9 ANXIETY: ICD-10-CM

## 2023-06-28 DIAGNOSIS — J98.4 RESTRICTIVE LUNG DISEASE: ICD-10-CM

## 2023-06-28 PROCEDURE — 99999 PR PBB SHADOW E&M-EST. PATIENT-LVL V: ICD-10-PCS | Mod: PBBFAC,,, | Performed by: FAMILY MEDICINE

## 2023-06-28 PROCEDURE — 3080F DIAST BP >= 90 MM HG: CPT | Mod: CPTII,S$GLB,, | Performed by: FAMILY MEDICINE

## 2023-06-28 PROCEDURE — 4010F ACE/ARB THERAPY RXD/TAKEN: CPT | Mod: CPTII,S$GLB,, | Performed by: FAMILY MEDICINE

## 2023-06-28 PROCEDURE — 4010F PR ACE/ARB THEARPY RXD/TAKEN: ICD-10-PCS | Mod: CPTII,S$GLB,, | Performed by: FAMILY MEDICINE

## 2023-06-28 PROCEDURE — 3008F BODY MASS INDEX DOCD: CPT | Mod: CPTII,S$GLB,, | Performed by: FAMILY MEDICINE

## 2023-06-28 PROCEDURE — 99215 PR OFFICE/OUTPT VISIT, EST, LEVL V, 40-54 MIN: ICD-10-PCS | Mod: S$GLB,,, | Performed by: FAMILY MEDICINE

## 2023-06-28 PROCEDURE — 99215 OFFICE O/P EST HI 40 MIN: CPT | Mod: S$GLB,,, | Performed by: FAMILY MEDICINE

## 2023-06-28 PROCEDURE — 1159F MED LIST DOCD IN RCRD: CPT | Mod: CPTII,S$GLB,, | Performed by: FAMILY MEDICINE

## 2023-06-28 PROCEDURE — 3008F PR BODY MASS INDEX (BMI) DOCUMENTED: ICD-10-PCS | Mod: CPTII,S$GLB,, | Performed by: FAMILY MEDICINE

## 2023-06-28 PROCEDURE — 3077F PR MOST RECENT SYSTOLIC BLOOD PRESSURE >= 140 MM HG: ICD-10-PCS | Mod: CPTII,S$GLB,, | Performed by: FAMILY MEDICINE

## 2023-06-28 PROCEDURE — 1159F PR MEDICATION LIST DOCUMENTED IN MEDICAL RECORD: ICD-10-PCS | Mod: CPTII,S$GLB,, | Performed by: FAMILY MEDICINE

## 2023-06-28 PROCEDURE — 99999 PR PBB SHADOW E&M-EST. PATIENT-LVL V: CPT | Mod: PBBFAC,,, | Performed by: FAMILY MEDICINE

## 2023-06-28 PROCEDURE — 3077F SYST BP >= 140 MM HG: CPT | Mod: CPTII,S$GLB,, | Performed by: FAMILY MEDICINE

## 2023-06-28 PROCEDURE — 3080F PR MOST RECENT DIASTOLIC BLOOD PRESSURE >= 90 MM HG: ICD-10-PCS | Mod: CPTII,S$GLB,, | Performed by: FAMILY MEDICINE

## 2023-06-28 RX ORDER — MESALAMINE 1.2 G/1
2.4 TABLET, DELAYED RELEASE ORAL
COMMUNITY
Start: 2023-06-07

## 2023-06-28 RX ORDER — BENZONATATE 100 MG/1
100 CAPSULE ORAL 3 TIMES DAILY
COMMUNITY
Start: 2023-01-16

## 2023-06-28 RX ORDER — LISINOPRIL AND HYDROCHLOROTHIAZIDE 12.5; 2 MG/1; MG/1
2 TABLET ORAL DAILY
Qty: 30 TABLET | Refills: 11 | Status: SHIPPED | OUTPATIENT
Start: 2023-06-28 | End: 2023-10-10 | Stop reason: SDUPTHER

## 2023-06-28 RX ORDER — SERTRALINE HYDROCHLORIDE 100 MG/1
200 TABLET, FILM COATED ORAL DAILY
Qty: 180 TABLET | Refills: 3 | Status: SHIPPED | OUTPATIENT
Start: 2023-06-28 | End: 2023-10-10 | Stop reason: SDUPTHER

## 2023-06-28 NOTE — PATIENT INSTRUCTIONS
Medications Ordered This Encounter   Medications    fluticasone-umeclidin-vilanter (TRELEGY ELLIPTA) 100-62.5-25 mcg DsDv     Sig: Inhale 1 puff into the lungs once daily.     Dispense:  1 each     Refill:  11    lisinopriL-hydrochlorothiazide (PRINZIDE,ZESTORETIC) 20-12.5 mg per tablet     Sig: Take 2 tablets by mouth once daily.     Dispense:  30 tablet     Refill:  11     .    sertraline (ZOLOFT) 100 MG tablet     Sig: Take 2 tablets (200 mg total) by mouth once daily.     Dispense:  180 tablet     Refill:  3       Orders Placed This Encounter   Procedures    CT Chest Lung Screening Low Dose    Comprehensive Metabolic Panel    Lipid Panel    TSH    Hemoglobin A1C    CBC Auto Differential    Iron and TIBC    Ferritin    Folate    Vitamin B12    Reticulocytes    Ambulatory referral/consult to Smoking Cessation Program       Lifestyle choices to lower blood pressure    Diet  DASH diet--high in fruits/vegetables and low in fat and saturated fat: 8-14 points  Salt restriction--2.3 grams sodium/day: 2-8 points    Weight loss--5-20 points per 20 lbs lost.    Exercise--30min most days/wk: 4-9 points    Limit Alcohol--2/day men; 1/day women: 2-4 points.        The DASH Diet: Healthy Eating to Control Your Blood Pressure     What is the DASH diet?    DASH stands for Dietary Approaches to Stop Hypertension. It is a balanced eating plan that your family doctor might recommend to help you lower your blood pressure. The DASH diet:  Is low in salt, saturated fat, cholesterol and total fat.   Emphasizes fruits, vegetables, and fat-free or low-fat milk and milk products.   Includes whole grains, fish, poultry and nuts.   Limits the amount of red meat, sweets, added sugars and sugar-containing beverages in your diet.   Is rich in potassium, magnesium and calcium, as well as protein and fiber.     How can the DASH diet help me stay healthy?  Getting too much sodium (salt) in your diet can contribute to high blood pressure (also  "called hypertension). Some salt is in foods naturally, and some salt is added to food when it is processed or prepared. Following the DASH diet can help you lower your blood pressure, or prevent high blood pressure, by reducing the amount of sodium in your diet to less than 2,300 mg per day.  The fruits, vegetables and whole grains recommended in the DASH diet provide many other elements of a healthy diet, such as lycopene, beta-carotene and isoflavones. These can help protect your body against common health conditions, such as cancer, osteoporosis, stroke and diabetes. Following the DASH diet can also help reduce your risk of heart disease by lowering your low-density lipoprotein (LDL, or "bad") cholesterol level.  Following the DASH diet may drop your blood pressure by a few points in as little as 2 weeks. However, you should not stop taking your blood pressure medicine, or any other prescribed medicine, without talking to your doctor first.    What kinds of foods are included in the DASH diet?  The DASH diet is nutritionally balanced for good health. You dont need to buy any special foods or pills, or cook with any special recipes, to follow the DASH diet. If you follow the DASH diet, you will eat about 2,000 calories each day. These calories will come from a variety of foods.    Where is sodium in my diet?  Food Serving Sodium Content   ¼ teaspoon table salt 575 mg   ½ teaspoon table salt 1,150 mg   1 teaspoon table salt 2,300 mg   1 hot dog 460 mg   1 regular fast-food hamburger 600 mg   2 ounces processed cheese 600 mg   1 tablespoon soy sauce 900 mg   1 serving frozen pizza with meat and vegetables 982 mg   8 ounces regular potato chips 1,192 mg     The DASH diet  Whole grains (6 to 8 servings a day)   Vegetables (4 to 5 servings a day)   Fruits (4 to 5 servings a day)   Low-fat or fat-free milk and milk products (2 to 3 servings a day)   Lean meats, poultry and fish (6 or fewer servings a day)   Nuts, seeds " and beans (4 to 5 servings a week)   Fats and oils (2 to 3 servings a day)   Sweets, preferably low-fat or fat-free (5 or fewer a week)   Sodium (no more than 2,300 mg a day)   You can adapt the DASH diet to meet your needs. For example:  If you drink alcohol, limit yourself to 2 drinks or less per day for men and 1 drink or less per day for women.   To reduce your blood pressure even more, replace some of the carbohydrates in the DASH diet with low-fat protein and unsaturated fats.   If you need to lose weight, reduce the number of calories you eat to about 1,600 per day.   Follow a lower-sodium version (no more than 1,500 mg daily) if you are 40 years of age or older, you are  or you already have high blood pressure.     How can I change my eating habits?  Dont be discouraged if following the DASH diet is difficult at first. Start with small, achievable goals. The following ideas can help you make healthy changes:  Pay attention to your current eating habits. Make a list of everything you eat for 2 or 3 days. Compare this list to the DASH diet recommendations above and see what changes you need to make in your diet.   Make one change at a time. For example, start by choosing lower-fat versions of your favorite foods or adding more whole grains to your meals.   Learn what makes a serving for each type of food. For example, 1 serving equals 1 slice of bread, 8 ounces of milk, 1 cup of raw vegetables or 1/2 cup of cooked vegetables. For more serving sizes, go to the [employer] site. Dont have a measuring cup? One serving (3 ounces) of meat or poultry is about the size of a deck of cards. One serving (1/2 cup) of rice or potato looks like half a baseball, and a serving of cheese is about the size of 4 stacked dice.   If eating more fruits and vegetables gives you gas, bloating or diarrhea, increase these foods slowly. You can also talk to your family doctor about taking over-the-counter medicines to  "reduce these symptoms until your body adjusts.   Get more exercise. Physical activity helps lower your blood pressure and can help you lose more weight.   Use salt-free seasonings, such as spices and herbs, to add flavor to your recipes and reduce or eliminate salt.   Include as many fresh and unprocessed foods as possible. Cut back on frozen dinners, packaged mixes, canned soups and bottled salad dressings, which are often high in sodium.   When buying canned, frozen or processed foods, check nutrition labels for the amount of sodium, sugar and saturated fat. Look for the phrases "no salt added," "sodium-free," "low sodium" or "very low sodium" on food packages. Choose foods with monounsaturated and polyunsaturated fats.   Steam, grill, poach, roast or stir-sargent foods. Use low-sodium broth or water instead of butter or oil for sautéing.   When you eat at a restaurant, ask how foods are prepared. Ask if your order can be made without added salt. Dont add salty condiments, such as ketchup, mustard, pickles or sauces, to your food.   Other Organizations  Centers for Disease Control and Prevention   National Heart, Lung, and Blood [place]   Written by familydoctor.org editorial staff  Reviewed/Updated: 02/11  Created: 08/09     "
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
## 2023-06-30 ENCOUNTER — LAB VISIT (OUTPATIENT)
Dept: LAB | Facility: HOSPITAL | Age: 63
End: 2023-06-30
Attending: FAMILY MEDICINE
Payer: COMMERCIAL

## 2023-06-30 DIAGNOSIS — J44.9 CHRONIC OBSTRUCTIVE PULMONARY DISEASE, UNSPECIFIED COPD TYPE: ICD-10-CM

## 2023-06-30 DIAGNOSIS — D64.9 ANEMIA, UNSPECIFIED TYPE: ICD-10-CM

## 2023-06-30 DIAGNOSIS — I10 HYPERTENSION, UNSPECIFIED TYPE: ICD-10-CM

## 2023-06-30 DIAGNOSIS — J98.4 RESTRICTIVE LUNG DISEASE: ICD-10-CM

## 2023-06-30 LAB
ALBUMIN SERPL BCP-MCNC: 3.8 G/DL (ref 3.5–5.2)
ALP SERPL-CCNC: 102 U/L (ref 38–126)
ALT SERPL W/O P-5'-P-CCNC: 17 U/L (ref 10–44)
ANION GAP SERPL CALC-SCNC: 9 MMOL/L (ref 8–16)
AST SERPL-CCNC: 20 U/L (ref 15–46)
BASOPHILS # BLD AUTO: 0.04 K/UL (ref 0–0.2)
BASOPHILS NFR BLD: 0.7 % (ref 0–1.9)
BILIRUB SERPL-MCNC: 0.2 MG/DL (ref 0.1–1)
CALCIUM SERPL-MCNC: 8.7 MG/DL (ref 8.7–10.5)
CHLORIDE SERPL-SCNC: 105 MMOL/L (ref 95–110)
CHOLEST SERPL-MCNC: 148 MG/DL (ref 120–199)
CHOLEST/HDLC SERPL: 2.6 {RATIO} (ref 2–5)
CO2 SERPL-SCNC: 27 MMOL/L (ref 23–29)
CREAT SERPL-MCNC: 0.68 MG/DL (ref 0.5–1.4)
DIFFERENTIAL METHOD: ABNORMAL
EOSINOPHIL # BLD AUTO: 0.3 K/UL (ref 0–0.5)
EOSINOPHIL NFR BLD: 4.2 % (ref 0–8)
ERYTHROCYTE [DISTWIDTH] IN BLOOD BY AUTOMATED COUNT: 18 % (ref 11.5–14.5)
EST. GFR  (NO RACE VARIABLE): >60 ML/MIN/1.73 M^2
ESTIMATED AVG GLUCOSE: 123 MG/DL (ref 68–131)
FERRITIN SERPL-MCNC: 11 NG/ML (ref 20–300)
FOLATE SERPL-MCNC: 3.2 NG/ML (ref 4–24)
GLUCOSE SERPL-MCNC: 100 MG/DL (ref 70–110)
HBA1C MFR BLD: 5.9 % (ref 4–5.6)
HCT VFR BLD AUTO: 34.5 % (ref 37–48.5)
HDLC SERPL-MCNC: 56 MG/DL (ref 40–75)
HDLC SERPL: 37.8 % (ref 20–50)
HGB BLD-MCNC: 10.3 G/DL (ref 12–16)
IMM GRANULOCYTES # BLD AUTO: 0.01 K/UL (ref 0–0.04)
IMM GRANULOCYTES NFR BLD AUTO: 0.2 % (ref 0–0.5)
IRON SERPL-MCNC: 25 UG/DL (ref 30–160)
LDLC SERPL CALC-MCNC: 75.4 MG/DL (ref 63–159)
LYMPHOCYTES # BLD AUTO: 1.5 K/UL (ref 1–4.8)
LYMPHOCYTES NFR BLD: 25.3 % (ref 18–48)
MCH RBC QN AUTO: 23.2 PG (ref 27–31)
MCHC RBC AUTO-ENTMCNC: 29.9 G/DL (ref 32–36)
MCV RBC AUTO: 78 FL (ref 82–98)
MONOCYTES # BLD AUTO: 0.4 K/UL (ref 0.3–1)
MONOCYTES NFR BLD: 6.7 % (ref 4–15)
NEUTROPHILS # BLD AUTO: 3.7 K/UL (ref 1.8–7.7)
NEUTROPHILS NFR BLD: 62.9 % (ref 38–73)
NONHDLC SERPL-MCNC: 92 MG/DL
NRBC BLD-RTO: 0 /100 WBC
PLATELET # BLD AUTO: 229 K/UL (ref 150–450)
PMV BLD AUTO: 10 FL (ref 9.2–12.9)
POTASSIUM SERPL-SCNC: 4 MMOL/L (ref 3.5–5.1)
PROT SERPL-MCNC: 7 G/DL (ref 6–8.4)
RBC # BLD AUTO: 4.44 M/UL (ref 4–5.4)
RETICS/RBC NFR AUTO: 1.1 % (ref 0.5–2.5)
SATURATED IRON: 5 % (ref 20–50)
SODIUM SERPL-SCNC: 141 MMOL/L (ref 136–145)
TOTAL IRON BINDING CAPACITY: 469 UG/DL (ref 250–450)
TRANSFERRIN SERPL-MCNC: 317 MG/DL (ref 200–375)
TRIGL SERPL-MCNC: 83 MG/DL (ref 30–150)
TSH SERPL DL<=0.005 MIU/L-ACNC: 2.41 UIU/ML (ref 0.4–4)
UUN UR-MCNC: 13 MG/DL (ref 7–17)
VIT B12 SERPL-MCNC: 220 PG/ML (ref 210–950)
WBC # BLD AUTO: 5.93 K/UL (ref 3.9–12.7)

## 2023-06-30 PROCEDURE — 80053 COMPREHEN METABOLIC PANEL: CPT | Mod: PO | Performed by: FAMILY MEDICINE

## 2023-06-30 PROCEDURE — 84443 ASSAY THYROID STIM HORMONE: CPT | Mod: PO | Performed by: FAMILY MEDICINE

## 2023-06-30 PROCEDURE — 85025 COMPLETE CBC W/AUTO DIFF WBC: CPT | Mod: PO | Performed by: FAMILY MEDICINE

## 2023-06-30 PROCEDURE — 80061 LIPID PANEL: CPT | Performed by: FAMILY MEDICINE

## 2023-06-30 PROCEDURE — 83036 HEMOGLOBIN GLYCOSYLATED A1C: CPT | Performed by: FAMILY MEDICINE

## 2023-06-30 PROCEDURE — 85045 AUTOMATED RETICULOCYTE COUNT: CPT | Mod: PO | Performed by: FAMILY MEDICINE

## 2023-06-30 PROCEDURE — 36415 COLL VENOUS BLD VENIPUNCTURE: CPT | Mod: PO | Performed by: FAMILY MEDICINE

## 2023-06-30 PROCEDURE — 84466 ASSAY OF TRANSFERRIN: CPT | Mod: PO | Performed by: FAMILY MEDICINE

## 2023-06-30 PROCEDURE — 82728 ASSAY OF FERRITIN: CPT | Performed by: FAMILY MEDICINE

## 2023-06-30 PROCEDURE — 82746 ASSAY OF FOLIC ACID SERUM: CPT | Mod: PO | Performed by: FAMILY MEDICINE

## 2023-06-30 PROCEDURE — 82607 VITAMIN B-12: CPT | Mod: PO | Performed by: FAMILY MEDICINE

## 2023-07-03 ENCOUNTER — PATIENT MESSAGE (OUTPATIENT)
Dept: FAMILY MEDICINE | Facility: CLINIC | Age: 63
End: 2023-07-03
Payer: COMMERCIAL

## 2023-07-03 DIAGNOSIS — D50.9 IRON DEFICIENCY ANEMIA, UNSPECIFIED IRON DEFICIENCY ANEMIA TYPE: Primary | ICD-10-CM

## 2023-07-03 DIAGNOSIS — E53.8 FOLATE DEFICIENCY: ICD-10-CM

## 2023-07-03 DIAGNOSIS — R79.89 LOW VITAMIN B12 LEVEL: ICD-10-CM

## 2023-07-03 RX ORDER — MAGNESIUM 200 MG
1000 TABLET ORAL DAILY
Qty: 90 TABLET | Refills: 3 | Status: SHIPPED | OUTPATIENT
Start: 2023-07-03

## 2023-07-03 RX ORDER — FERROUS SULFATE 325(65) MG
325 TABLET ORAL
Qty: 90 TABLET | Refills: 3 | Status: SHIPPED | OUTPATIENT
Start: 2023-07-03

## 2023-07-03 RX ORDER — FOLIC ACID 1 MG/1
1 TABLET ORAL DAILY
Qty: 90 TABLET | Refills: 3 | Status: SHIPPED | OUTPATIENT
Start: 2023-07-03

## 2023-07-03 NOTE — TELEPHONE ENCOUNTER
Reviewed labs, portal message sent, repeat labs in 2 months below     Orders Placed This Encounter   Procedures    CBC Auto Differential    Iron and TIBC    Ferritin    Folate    Vitamin B12    Reticulocytes

## 2023-08-01 ENCOUNTER — HOSPITAL ENCOUNTER (OUTPATIENT)
Dept: RADIOLOGY | Facility: HOSPITAL | Age: 63
Discharge: HOME | End: 2023-08-01
Attending: FAMILY MEDICINE
Payer: COMMERCIAL

## 2023-08-01 DIAGNOSIS — Z12.2 SCREENING FOR LUNG CANCER: ICD-10-CM

## 2023-08-01 DIAGNOSIS — F17.200 TOBACCO DEPENDENCE: ICD-10-CM

## 2023-08-01 PROCEDURE — 71271 CT THORAX LUNG CANCER SCR C-: CPT | Mod: TC,PN

## 2023-08-01 PROCEDURE — 71271 CT CHEST LUNG SCREENING LOW DOSE: ICD-10-PCS | Mod: 26,,, | Performed by: RADIOLOGY

## 2023-08-01 PROCEDURE — 71271 CT THORAX LUNG CANCER SCR C-: CPT | Mod: 26,,, | Performed by: RADIOLOGY

## 2023-09-05 ENCOUNTER — PATIENT OUTREACH (OUTPATIENT)
Dept: ADMINISTRATIVE | Facility: HOSPITAL | Age: 63
End: 2023-09-05
Payer: COMMERCIAL

## 2023-09-05 NOTE — PROGRESS NOTES
Care Everywhere updates requested and reviewed.  Immunizations reconciled. Media reports reviewed.  Duplicate HM overrides and  orders removed.  Overdue HM topic chart audit and/or requested.  Overdue lab testing linked to upcoming lab appointments if applies.      Health Maintenance Due   Topic Date Due    COVID-19 Vaccine (1) Never done    Colorectal Cancer Screening  Never done    Shingles Vaccine (1 of 2) Never done    Mammogram  2022    Pneumococcal Vaccines (Age 0-64) (2 - PCV) 2022    Influenza Vaccine (1) 2023

## 2023-09-07 RX ORDER — BACLOFEN 10 MG/1
10 TABLET ORAL NIGHTLY PRN
Qty: 30 TABLET | Refills: 5 | Status: SHIPPED | OUTPATIENT
Start: 2023-09-07 | End: 2023-10-10 | Stop reason: SDUPTHER

## 2023-10-09 RX ORDER — FUROSEMIDE 40 MG/1
TABLET ORAL
Refills: 0 | OUTPATIENT
Start: 2023-10-09

## 2023-10-09 RX ORDER — LISINOPRIL AND HYDROCHLOROTHIAZIDE 12.5; 2 MG/1; MG/1
TABLET ORAL
Refills: 0 | OUTPATIENT
Start: 2023-10-09

## 2023-10-09 RX ORDER — ROPINIROLE 0.5 MG/1
TABLET, FILM COATED ORAL
Refills: 0 | OUTPATIENT
Start: 2023-10-09

## 2023-10-09 RX ORDER — GABAPENTIN 100 MG/1
CAPSULE ORAL
Refills: 0 | OUTPATIENT
Start: 2023-10-09

## 2023-10-09 RX ORDER — BACLOFEN 10 MG/1
TABLET ORAL
Refills: 0 | OUTPATIENT
Start: 2023-10-09

## 2023-10-09 RX ORDER — SERTRALINE HYDROCHLORIDE 100 MG/1
TABLET, FILM COATED ORAL
Refills: 0 | OUTPATIENT
Start: 2023-10-09

## 2023-10-09 NOTE — TELEPHONE ENCOUNTER
Refill Decision Note   [facility]  is requesting a refill authorization.  Brief Assessment and Rationale for Refill:  Quick Discontinue     Medication Therapy Plan:    Pharmacy is requesting new scripts for the following medications without required information, (sig/ frequency/qty/etc)      Medication Reconciliation Completed: No     Comments: Pharmacies have been requesting medications for patients without required information, (sig, frequency, qty, etc.). In addition, requests are sent for medication(s) pt. are currently not taking, and medications patients have never taken.    We have spoken to the pharmacies about these request types and advised their teams previously that we are unable to assess these New Script requests and require all details for these requests. This is a known issue and has been reported.     Note composed:5:18 PM 10/09/2023

## 2023-10-09 NOTE — TELEPHONE ENCOUNTER
No care due was identified.  [facility] Embedded Care Due Messages. Reference number: [id number].   10/09/2023 2:02:20 PM CDT

## 2023-10-10 ENCOUNTER — PATIENT MESSAGE (OUTPATIENT)
Dept: FAMILY MEDICINE | Facility: CLINIC | Age: 63
End: 2023-10-10
Payer: COMMERCIAL

## 2023-10-10 RX ORDER — ROPINIROLE 0.5 MG/1
0.5 TABLET, FILM COATED ORAL NIGHTLY
Qty: 90 TABLET | Refills: 3 | Status: SHIPPED | OUTPATIENT
Start: 2023-10-10

## 2023-10-10 RX ORDER — LISINOPRIL AND HYDROCHLOROTHIAZIDE 12.5; 2 MG/1; MG/1
2 TABLET ORAL DAILY
Qty: 180 TABLET | Refills: 11 | Status: SHIPPED | OUTPATIENT
Start: 2023-10-10

## 2023-10-10 RX ORDER — GABAPENTIN 100 MG/1
100 CAPSULE ORAL 3 TIMES DAILY
Qty: 270 CAPSULE | Refills: 11 | Status: SHIPPED | OUTPATIENT
Start: 2023-10-10

## 2023-10-10 RX ORDER — FUROSEMIDE 40 MG/1
40 TABLET ORAL DAILY
Qty: 90 TABLET | Refills: 3 | Status: SHIPPED | OUTPATIENT
Start: 2023-10-10

## 2023-10-10 RX ORDER — BACLOFEN 10 MG/1
10 TABLET ORAL NIGHTLY PRN
Qty: 90 TABLET | Refills: 5 | Status: SHIPPED | OUTPATIENT
Start: 2023-10-10

## 2023-10-10 RX ORDER — SERTRALINE HYDROCHLORIDE 100 MG/1
200 TABLET, FILM COATED ORAL DAILY
Qty: 180 TABLET | Refills: 3 | Status: SHIPPED | OUTPATIENT
Start: 2023-10-10

## 2023-10-10 NOTE — TELEPHONE ENCOUNTER
No care due was identified.  [facility] Embedded Care Due Messages. Reference number: [id number].   10/10/2023 8:59:37 AM CDT

## 2023-11-04 ENCOUNTER — PATIENT MESSAGE (OUTPATIENT)
Dept: ADMINISTRATIVE | Facility: HOSPITAL | Age: 63
End: 2023-11-04
Payer: COMMERCIAL

## 2023-12-26 ENCOUNTER — HOSPITAL ENCOUNTER (INPATIENT)
Facility: HOSPITAL | Age: 63
LOS: 3 days | Discharge: HOME HEALTH | DRG: 190 | End: 2023-12-31
Attending: EMERGENCY MEDICINE | Admitting: FAMILY MEDICINE
Payer: COMMERCIAL

## 2023-12-26 DIAGNOSIS — F17.200 SMOKING: ICD-10-CM

## 2023-12-26 DIAGNOSIS — R06.02 SOB (SHORTNESS OF BREATH): ICD-10-CM

## 2023-12-26 DIAGNOSIS — J44.1 COPD EXACERBATION: Primary | ICD-10-CM

## 2023-12-26 DIAGNOSIS — R09.02 HYPOXIA: ICD-10-CM

## 2023-12-26 LAB
ALBUMIN SERPL BCP-MCNC: 3.5 G/DL (ref 3.5–5.2)
ALLENS TEST: ABNORMAL
ALP SERPL-CCNC: 86 U/L (ref 38–126)
ALT SERPL W/O P-5'-P-CCNC: 17 U/L (ref 10–44)
ANION GAP SERPL CALC-SCNC: 9 MMOL/L (ref 8–16)
AST SERPL-CCNC: 19 U/L (ref 15–46)
BASOPHILS # BLD AUTO: 0.04 K/UL (ref 0–0.2)
BASOPHILS NFR BLD: 0.7 % (ref 0–1.9)
BILIRUB SERPL-MCNC: 0.4 MG/DL (ref 0.1–1)
CALCIUM SERPL-MCNC: 8.6 MG/DL (ref 8.7–10.5)
CHLORIDE SERPL-SCNC: 105 MMOL/L (ref 95–110)
CO2 SERPL-SCNC: 25 MMOL/L (ref 23–29)
CREAT SERPL-MCNC: 0.86 MG/DL (ref 0.5–1.4)
D DIMER PPP IA.FEU-MCNC: 0.99 MG/L FEU
DELSYS: ABNORMAL
DIFFERENTIAL METHOD BLD: ABNORMAL
EOSINOPHIL # BLD AUTO: 0.2 K/UL (ref 0–0.5)
EOSINOPHIL NFR BLD: 3 % (ref 0–8)
ERYTHROCYTE [DISTWIDTH] IN BLOOD BY AUTOMATED COUNT: 17.3 % (ref 11.5–14.5)
ERYTHROCYTE [SEDIMENTATION RATE] IN BLOOD BY WESTERGREN METHOD: 24 MM/H
EST. GFR  (NO RACE VARIABLE): >60 ML/MIN/1.73 M^2
FIO2: 28
FLOW: 2
GLUCOSE SERPL-MCNC: 111 MG/DL (ref 70–110)
HCO3 UR-SCNC: 25.5 MMOL/L (ref 24–28)
HCT VFR BLD AUTO: 37.9 % (ref 37–48.5)
HGB BLD-MCNC: 11.7 G/DL (ref 12–16)
IMM GRANULOCYTES # BLD AUTO: 0.02 K/UL (ref 0–0.04)
IMM GRANULOCYTES NFR BLD AUTO: 0.3 % (ref 0–0.5)
INFLUENZA A, MOLECULAR: NEGATIVE
INFLUENZA B, MOLECULAR: NEGATIVE
LYMPHOCYTES # BLD AUTO: 0.7 K/UL (ref 1–4.8)
LYMPHOCYTES NFR BLD: 11.4 % (ref 18–48)
MCH RBC QN AUTO: 27 PG (ref 27–31)
MCHC RBC AUTO-ENTMCNC: 30.9 G/DL (ref 32–36)
MCV RBC AUTO: 88 FL (ref 82–98)
MODE: ABNORMAL
MONOCYTES # BLD AUTO: 0.5 K/UL (ref 0.3–1)
MONOCYTES NFR BLD: 7.9 % (ref 4–15)
NEUTROPHILS # BLD AUTO: 4.7 K/UL (ref 1.8–7.7)
NEUTROPHILS NFR BLD: 76.7 % (ref 38–73)
NRBC BLD-RTO: 0 /100 WBC
PCO2 BLDA: 48.4 MMHG (ref 35–45)
PH SMN: 7.33 [PH] (ref 7.35–7.45)
PLATELET # BLD AUTO: 138 K/UL (ref 150–450)
PMV BLD AUTO: 10.8 FL (ref 9.2–12.9)
PO2 BLDA: 56 MMHG (ref 80–100)
POC BE: 0 MMOL/L
POC SATURATED O2: 86 % (ref 95–100)
POC TCO2: 27 MMOL/L (ref 23–27)
POTASSIUM SERPL-SCNC: 3.5 MMOL/L (ref 3.5–5.1)
PROT SERPL-MCNC: 6.5 G/DL (ref 6–8.4)
RBC # BLD AUTO: 4.33 M/UL (ref 4–5.4)
SAMPLE: ABNORMAL
SARS-COV-2 RDRP RESP QL NAA+PROBE: NEGATIVE
SITE: ABNORMAL
SODIUM SERPL-SCNC: 139 MMOL/L (ref 136–145)
SP02: 92
SPECIMEN SOURCE: NORMAL
TROPONIN I SERPL-MCNC: <0.012 NG/ML (ref 0.01–0.03)
UUN UR-MCNC: 10 MG/DL (ref 7–17)
WBC # BLD AUTO: 6.07 K/UL (ref 3.9–12.7)

## 2023-12-26 PROCEDURE — G0378 HOSPITAL OBSERVATION PER HR: HCPCS | Mod: ER

## 2023-12-26 PROCEDURE — 85025 COMPLETE CBC W/AUTO DIFF WBC: CPT | Mod: ER | Performed by: EMERGENCY MEDICINE

## 2023-12-26 PROCEDURE — 96375 TX/PRO/DX INJ NEW DRUG ADDON: CPT | Mod: ER

## 2023-12-26 PROCEDURE — 36600 WITHDRAWAL OF ARTERIAL BLOOD: CPT | Mod: ER

## 2023-12-26 PROCEDURE — 80053 COMPREHEN METABOLIC PANEL: CPT | Mod: ER | Performed by: EMERGENCY MEDICINE

## 2023-12-26 PROCEDURE — 84484 ASSAY OF TROPONIN QUANT: CPT | Mod: ER | Performed by: EMERGENCY MEDICINE

## 2023-12-26 PROCEDURE — 93005 ELECTROCARDIOGRAM TRACING: CPT | Mod: ER

## 2023-12-26 PROCEDURE — 82803 BLOOD GASES ANY COMBINATION: CPT | Mod: ER

## 2023-12-26 PROCEDURE — 99285 EMERGENCY DEPT VISIT HI MDM: CPT | Mod: 25,ER

## 2023-12-26 PROCEDURE — 25500020 PHARM REV CODE 255: Mod: ER | Performed by: EMERGENCY MEDICINE

## 2023-12-26 PROCEDURE — 25000003 PHARM REV CODE 250: Mod: ER | Performed by: EMERGENCY MEDICINE

## 2023-12-26 PROCEDURE — 85379 FIBRIN DEGRADATION QUANT: CPT | Mod: ER | Performed by: EMERGENCY MEDICINE

## 2023-12-26 PROCEDURE — 63600175 PHARM REV CODE 636 W HCPCS: Mod: ER | Performed by: EMERGENCY MEDICINE

## 2023-12-26 PROCEDURE — 27000221 HC OXYGEN, UP TO 24 HOURS: Mod: ER

## 2023-12-26 PROCEDURE — 94640 AIRWAY INHALATION TREATMENT: CPT | Mod: ER,XB

## 2023-12-26 PROCEDURE — U0002 COVID-19 LAB TEST NON-CDC: HCPCS | Mod: ER | Performed by: EMERGENCY MEDICINE

## 2023-12-26 PROCEDURE — 63700000 PHARM REV CODE 250 ALT 637 W/O HCPCS: Mod: ER | Performed by: EMERGENCY MEDICINE

## 2023-12-26 PROCEDURE — 25000242 PHARM REV CODE 250 ALT 637 W/ HCPCS: Mod: ER | Performed by: EMERGENCY MEDICINE

## 2023-12-26 PROCEDURE — 87502 INFLUENZA DNA AMP PROBE: CPT | Mod: ER | Performed by: EMERGENCY MEDICINE

## 2023-12-26 PROCEDURE — 94640 AIRWAY INHALATION TREATMENT: CPT | Mod: ER

## 2023-12-26 PROCEDURE — 99900035 HC TECH TIME PER 15 MIN (STAT): Mod: ER

## 2023-12-26 RX ORDER — ALBUTEROL SULFATE 90 UG/1
2 AEROSOL, METERED RESPIRATORY (INHALATION)
Status: COMPLETED | OUTPATIENT
Start: 2023-12-26 | End: 2023-12-26

## 2023-12-26 RX ORDER — ALBUTEROL SULFATE 2.5 MG/.5ML
2.5 SOLUTION RESPIRATORY (INHALATION)
Status: COMPLETED | OUTPATIENT
Start: 2023-12-26 | End: 2023-12-26

## 2023-12-26 RX ORDER — DEXAMETHASONE SODIUM PHOSPHATE 4 MG/ML
8 INJECTION, SOLUTION INTRA-ARTICULAR; INTRALESIONAL; INTRAMUSCULAR; INTRAVENOUS; SOFT TISSUE
Status: COMPLETED | OUTPATIENT
Start: 2023-12-26 | End: 2023-12-26

## 2023-12-26 RX ORDER — AZITHROMYCIN 250 MG/1
500 TABLET, FILM COATED ORAL
Status: COMPLETED | OUTPATIENT
Start: 2023-12-26 | End: 2023-12-26

## 2023-12-26 RX ORDER — BENZONATATE 100 MG/1
200 CAPSULE ORAL
Status: COMPLETED | OUTPATIENT
Start: 2023-12-26 | End: 2023-12-26

## 2023-12-26 RX ORDER — KETOROLAC TROMETHAMINE 30 MG/ML
15 INJECTION, SOLUTION INTRAMUSCULAR; INTRAVENOUS
Status: COMPLETED | OUTPATIENT
Start: 2023-12-26 | End: 2023-12-26

## 2023-12-26 RX ADMIN — KETOROLAC TROMETHAMINE 15 MG: 30 INJECTION, SOLUTION INTRAMUSCULAR at 09:12

## 2023-12-26 RX ADMIN — ALBUTEROL SULFATE 2.5 MG: 2.5 SOLUTION RESPIRATORY (INHALATION) at 09:12

## 2023-12-26 RX ADMIN — AZITHROMYCIN DIHYDRATE 500 MG: 250 TABLET ORAL at 11:12

## 2023-12-26 RX ADMIN — ALBUTEROL SULFATE 2 PUFF: 90 AEROSOL, METERED RESPIRATORY (INHALATION) at 08:12

## 2023-12-26 RX ADMIN — ALBUTEROL SULFATE 2.5 MG: 2.5 SOLUTION RESPIRATORY (INHALATION) at 11:12

## 2023-12-26 RX ADMIN — BENZONATATE 200 MG: 100 CAPSULE ORAL at 08:12

## 2023-12-26 RX ADMIN — IOHEXOL 120 ML: 350 INJECTION, SOLUTION INTRAVENOUS at 10:12

## 2023-12-26 RX ADMIN — DEXAMETHASONE SODIUM PHOSPHATE 8 MG: 4 INJECTION, SOLUTION INTRA-ARTICULAR; INTRALESIONAL; INTRAMUSCULAR; INTRAVENOUS; SOFT TISSUE at 11:12

## 2023-12-26 NOTE — Clinical Note
Diagnosis: SOB (shortness of breath) [363817]   Future Attending Provider: INNOCENT-YEMI STORM [[id number]]   Admitting Provider:: AMINAH MELGAR [[id number]]

## 2023-12-27 PROBLEM — J44.1 COPD WITH ACUTE EXACERBATION: Chronic | Status: ACTIVE | Noted: 2021-06-30

## 2023-12-27 PROBLEM — G25.81 RESTLESS LEG SYNDROME: Status: ACTIVE | Noted: 2023-12-27

## 2023-12-27 LAB
ALLENS TEST: ABNORMAL
ANION GAP SERPL CALC-SCNC: 11 MMOL/L (ref 8–16)
BASOPHILS # BLD AUTO: 0.03 K/UL (ref 0–0.2)
BASOPHILS NFR BLD: 0.4 % (ref 0–1.9)
BUN SERPL-MCNC: 11 MG/DL (ref 8–23)
CALCIUM SERPL-MCNC: 8.4 MG/DL (ref 8.7–10.5)
CHLORIDE SERPL-SCNC: 106 MMOL/L (ref 95–110)
CO2 SERPL-SCNC: 22 MMOL/L (ref 23–29)
CREAT SERPL-MCNC: 0.8 MG/DL (ref 0.5–1.4)
DIFFERENTIAL METHOD BLD: ABNORMAL
EOSINOPHIL # BLD AUTO: 0 K/UL (ref 0–0.5)
EOSINOPHIL NFR BLD: 0.1 % (ref 0–8)
ERYTHROCYTE [DISTWIDTH] IN BLOOD BY AUTOMATED COUNT: 17.6 % (ref 11.5–14.5)
EST. GFR  (NO RACE VARIABLE): >60 ML/MIN/1.73 M^2
FIO2: 21 %
GLUCOSE SERPL-MCNC: 155 MG/DL (ref 70–110)
HCT VFR BLD AUTO: 36.4 % (ref 37–48.5)
HGB BLD-MCNC: 11.4 G/DL (ref 12–16)
IMM GRANULOCYTES # BLD AUTO: 0.03 K/UL (ref 0–0.04)
IMM GRANULOCYTES NFR BLD AUTO: 0.4 % (ref 0–0.5)
LPM: 3
LYMPHOCYTES # BLD AUTO: 0.3 K/UL (ref 1–4.8)
LYMPHOCYTES NFR BLD: 4.3 % (ref 18–48)
MAGNESIUM SERPL-MCNC: 2.1 MG/DL (ref 1.6–2.6)
MCH RBC QN AUTO: 26.9 PG (ref 27–31)
MCHC RBC AUTO-ENTMCNC: 31.3 G/DL (ref 32–36)
MCV RBC AUTO: 86 FL (ref 82–98)
MONOCYTES # BLD AUTO: 0.3 K/UL (ref 0.3–1)
MONOCYTES NFR BLD: 4 % (ref 4–15)
NEUTROPHILS # BLD AUTO: 6.6 K/UL (ref 1.8–7.7)
NEUTROPHILS NFR BLD: 90.8 % (ref 38–73)
NRBC BLD-RTO: 0 /100 WBC
PCO2 BLDA: 44.8 MMHG (ref 35–45)
PH SMN: 7.39 [PH] (ref 7.35–7.45)
PHOSPHATE SERPL-MCNC: 3.7 MG/DL (ref 2.7–4.5)
PLATELET # BLD AUTO: 132 K/UL (ref 150–450)
PMV BLD AUTO: 11.1 FL (ref 9.2–12.9)
PO2 BLDA: 56.3 MMHG (ref 80–100)
POC BASE DEFICIT: 1.5 MMOL/L (ref -2–2)
POC HCO3: 26.9 MMOL/L (ref 24–28)
POC PERFORMED BY: ABNORMAL
POC SATURATED O2: 87.4 % (ref 95–100)
POTASSIUM SERPL-SCNC: 3.6 MMOL/L (ref 3.5–5.1)
RBC # BLD AUTO: 4.24 M/UL (ref 4–5.4)
SODIUM SERPL-SCNC: 139 MMOL/L (ref 136–145)
SPECIMEN SOURCE: ABNORMAL
WBC # BLD AUTO: 7.26 K/UL (ref 3.9–12.7)

## 2023-12-27 PROCEDURE — 25000003 PHARM REV CODE 250: Mod: ER | Performed by: FAMILY MEDICINE

## 2023-12-27 PROCEDURE — 63600175 PHARM REV CODE 636 W HCPCS: Performed by: STUDENT IN AN ORGANIZED HEALTH CARE EDUCATION/TRAINING PROGRAM

## 2023-12-27 PROCEDURE — 99900035 HC TECH TIME PER 15 MIN (STAT)

## 2023-12-27 PROCEDURE — 25000003 PHARM REV CODE 250: Performed by: STUDENT IN AN ORGANIZED HEALTH CARE EDUCATION/TRAINING PROGRAM

## 2023-12-27 PROCEDURE — 96375 TX/PRO/DX INJ NEW DRUG ADDON: CPT

## 2023-12-27 PROCEDURE — 94640 AIRWAY INHALATION TREATMENT: CPT | Mod: XB

## 2023-12-27 PROCEDURE — 25000242 PHARM REV CODE 250 ALT 637 W/ HCPCS: Performed by: STUDENT IN AN ORGANIZED HEALTH CARE EDUCATION/TRAINING PROGRAM

## 2023-12-27 PROCEDURE — 96366 THER/PROPH/DIAG IV INF ADDON: CPT

## 2023-12-27 PROCEDURE — G0378 HOSPITAL OBSERVATION PER HR: HCPCS | Mod: ER

## 2023-12-27 PROCEDURE — 27000221 HC OXYGEN, UP TO 24 HOURS

## 2023-12-27 PROCEDURE — 63600175 PHARM REV CODE 636 W HCPCS: Performed by: NURSE PRACTITIONER

## 2023-12-27 PROCEDURE — G0378 HOSPITAL OBSERVATION PER HR: HCPCS

## 2023-12-27 PROCEDURE — 83735 ASSAY OF MAGNESIUM: CPT | Performed by: STUDENT IN AN ORGANIZED HEALTH CARE EDUCATION/TRAINING PROGRAM

## 2023-12-27 PROCEDURE — 84100 ASSAY OF PHOSPHORUS: CPT | Performed by: STUDENT IN AN ORGANIZED HEALTH CARE EDUCATION/TRAINING PROGRAM

## 2023-12-27 PROCEDURE — 96365 THER/PROPH/DIAG IV INF INIT: CPT

## 2023-12-27 PROCEDURE — 80048 BASIC METABOLIC PNL TOTAL CA: CPT | Performed by: STUDENT IN AN ORGANIZED HEALTH CARE EDUCATION/TRAINING PROGRAM

## 2023-12-27 PROCEDURE — 82803 BLOOD GASES ANY COMBINATION: CPT

## 2023-12-27 PROCEDURE — 94761 N-INVAS EAR/PLS OXIMETRY MLT: CPT | Mod: XB

## 2023-12-27 PROCEDURE — 25000003 PHARM REV CODE 250: Performed by: FAMILY MEDICINE

## 2023-12-27 PROCEDURE — 25000003 PHARM REV CODE 250: Performed by: NURSE PRACTITIONER

## 2023-12-27 PROCEDURE — 36415 COLL VENOUS BLD VENIPUNCTURE: CPT | Performed by: STUDENT IN AN ORGANIZED HEALTH CARE EDUCATION/TRAINING PROGRAM

## 2023-12-27 PROCEDURE — 36600 WITHDRAWAL OF ARTERIAL BLOOD: CPT

## 2023-12-27 PROCEDURE — 25000003 PHARM REV CODE 250: Performed by: INTERNAL MEDICINE

## 2023-12-27 PROCEDURE — 85025 COMPLETE CBC W/AUTO DIFF WBC: CPT | Performed by: STUDENT IN AN ORGANIZED HEALTH CARE EDUCATION/TRAINING PROGRAM

## 2023-12-27 RX ORDER — LISINOPRIL 20 MG/1
20 TABLET ORAL DAILY
Status: DISCONTINUED | OUTPATIENT
Start: 2023-12-27 | End: 2023-12-31 | Stop reason: HOSPADM

## 2023-12-27 RX ORDER — SODIUM CHLORIDE 0.9 % (FLUSH) 0.9 %
3 SYRINGE (ML) INJECTION
Status: DISCONTINUED | OUTPATIENT
Start: 2023-12-27 | End: 2023-12-31 | Stop reason: HOSPADM

## 2023-12-27 RX ORDER — HYDROCHLOROTHIAZIDE 12.5 MG/1
12.5 TABLET ORAL DAILY
Status: DISCONTINUED | OUTPATIENT
Start: 2023-12-27 | End: 2023-12-31 | Stop reason: HOSPADM

## 2023-12-27 RX ORDER — SUCRALFATE 1 G/1
1 TABLET ORAL
Status: DISCONTINUED | OUTPATIENT
Start: 2023-12-27 | End: 2023-12-31 | Stop reason: HOSPADM

## 2023-12-27 RX ORDER — CETIRIZINE HYDROCHLORIDE 10 MG/1
10 TABLET ORAL DAILY
Status: DISCONTINUED | OUTPATIENT
Start: 2023-12-27 | End: 2023-12-31 | Stop reason: HOSPADM

## 2023-12-27 RX ORDER — BENZONATATE 100 MG/1
100 CAPSULE ORAL 3 TIMES DAILY
Status: DISCONTINUED | OUTPATIENT
Start: 2023-12-27 | End: 2023-12-31 | Stop reason: HOSPADM

## 2023-12-27 RX ORDER — BUTALBITAL, ACETAMINOPHEN AND CAFFEINE 50; 325; 40 MG/1; MG/1; MG/1
1 TABLET ORAL EVERY 4 HOURS PRN
Status: DISCONTINUED | OUTPATIENT
Start: 2023-12-27 | End: 2023-12-31 | Stop reason: HOSPADM

## 2023-12-27 RX ORDER — ACETAMINOPHEN 325 MG/1
650 TABLET ORAL EVERY 8 HOURS PRN
Status: DISCONTINUED | OUTPATIENT
Start: 2023-12-27 | End: 2023-12-27

## 2023-12-27 RX ORDER — SERTRALINE HYDROCHLORIDE 50 MG/1
200 TABLET, FILM COATED ORAL DAILY
Status: DISCONTINUED | OUTPATIENT
Start: 2023-12-27 | End: 2023-12-31 | Stop reason: HOSPADM

## 2023-12-27 RX ORDER — FLUTICASONE FUROATE AND VILANTEROL 100; 25 UG/1; UG/1
1 POWDER RESPIRATORY (INHALATION) DAILY
Status: DISCONTINUED | OUTPATIENT
Start: 2023-12-27 | End: 2023-12-31 | Stop reason: HOSPADM

## 2023-12-27 RX ORDER — MAGNESIUM SULFATE HEPTAHYDRATE 40 MG/ML
2 INJECTION, SOLUTION INTRAVENOUS ONCE
Status: COMPLETED | OUTPATIENT
Start: 2023-12-27 | End: 2023-12-27

## 2023-12-27 RX ORDER — POLYETHYLENE GLYCOL 3350 17 G/17G
17 POWDER, FOR SOLUTION ORAL DAILY
Status: DISCONTINUED | OUTPATIENT
Start: 2023-12-27 | End: 2023-12-31 | Stop reason: HOSPADM

## 2023-12-27 RX ORDER — ACETAMINOPHEN 500 MG
1000 TABLET ORAL
Status: COMPLETED | OUTPATIENT
Start: 2023-12-27 | End: 2023-12-27

## 2023-12-27 RX ORDER — IPRATROPIUM BROMIDE AND ALBUTEROL SULFATE 2.5; .5 MG/3ML; MG/3ML
3 SOLUTION RESPIRATORY (INHALATION) EVERY 4 HOURS PRN
Status: DISCONTINUED | OUTPATIENT
Start: 2023-12-27 | End: 2023-12-31 | Stop reason: HOSPADM

## 2023-12-27 RX ORDER — LANOLIN ALCOHOL/MO/W.PET/CERES
1 CREAM (GRAM) TOPICAL DAILY
Status: DISCONTINUED | OUTPATIENT
Start: 2023-12-27 | End: 2023-12-31 | Stop reason: HOSPADM

## 2023-12-27 RX ORDER — FOLIC ACID 1 MG/1
1 TABLET ORAL DAILY
Status: DISCONTINUED | OUTPATIENT
Start: 2023-12-27 | End: 2023-12-31 | Stop reason: HOSPADM

## 2023-12-27 RX ORDER — ONDANSETRON 2 MG/ML
4 INJECTION INTRAMUSCULAR; INTRAVENOUS EVERY 12 HOURS PRN
Status: DISCONTINUED | OUTPATIENT
Start: 2023-12-27 | End: 2023-12-31 | Stop reason: HOSPADM

## 2023-12-27 RX ORDER — PANTOPRAZOLE SODIUM 40 MG/1
40 TABLET, DELAYED RELEASE ORAL DAILY
Status: DISCONTINUED | OUTPATIENT
Start: 2023-12-27 | End: 2023-12-31 | Stop reason: HOSPADM

## 2023-12-27 RX ORDER — ACETAMINOPHEN 325 MG/1
650 TABLET ORAL EVERY 8 HOURS PRN
Status: DISCONTINUED | OUTPATIENT
Start: 2023-12-27 | End: 2023-12-31 | Stop reason: HOSPADM

## 2023-12-27 RX ORDER — TALC
3 POWDER (GRAM) TOPICAL NIGHTLY PRN
Status: COMPLETED | OUTPATIENT
Start: 2023-12-27 | End: 2023-12-29

## 2023-12-27 RX ORDER — LISINOPRIL AND HYDROCHLOROTHIAZIDE 12.5; 2 MG/1; MG/1
2 TABLET ORAL DAILY
Status: DISCONTINUED | OUTPATIENT
Start: 2023-12-27 | End: 2023-12-27

## 2023-12-27 RX ORDER — IPRATROPIUM BROMIDE AND ALBUTEROL SULFATE 2.5; .5 MG/3ML; MG/3ML
3 SOLUTION RESPIRATORY (INHALATION)
Status: DISCONTINUED | OUTPATIENT
Start: 2023-12-27 | End: 2023-12-29

## 2023-12-27 RX ORDER — ROPINIROLE 0.25 MG/1
0.5 TABLET, FILM COATED ORAL NIGHTLY
Status: DISCONTINUED | OUTPATIENT
Start: 2023-12-27 | End: 2023-12-31 | Stop reason: HOSPADM

## 2023-12-27 RX ORDER — GABAPENTIN 100 MG/1
100 CAPSULE ORAL 3 TIMES DAILY
Status: DISCONTINUED | OUTPATIENT
Start: 2023-12-27 | End: 2023-12-31 | Stop reason: HOSPADM

## 2023-12-27 RX ORDER — PREDNISONE 20 MG/1
40 TABLET ORAL DAILY
Status: DISCONTINUED | OUTPATIENT
Start: 2023-12-27 | End: 2023-12-28

## 2023-12-27 RX ORDER — FLUTICASONE PROPIONATE 50 MCG
1 SPRAY, SUSPENSION (ML) NASAL 2 TIMES DAILY
Status: DISCONTINUED | OUTPATIENT
Start: 2023-12-27 | End: 2023-12-31 | Stop reason: HOSPADM

## 2023-12-27 RX ADMIN — IPRATROPIUM BROMIDE AND ALBUTEROL SULFATE 3 ML: 2.5; .5 SOLUTION RESPIRATORY (INHALATION) at 02:12

## 2023-12-27 RX ADMIN — SUCRALFATE 1 G: 1 TABLET ORAL at 11:12

## 2023-12-27 RX ADMIN — ROPINIROLE HYDROCHLORIDE 0.5 MG: 0.25 TABLET, FILM COATED ORAL at 09:12

## 2023-12-27 RX ADMIN — FOLIC ACID 1 MG: 1 TABLET ORAL at 09:12

## 2023-12-27 RX ADMIN — ACETAMINOPHEN 1000 MG: 500 TABLET ORAL at 12:12

## 2023-12-27 RX ADMIN — GABAPENTIN 100 MG: 100 CAPSULE ORAL at 09:12

## 2023-12-27 RX ADMIN — IPRATROPIUM BROMIDE AND ALBUTEROL SULFATE 3 ML: 2.5; .5 SOLUTION RESPIRATORY (INHALATION) at 11:12

## 2023-12-27 RX ADMIN — FLUTICASONE PROPIONATE 50 MCG: 50 SPRAY, METERED NASAL at 09:12

## 2023-12-27 RX ADMIN — PANTOPRAZOLE SODIUM 40 MG: 40 TABLET, DELAYED RELEASE ORAL at 09:12

## 2023-12-27 RX ADMIN — HYDROCHLOROTHIAZIDE 12.5 MG: 12.5 TABLET ORAL at 09:12

## 2023-12-27 RX ADMIN — MAGNESIUM SULFATE IN WATER 2 G: 40 INJECTION, SOLUTION INTRAVENOUS at 11:12

## 2023-12-27 RX ADMIN — FERROUS SULFATE TAB 325 MG (65 MG ELEMENTAL FE) 1 EACH: 325 (65 FE) TAB at 09:12

## 2023-12-27 RX ADMIN — ONDANSETRON 4 MG: 2 INJECTION INTRAMUSCULAR; INTRAVENOUS at 01:12

## 2023-12-27 RX ADMIN — ROPINIROLE HYDROCHLORIDE 0.5 MG: 0.25 TABLET, FILM COATED ORAL at 02:12

## 2023-12-27 RX ADMIN — BUTALBITAL, ACETAMINOPHEN, AND CAFFEINE 1 TABLET: 50; 325; 40 TABLET ORAL at 04:12

## 2023-12-27 RX ADMIN — IPRATROPIUM BROMIDE AND ALBUTEROL SULFATE 3 ML: 2.5; .5 SOLUTION RESPIRATORY (INHALATION) at 07:12

## 2023-12-27 RX ADMIN — BENZONATATE 100 MG: 100 CAPSULE ORAL at 09:12

## 2023-12-27 RX ADMIN — Medication 3 MG: at 09:12

## 2023-12-27 RX ADMIN — ACETAMINOPHEN 650 MG: 325 TABLET ORAL at 09:12

## 2023-12-27 RX ADMIN — SERTRALINE HYDROCHLORIDE 200 MG: 50 TABLET ORAL at 09:12

## 2023-12-27 RX ADMIN — POLYETHYLENE GLYCOL 3350 17 G: 17 POWDER, FOR SOLUTION ORAL at 09:12

## 2023-12-27 RX ADMIN — GABAPENTIN 100 MG: 100 CAPSULE ORAL at 04:12

## 2023-12-27 RX ADMIN — CETIRIZINE HYDROCHLORIDE 10 MG: 10 TABLET, FILM COATED ORAL at 09:12

## 2023-12-27 RX ADMIN — PREDNISONE 40 MG: 20 TABLET ORAL at 09:12

## 2023-12-27 RX ADMIN — SUCRALFATE 1 G: 1 TABLET ORAL at 04:12

## 2023-12-27 RX ADMIN — SUCRALFATE 1 G: 1 TABLET ORAL at 09:12

## 2023-12-27 RX ADMIN — FLUTICASONE FUROATE AND VILANTEROL TRIFENATATE 1 PUFF: 100; 25 POWDER RESPIRATORY (INHALATION) at 08:12

## 2023-12-27 RX ADMIN — BENZONATATE 100 MG: 100 CAPSULE ORAL at 04:12

## 2023-12-27 RX ADMIN — LISINOPRIL 20 MG: 20 TABLET ORAL at 09:12

## 2023-12-27 NOTE — ASSESSMENT & PLAN NOTE
Chronic, controlled. Latest blood pressure and vitals reviewed-     Temp:  [98.4 °F (36.9 °C)-100.1 °F (37.8 °C)]   Pulse:  []   Resp:  [20-51]   BP: (125-171)/()   SpO2:  [90 %-98 %] .   Home meds for hypertension were reviewed and noted below.   Hypertension Medications               furosemide (LASIX) 40 MG tablet Take 1 tablet (40 mg total) by mouth once daily.    lisinopriL-hydrochlorothiazide (PRINZIDE,ZESTORETIC) 20-12.5 mg per tablet Take 2 tablets by mouth once daily.            While in the hospital, will manage blood pressure as follows; Continue home antihypertensive regimen    Will utilize p.r.n. blood pressure medication only if patient's blood pressure greater than 180/110 and she develops symptoms such as worsening chest pain or shortness of breath.

## 2023-12-27 NOTE — ED PROVIDER NOTES
Encounter Date: 2023       History     Chief Complaint   Patient presents with    Cough     Productive Cough (clear sputum), Headache, Body aches, and SOB. PT states she felt like she was getting sick last Wednesday. Unknown if fever is present patient reports getting hot and cold. Labored breathing on triage.      HPI  63 y.o.   Few days of cough, frontal ha, myalgias  H/o COPD  Was on trip recently  Not vaccinated for covid nor flu  Smoker    Review of patient's allergies indicates:   Allergen Reactions    Penicillins Hives     Past Medical History:   Diagnosis Date    Anxiety     COPD (chronic obstructive pulmonary disease)     GERD (gastroesophageal reflux disease)     History of stomach ulcers     Hypertension     Restless leg syndrome      Past Surgical History:   Procedure Laterality Date    gastric by pass      HERNIA REPAIR      HYSTERECTOMY      OOPHORECTOMY Right      Family History   Problem Relation Age of Onset    COPD Father     Lung cancer Father     Colon cancer Maternal Grandmother         Colon cancer    COPD Mother             Multiple sclerosis Son     Heart disease Neg Hx     Stroke Neg Hx      Social History     Tobacco Use    Smoking status: Every Day     Current packs/day: 1.00     Average packs/day: 1 pack/day for 52.0 years (52.0 ttl pk-yrs)     Types: Cigarettes     Start date:     Smokeless tobacco: Never    Tobacco comments:     Enrolled in the [employer] on 21 ([facility] Member ID # [id number]). Ambulatory referral to Smoking Cessation program.   Substance Use Topics    Alcohol use: Never    Drug use: No     Review of Systems  All systems were reviewed/examined and were negative except as noted in the HPI.    Physical Exam     Initial Vitals   BP Pulse Resp Temp SpO2   23 2101 23   (!) 155/117 103 (!) 22 100.1 °F (37.8 °C) (!) 90 %      MAP       --                Physical Exam    General: the patient is  awake, alert, and in no apparent distress.  Head: normocephalic and atraumatic, sclera are clear  Neck: supple without meningismus  Chest:scattered rhonchi, no respiratory distress  Heart: regular rate and rhythm borderline tachy  ABD soft, nontender, nondistended, no peritoneal signs  Back nt in the midline  Extremities: warm and well perfused    No obvious edema/calf tenderness -- limited by obesity  Skin: warm and dry  Psych conversant  Neuro: awake, alert, moving all extremities    ED Course   Procedures  Labs Reviewed   CBC W/ AUTO DIFFERENTIAL - Abnormal; Notable for the following components:       Result Value    Hemoglobin 11.7 (*)     MCHC 30.9 (*)     RDW 17.3 (*)     Platelets 138 (*)     Lymph # 0.7 (*)     Gran % 76.7 (*)     Lymph % 11.4 (*)     All other components within normal limits   COMPREHENSIVE METABOLIC PANEL - Abnormal; Notable for the following components:    Glucose 111 (*)     Calcium 8.6 (*)     All other components within normal limits   D DIMER, QUANTITATIVE - Abnormal; Notable for the following components:    D-Dimer 0.99 (*)     All other components within normal limits   ISTAT PROCEDURE - Abnormal; Notable for the following components:    POC PH 7.331 (*)     POC PCO2 48.4 (*)     POC PO2 56 (*)     All other components within normal limits   INFLUENZA A & B BY MOLECULAR   TROPONIN I   SARS-COV-2 RNA AMPLIFICATION, QUAL    Narrative:     Is the patient symptomatic?->Yes        ECG Results              EKG 12-lead (Final result)  Result time 12/27/23 11:14:24      Final result by Interface, Lab In [facility] (12/27/23 11:14:24)                   Narrative:    Test Reason : R06.02,    Vent. Rate : 094 BPM     Atrial Rate : 094 BPM     P-R Int : 166 ms          QRS Dur : 088 ms      QT Int : 356 ms       P-R-T Axes : 021 038 053 degrees     QTc Int : 445 ms    Normal sinus rhythm with sinus arrhythmia  Normal ECG  When compared with ECG of 23-SEP-2022 11:25,  Premature atrial complexes are  no longer Present  Confirmed by Mark Smiley MD, Sourav ([id number]) on 12/27/2023 11:14:15 AM    Referred By: [email or web address]   SELF           Confirmed By:Sourav Smiley,                                  Imaging Results              CTA Chest Non-Coronary (PE Studies) (Final result)  Result time 12/26/23 22:57:06      Final result by Amie Bonds MD (12/26/23 22:57:06)                   Impression:      No pulmonary embolus seen      Electronically signed by: Amie Bonds  Date:    12/26/2023  Time:    22:57               Narrative:    EXAMINATION:  CTA CHEST NON CORONARY (PE STUDIES)    CLINICAL HISTORY:  Pulmonary embolism (PE) suspected, positive D-dimer;    TECHNIQUE:  Angiographic technique PE protocol with MIPS and post processing volumetric    Iterative technique with low-dose parameters for diminishing radiation dose as reasonably achievable    COMPARISON:  Radiographic correlation    FINDINGS:  No pulmonary embolus seen.  Main pulmonary artery caliber prominent raising possibility of pulmonary hypertension.    Aorta ectatic    No pleural effusion    Multifocal pulmonary airspace opacity or atelectasis bilateral as visualized with artifact    Shotty lymph nodes present                                       X-Ray Chest AP Portable (Final result)  Result time 12/26/23 21:36:15      Final result by Amie Bonds MD (12/26/23 21:36:15)                   Impression:      Under inflation      Electronically signed by: Amie Bonds  Date:    12/26/2023  Time:    21:36               Narrative:    EXAMINATION:  XR CHEST AP PORTABLE    CLINICAL HISTORY:  sob;    TECHNIQUE:  Single frontal portable view of the chest was performed.    COMPARISON:  09/23/2022    FINDINGS:  Lungs are less well expanded without sizable consolidation as visualized portable technique.                                       Medications   sodium chloride 0.9% flush 3 mL (has no administration in time range)   predniSONE  tablet 40 mg (40 mg Oral Given 12/27/23 0924)   albuterol-ipratropium 2.5 mg-0.5 mg/3 mL nebulizer solution 3 mL ( Nebulization Canceled Entry 12/27/23 1400)   ondansetron injection 4 mg (4 mg Intravenous Given 12/27/23 1318)   polyethylene glycol packet 17 g (17 g Oral Given 12/27/23 0925)   acetaminophen tablet 650 mg (650 mg Oral Given 12/27/23 0924)   benzonatate capsule 100 mg (100 mg Oral Given 12/27/23 1605)   cetirizine tablet 10 mg (10 mg Oral Given 12/27/23 0924)   sucralfate tablet 1 g (1 g Oral Given 12/27/23 1640)   sertraline tablet 200 mg (200 mg Oral Given 12/27/23 0924)   rOPINIRole tablet 0.5 mg (0.5 mg Oral Given 12/27/23 0204)   pantoprazole EC tablet 40 mg (40 mg Oral Given 12/27/23 0924)   ferrous sulfate tablet 1 each (1 each Oral Given 12/27/23 0924)   fluticasone propionate 50 mcg/actuation nasal spray 50 mcg (50 mcg Each Nostril Given 12/27/23 0924)   folic acid tablet 1 mg (1 mg Oral Given 12/27/23 0924)   gabapentin capsule 100 mg (100 mg Oral Given 12/27/23 1605)   lisinopriL tablet 20 mg (20 mg Oral Given 12/27/23 0924)     And   hydroCHLOROthiazide tablet 12.5 mg (12.5 mg Oral Given 12/27/23 0924)   influenza (QUADRIVALENT PF) vaccine 0.5 mL (has no administration in time range)   albuterol-ipratropium 2.5 mg-0.5 mg/3 mL nebulizer solution 3 mL (0 mLs Nebulization Hold 12/27/23 0215)   fluticasone furoate-vilanteroL 100-25 mcg/dose diskus inhaler 1 puff (1 puff Inhalation Given 12/27/23 0827)   butalbital-acetaminophen-caffeine -40 mg per tablet 1 tablet (1 tablet Oral Given 12/27/23 1607)   benzonatate capsule 200 mg (200 mg Oral Given 12/26/23 2039)   albuterol inhaler 2 puff (2 puffs Inhalation Given 12/26/23 2052)   ketorolac injection 15 mg (15 mg Intravenous Given 12/26/23 2139)   albuterol sulfate nebulizer solution 2.5 mg (2.5 mg Nebulization Given 12/26/23 2143)   iohexoL (OMNIPAQUE 350) injection 120 mL (120 mLs Intravenous Given 12/26/23 2242)   dexAMETHasone injection  8 mg (8 mg Intravenous Given 12/26/23 2327)   albuterol sulfate nebulizer solution 2.5 mg (2.5 mg Nebulization Given 12/26/23 2307)   azithromycin tablet 500 mg (500 mg Oral Given 12/26/23 2328)   acetaminophen tablet 1,000 mg (1,000 mg Oral Given 12/27/23 0021)   magnesium sulfate 2g in water 50mL IVPB (premix) (0 g Intravenous Stopped 12/27/23 1319)     Medical Decision Making  Amount and/or Complexity of Data Reviewed  Labs: ordered.  Radiology: ordered.    Risk  Prescription drug management.       Medical Decision Making:    This is an emergent evaluation of a patient presenting to the ED.  Nursing notes were reviewed.  I personally reviewed, read, and interpreted the ECG and any monitoring strips.  ECG: normal EKG, normal sinus rhythm, unchanged from previous tracings Compared with prior if available.  Read and interpreted by me independently.      I reviewed radiology images personally along with interpretations.  CXR nad  CT PE no PE  I personally reviewed and interpreted the laboratory results.  Elev ddimer  Gas w/o sig CO2 retention  Trop ok  Communicated with another physician regarding patient's care: hospitalist, discussed need for observation given no sig progress with treatment    I decided to obtain and review old medical records, which showed: h/o COPD, smoking    Critical Care Time    Critical care time was provided for 30 minutes exclusive of other billable procedures and teaching time for the treatment of  COPD exacerbation, resp failure   where the potential for death, shock, or further decline was possible.  Critical care time can include documentation, discussion with consultants, developing a care plan, as well as direct patient care.    Dallas Wong MD    The patient was encouraged and counseled to quit smoking and use of tobacco products, including the effect on their health.  Smoking cessation resources were provided.    The patient was encouraged strongly to get the COVID-19 vaccine  either after asymptomatic (if COVID positive) or offered it here in the ED is COVID negative.  The patient was also encouraged to obtain an influenza vaccination if available once asymptomatic (if positive) or if testing negative in the ED.                                 Clinical Impression:  Final diagnoses:  [R06.02] SOB (shortness of breath)  [J44.1] COPD exacerbation (Primary)  [R09.02] Hypoxia  [F17.200] Smoking          ED Disposition Condition    Observation Stable             Bill Wong MD, BLANCHE, [facility]  Department of Emergency Medicine       Dallas Wong MD  12/27/23 5024

## 2023-12-27 NOTE — ED NOTES
NEURO: Pt is awake and alert x3, Pt follows commands and affect is appropriate. Pt is moving all extremities well and sensation is intact. Speech is clear.    RESPIRATORY: Respirations are labored. Use of accessory muscles noted. Pt placed on continuous pulse ox with O2 sats noted at  90% on room air.   Audible wheezes noted.     CARDIAC: Pt placed on cardiac monitor. Sinus rhythm noted. Rate is normal. No abnormal heart sounds noted. Radial and dorsalis pedis pulses are palpable and +2. No edema noted. Cap refill is < 3    GASTRO: Pt denies abdominal pain/tenderness. States bowel movements have been regular. Bowel sounds are present and normal.   Nausea no vomiting.     : Pt denies any pain or frequency with urination.    SKIN: Skin is warm, dry and intact. Skin color is appropriate for ethnicity. Normal skin turgor noted. Mucous membranes are moist.     MUSCULOSKELETAL: No abnormalities are noted.    Pt presents to ER with c/o cough, nasal congestion, sob, bodyaches, chills. States s/s have been ongoing for 5 days. Pt just came back from a car trip to [place].   Congested productive cough noted. Hx of emphysema.

## 2023-12-27 NOTE — SUBJECTIVE & OBJECTIVE
Past Medical History:   Diagnosis Date    Anxiety     COPD (chronic obstructive pulmonary disease)     GERD (gastroesophageal reflux disease)     History of stomach ulcers     Hypertension     Restless leg syndrome        Past Surgical History:   Procedure Laterality Date    gastric by pass      HERNIA REPAIR  1998    HYSTERECTOMY      OOPHORECTOMY Right        Review of patient's allergies indicates:   Allergen Reactions    Penicillins Hives       No current facility-administered medications on file prior to encounter.     Current Outpatient Medications on File Prior to Encounter   Medication Sig    furosemide (LASIX) 40 MG tablet Take 1 tablet (40 mg total) by mouth once daily.    gabapentin (NEURONTIN) 100 MG capsule Take 1 capsule (100 mg total) by mouth 3 (three) times daily.    lisinopriL-hydrochlorothiazide (PRINZIDE,ZESTORETIC) 20-12.5 mg per tablet Take 2 tablets by mouth once daily.    mesalamine (LIALDA) 1.2 gram TbEC Take 2.4 g by mouth.    RABEprazole (ACIPHEX) 20 mg tablet Take 20 mg by mouth once daily.    rOPINIRole (REQUIP) 0.5 MG tablet Take 1 tablet (0.5 mg total) by mouth every evening.    sertraline (ZOLOFT) 100 MG tablet Take 2 tablets (200 mg total) by mouth once daily.    acetaminophen (TYLENOL) 325 MG tablet Take 2 tablets (650 mg total) by mouth every 4 (four) hours as needed for Pain. (Patient not taking: Reported on 6/28/2023)    albuterol sulfate 2.5 mg/0.5 mL Nebu Take 2.5 mg by nebulization every 4 (four) hours as needed. Rescue (Patient not taking: Reported on 6/28/2023)    baclofen (LIORESAL) 10 MG tablet Take 1 tablet (10 mg total) by mouth nightly as needed (msucle pain).    benzonatate (TESSALON) 100 MG capsule Take 100 mg by mouth 3 (three) times daily.    cetirizine (ZYRTEC) 10 MG tablet Take 10 mg by mouth once daily.    cyanocobalamin, vitamin B-12, 1,000 mcg Subl Place 1,000 mcg under the tongue once daily.    ergocalciferol (ERGOCALCIFEROL) 50,000 unit Cap Vitamin D2 1,250  mcg (50,000 unit) capsule   Take 1 capsule every week by oral route.    ferrous sulfate (IRON) 325 mg (65 mg iron) Tab tablet Take 1 tablet (325 mg total) by mouth daily with breakfast.    fluticasone propionate (FLONASE) 50 mcg/actuation nasal spray 1 spray by Each Nostril route 2 (two) times daily.    fluticasone-umeclidin-vilanter (TRELEGY ELLIPTA) 100-62.5-25 mcg DsDv Inhale 1 puff into the lungs once daily.    folic acid (FOLVITE) 1 MG tablet Take 1 tablet (1 mg total) by mouth once daily.    guaiFENesin-codeine 100-10 mg/5 ml (TUSSI-ORGANIDIN NR)  mg/5 mL syrup Take 5 mLs by mouth every 8 (eight) hours as needed for Cough or Congestion. (Patient not taking: Reported on 6/28/2023)    hyoscyamine (ANASPAZ,LEVSIN) 0.125 mg Tab Take by mouth.    LINZESS 145 mcg Cap capsule Take 145 mcg by mouth once daily.    loratadine (CLARITIN) 10 mg tablet Take 1 tablet (10 mg total) by mouth once daily.    nicotine (NICODERM CQ) 21 mg/24 hr Place 1 patch onto the skin once daily. (Patient not taking: Reported on 6/28/2023)    ondansetron (ZOFRAN-ODT) 4 MG TbDL DISSOLVE ONE TABLET IN MOUTH EVERY SIX HOURS AS NEEDED FOR NAUSEA (Patient not taking: Reported on 6/28/2023)    sucralfate (CARAFATE) 1 gram tablet sucralfate 1 gram tablet     Family History       Problem Relation (Age of Onset)    COPD Father, Mother    Colon cancer Maternal Grandmother    Lung cancer Father    Multiple sclerosis Son          Tobacco Use    Smoking status: Every Day     Current packs/day: 1.00     Average packs/day: 1 pack/day for 52.0 years (52.0 ttl pk-yrs)     Types: Cigarettes     Start date: 1972    Smokeless tobacco: Never    Tobacco comments:     Enrolled in the [employer] on 7/1/21 ([employer] Member ID # [id number]). Ambulatory referral to Smoking Cessation program.   Substance and Sexual Activity    Alcohol use: Never    Drug use: No    Sexual activity: Yes     Partners: Male     Birth control/protection: None     Review of Systems    Constitutional:  Negative for appetite change and chills.   HENT:  Negative for ear discharge and ear pain.    Respiratory:  Positive for cough, shortness of breath and wheezing. Negative for choking.    Cardiovascular:  Negative for chest pain and leg swelling.   Gastrointestinal:  Negative for anal bleeding and blood in stool.   Endocrine: Negative for polydipsia and polyphagia.   Genitourinary:  Negative for flank pain and hematuria.   Musculoskeletal:  Negative for back pain and gait problem.   Skin:  Negative for pallor and rash.   Allergic/Immunologic: Negative for food allergies and immunocompromised state.   Neurological:  Negative for seizures and speech difficulty.   Hematological:  Negative for adenopathy. Does not bruise/bleed easily.   Psychiatric/Behavioral:  Negative for decreased concentration and dysphoric mood.      Objective:     Vital Signs (Most Recent):  Temp: 98.4 °F (36.9 °C) (12/27/23 0015)  Pulse: 88 (12/27/23 0224)  Resp: 20 (12/27/23 0224)  BP: 125/63 (12/27/23 0109)  SpO2: (!) 93 % (12/27/23 0224) Vital Signs (24h Range):  Temp:  [98.4 °F (36.9 °C)-100.1 °F (37.8 °C)] 98.4 °F (36.9 °C)  Pulse:  [] 88  Resp:  [20-51] 20  SpO2:  [90 %-98 %] 93 %  BP: (125-171)/() 125/63     Weight: (!) 159.3 kg (351 lb 3.1 oz)  Body mass index is 55 kg/m².     Physical Exam  Vitals reviewed.   Constitutional:       General: She is not in acute distress.     Appearance: She is not toxic-appearing.   HENT:      Right Ear: There is no impacted cerumen.      Left Ear: There is no impacted cerumen.      Nose: No congestion or rhinorrhea.      Mouth/Throat:      Pharynx: No oropharyngeal exudate or posterior oropharyngeal erythema.   Eyes:      General:         Right eye: No discharge.         Left eye: No discharge.   Cardiovascular:      Rate and Rhythm: Normal rate.      Heart sounds: No murmur heard.     No gallop.   Pulmonary:      Effort: Respiratory distress present.      Breath sounds:  "Wheezing present. No rales.      Comments: On 2L of oxygen  Abdominal:      General: There is no distension.      Tenderness: There is no abdominal tenderness.   Musculoskeletal:         General: No swelling or deformity.      Cervical back: No rigidity.   Lymphadenopathy:      Cervical: No cervical adenopathy.   Skin:     Coloration: Skin is not jaundiced.      Findings: No bruising.   Neurological:      General: No focal deficit present.      Cranial Nerves: No cranial nerve deficit.      Motor: No weakness.   Psychiatric:         Mood and Affect: Mood normal.                Significant Labs: All pertinent labs within the past 24 hours have been reviewed.  BMP:   Recent Labs   Lab 12/26/23 2047   *      K 3.5      CO2 25   BUN 10   CREATININE 0.86   CALCIUM 8.6*     CBC:   Recent Labs   Lab 12/26/23 2047 12/27/23  0342   WBC 6.07 7.26   HGB 11.7* 11.4*   HCT 37.9 36.4*   * 132*     CMP:   Recent Labs   Lab 12/26/23 2047      K 3.5      CO2 25   *   BUN 10   CREATININE 0.86   CALCIUM 8.6*   PROT 6.5   ALBUMIN 3.5   BILITOT 0.4   ALKPHOS 86   AST 19   ALT 17   ANIONGAP 9     Cardiac Markers: No results for input(s): "CKMB", "MYOGLOBIN", "BNP", "TROPISTAT" in the last 48 hours.  Lactic Acid: No results for input(s): "LACTATE" in the last 48 hours.  Lipase: No results for input(s): "LIPASE" in the last 48 hours.  Lipid Panel: No results for input(s): "CHOL", "HDL", "LDLCALC", "TRIG", "CHOLHDL" in the last 48 hours.  TSH:   Recent Labs   Lab 06/30/23  1117   TSH 2.410     Urine Culture: No results for input(s): "LABURIN" in the last 48 hours.    Significant Imaging: I have reviewed all pertinent imaging results/findings within the past 24 hours.  I have reviewed and interpreted all pertinent imaging results/findings within the past 24 hours.  "

## 2023-12-27 NOTE — HPI
Umu Clayton is a 63 yr old female with PMH of COPD, hypertension, anxiety, restless legs syndrome who presents with cough, headache, body aches.    Past few days she has had worsening body aches, headache, productive cough, wheezing/shortness of breath with the associated symptoms of hot and cold.  She reports recent sick contacts.  She presents for further evaluation and care.    Triage vitals were significant for tachycardia, tachypnea, hypoxia 90%.  CBC was remarkable normocytic anemia.  CMP was remarkable for slightly elevated glucose and slightly lower calcium levels.    Troponin was negative.    CTA was unremarkable for PE.  Chest x-ray was underinflated but no obvious consolidation.  Influezna was negative.    Patient admitted for possible COPD exacerbation

## 2023-12-27 NOTE — PLAN OF CARE
12/27/23 0211   Admission   Initial VN Admission Questions Complete   Communication Issues? None   Shift   Virtual Nurse - Rounding Complete   Pain Management Interventions quiet environment facilitated;relaxation techniques promoted   Virtual Nurse - Patient Verbalized Approval Of VN Rounding;Camera Use   Type of Frequent Check   Type Telemetry Monitoring;Patient Rounds   Safety/Activity   Patient Rounds bed in low position;bed wheels locked;call light in patient/parent reach;clutter free environment maintained;ID band on;visualized patient;placement of personal items at bedside   Safety Promotion/Fall Prevention assistive device/personal item within reach;bed alarm set;Fall Risk reviewed with patient/family;nonskid shoes/socks when out of bed;side rails raised x 2;instructed to call staff for mobility;room near unit station;medications reviewed   Safety Precautions emergency equipment at bedside   Safety Bands on Patient Allergy Band;Fall Risk Band   Activity Management Ambulated -L4   Activity Assistance Provided assistance, stand-by   Positioning   Body Position position changed independently   Head of Bed (HOB) Positioning HOB elevated   Positioning/Transfer Devices pillows;in use      VN cued into room to complete admit assessment. VIP model introduced; VN working alongside bedside treatment team.  Plan of care reviewed with patient. Patient informed of fall risk, fall precautions, call light within reach, side rails x2 elevated. Patient notified to ask staff for assistance. Patient verbalized complete understanding. Time allowed for questions. Will continue to monitor and intervene as needed.

## 2023-12-27 NOTE — ASSESSMENT & PLAN NOTE
Patient's COPD is with exacerbation noted by continued dyspnea and use of accessory muscles for breathing currently.  Patient is currently on COPD Pathway. Continue scheduled inhalers steroids, oxygen, (antibiotics have been held) and monitor respiratory status closely.

## 2023-12-27 NOTE — PLAN OF CARE
CM met with pt at bedside to discuss discharge planning  Dx: COPD Exac  Pt is independent with ADL's. Pt has no DME or HH services. She lives at home with spouse.   Upon discharge, pts family member Pankaj Clayton(spouse) [phone]  will provide transport home  Pt made aware to contact CM with any questions or concerns. Cm will continue to follow and assist with any discharge needs.  Future Appointments   Date Time Provider Department Center   1/29/2024 10:30 AM Mike Mcdermott MD [facility]      12/27/23 1415   Discharge Assessment   Assessment Type Discharge Planning Assessment   Confirmed/corrected address, phone number and insurance Yes   Confirmed Demographics Correct on Facesheet   Source of Information patient   When was your last doctors appointment? 06/28/23   Does patient/caregiver understand observation status Yes   Communicated SANJANA with patient/caregiver Date not available/Unable to determine   Reason For Admission COPD Exac   People in Home spouse   Do you expect to return to your current living situation? Yes   Do you have help at home or someone to help you manage your care at home? Yes   Who are your caregiver(s) and their phone number(s)? Pankaj Clayton(spouse) [phone]   Prior to hospitilization cognitive status: Alert/Oriented   Current cognitive status: Alert/Oriented   Walking or Climbing Stairs Difficulty no   Dressing/Bathing Difficulty no   Equipment Currently Used at Home none   Readmission within 30 days? No   Patient currently being followed by outpatient case management? No   Do you currently have service(s) that help you manage your care at home? No   Do you take prescription medications? Yes   Do you have prescription coverage? Yes   Coverage [employer]   Do you have any problems affording any of your prescribed medications? No   Is the patient taking medications as prescribed? yes   Who is going to help you get home at discharge? Pankaj Clayton(spouse) [phone]   How do  you get to doctors appointments? family or friend will provide   Are you on dialysis? No   Do you take coumadin? No   Discharge Plan A Home;Home with family   Discharge Plan B Home;Home with family;Home Health   DME Needed Upon Discharge  none   Discharge Plan discussed with: Spouse/sig other;Patient   Name(s) and Number(s) Pankaj Clayton(spouse) [phone]   Transition of Care Barriers None   Physical Activity   On average, how many days per week do you engage in moderate to strenuous exercise (like a brisk walk)? 0 days   On average, how many minutes do you engage in exercise at this level? 0 min   Financial Resource Strain   How hard is it for you to pay for the very basics like food, housing, medical care, and heating? Not hard   Housing Stability   In the last 12 months, was there a time when you were not able to pay the mortgage or rent on time? N   In the last 12 months, how many places have you lived? 1   In the last 12 months, was there a time when you did not have a steady place to sleep or slept in a shelter (including now)? N   Transportation Needs   In the past 12 months, has lack of transportation kept you from medical appointments or from getting medications? no   In the past 12 months, has lack of transportation kept you from meetings, work, or from getting things needed for daily living? No   Food Insecurity   Within the past 12 months, you worried that your food would run out before you got the money to buy more. Never true   Within the past 12 months, the food you bought just didn't last and you didn't have money to get more. Never true   Stress   Do you feel stress - tense, restless, nervous, or anxious, or unable to sleep at night because your mind is troubled all the time - these days? Only a littl   Social Connections   In a typical week, how many times do you talk on the phone with family, friends, or neighbors? Twice a week   How often do you get together with friends or relatives?   (Once a  month)   How often do you attend Taoism or Restorationism services? Never   Do you belong to any clubs or organizations such as Taoism groups, unions, fraternal or athletic groups, or school groups? No   How often do you attend meetings of the clubs or organizations you belong to? Never   Are you , , , , never , or living with a partner?    Alcohol Use   Q1: How often do you have a drink containing alcohol? Never   Q2: How many drinks containing alcohol do you have on a typical day when you are drinking? None   Q3: How often do you have six or more drinks on one occasion? Never   OTHER   Name(s) of People in Home Pankaj Clayton(spouse) [phone]

## 2023-12-27 NOTE — CARE UPDATE
She was admitted to the hospital medicine service for further care.  We will continue management for COPD exacerbation.  Prednisone, nebs, supplemental O2 ordered.  Continue to monitor.    Anais Mathews NP

## 2023-12-27 NOTE — ASSESSMENT & PLAN NOTE
Body mass index is 55 kg/m². Morbid obesity complicates all aspects of disease management from diagnostic modalities to treatment. Weight loss encouraged and health benefits explained to patient.

## 2023-12-27 NOTE — ED NOTES
Called [facility] to set up transport to [facility]  Request STAT

## 2023-12-27 NOTE — ASSESSMENT & PLAN NOTE
Patient with Hypoxic Respiratory failure which is Acute.  she is not on home oxygen. Supplemental oxygen was provided and noted-      .   Signs/symptoms of respiratory failure include- tachypnea, increased work of breathing, respiratory distress, and wheezing. Contributing diagnoses includes - COPD Labs and images were reviewed. Patient Has not had a recent ABG. Will treat underlying causes and adjust management of respiratory failure as follows- continue with steroids and oxygen supplementation

## 2023-12-27 NOTE — ED NOTES
Attempted to call report,  states pt was just assigned can nurse writer call back in a few minutes,  given phone number and nurse writer name for receiving nurse to call report when she is ready.    made aware Agnes has been called.

## 2023-12-27 NOTE — H&P
[facility] Medicine  History & Physical    Patient Name: Umu Clayton  MRN: [id number]  Patient Class: OP- Observation  Admission Date: 12/26/2023  Attending Physician: Ann Marie Ray*   Primary Care Provider: Mike Mcdermott MD         Patient information was obtained from patient and ER records.     Subjective:     Principal Problem:COPD with acute exacerbation    Chief Complaint:   Chief Complaint   Patient presents with    Cough     Productive Cough (clear sputum), Headache, Body aches, and SOB. PT states she felt like she was getting sick last Wednesday. Unknown if fever is present patient reports getting hot and cold. Labored breathing on triage.         HPI: Umu Clayton is a 63 yr old female with PMH of COPD, hypertension, anxiety, restless legs syndrome who presents with cough, headache, body aches.    Past few days she has had worsening body aches, headache, productive cough, wheezing/shortness of breath with the associated symptoms of hot and cold.  She reports recent sick contacts.  She presents for further evaluation and care.    Triage vitals were significant for tachycardia, tachypnea, hypoxia 90%.  CBC was remarkable normocytic anemia.  CMP was remarkable for slightly elevated glucose and slightly lower calcium levels.    Troponin was negative.    CTA was unremarkable for PE.  Chest x-ray was underinflated but no obvious consolidation.  Influezna was negative.    Patient admitted for possible COPD exacerbation    Past Medical History:   Diagnosis Date    Anxiety     COPD (chronic obstructive pulmonary disease)     GERD (gastroesophageal reflux disease)     History of stomach ulcers     Hypertension     Restless leg syndrome        Past Surgical History:   Procedure Laterality Date    gastric by pass      HERNIA REPAIR  1998    HYSTERECTOMY      OOPHORECTOMY Right        Review of patient's allergies indicates:   Allergen Reactions    Penicillins Hives       No current  facility-administered medications on file prior to encounter.     Current Outpatient Medications on File Prior to Encounter   Medication Sig    furosemide (LASIX) 40 MG tablet Take 1 tablet (40 mg total) by mouth once daily.    gabapentin (NEURONTIN) 100 MG capsule Take 1 capsule (100 mg total) by mouth 3 (three) times daily.    lisinopriL-hydrochlorothiazide (PRINZIDE,ZESTORETIC) 20-12.5 mg per tablet Take 2 tablets by mouth once daily.    mesalamine (LIALDA) 1.2 gram TbEC Take 2.4 g by mouth.    RABEprazole (ACIPHEX) 20 mg tablet Take 20 mg by mouth once daily.    rOPINIRole (REQUIP) 0.5 MG tablet Take 1 tablet (0.5 mg total) by mouth every evening.    sertraline (ZOLOFT) 100 MG tablet Take 2 tablets (200 mg total) by mouth once daily.    acetaminophen (TYLENOL) 325 MG tablet Take 2 tablets (650 mg total) by mouth every 4 (four) hours as needed for Pain. (Patient not taking: Reported on 6/28/2023)    albuterol sulfate 2.5 mg/0.5 mL Nebu Take 2.5 mg by nebulization every 4 (four) hours as needed. Rescue (Patient not taking: Reported on 6/28/2023)    baclofen (LIORESAL) 10 MG tablet Take 1 tablet (10 mg total) by mouth nightly as needed (msucle pain).    benzonatate (TESSALON) 100 MG capsule Take 100 mg by mouth 3 (three) times daily.    cetirizine (ZYRTEC) 10 MG tablet Take 10 mg by mouth once daily.    cyanocobalamin, vitamin B-12, 1,000 mcg Subl Place 1,000 mcg under the tongue once daily.    ergocalciferol (ERGOCALCIFEROL) 50,000 unit Cap Vitamin D2 1,250 mcg (50,000 unit) capsule   Take 1 capsule every week by oral route.    ferrous sulfate (IRON) 325 mg (65 mg iron) Tab tablet Take 1 tablet (325 mg total) by mouth daily with breakfast.    fluticasone propionate (FLONASE) 50 mcg/actuation nasal spray 1 spray by Each Nostril route 2 (two) times daily.    fluticasone-umeclidin-vilanter (TRELEGY ELLIPTA) 100-62.5-25 mcg DsDv Inhale 1 puff into the lungs once daily.    folic acid (FOLVITE) 1 MG tablet Take 1 tablet  (1 mg total) by mouth once daily.    guaiFENesin-codeine 100-10 mg/5 ml (TUSSI-ORGANIDIN NR)  mg/5 mL syrup Take 5 mLs by mouth every 8 (eight) hours as needed for Cough or Congestion. (Patient not taking: Reported on 6/28/2023)    hyoscyamine (ANASPAZ,LEVSIN) 0.125 mg Tab Take by mouth.    LINZESS 145 mcg Cap capsule Take 145 mcg by mouth once daily.    loratadine (CLARITIN) 10 mg tablet Take 1 tablet (10 mg total) by mouth once daily.    nicotine (NICODERM CQ) 21 mg/24 hr Place 1 patch onto the skin once daily. (Patient not taking: Reported on 6/28/2023)    ondansetron (ZOFRAN-ODT) 4 MG TbDL DISSOLVE ONE TABLET IN MOUTH EVERY SIX HOURS AS NEEDED FOR NAUSEA (Patient not taking: Reported on 6/28/2023)    sucralfate (CARAFATE) 1 gram tablet sucralfate 1 gram tablet     Family History       Problem Relation (Age of Onset)    COPD Father, Mother    Colon cancer Maternal Grandmother    Lung cancer Father    Multiple sclerosis Son          Tobacco Use    Smoking status: Every Day     Current packs/day: 1.00     Average packs/day: 1 pack/day for 52.0 years (52.0 ttl pk-yrs)     Types: Cigarettes     Start date: 1972    Smokeless tobacco: Never    Tobacco comments:     Enrolled in the [employer] on 7/1/21 ([employer] Member ID # [id number]). Ambulatory referral to Smoking Cessation program.   Substance and Sexual Activity    Alcohol use: Never    Drug use: No    Sexual activity: Yes     Partners: Male     Birth control/protection: None     Review of Systems   Constitutional:  Negative for appetite change and chills.   HENT:  Negative for ear discharge and ear pain.    Respiratory:  Positive for cough, shortness of breath and wheezing. Negative for choking.    Cardiovascular:  Negative for chest pain and leg swelling.   Gastrointestinal:  Negative for anal bleeding and blood in stool.   Endocrine: Negative for polydipsia and polyphagia.   Genitourinary:  Negative for flank pain and hematuria.   Musculoskeletal:  Negative  for back pain and gait problem.   Skin:  Negative for pallor and rash.   Allergic/Immunologic: Negative for food allergies and immunocompromised state.   Neurological:  Negative for seizures and speech difficulty.   Hematological:  Negative for adenopathy. Does not bruise/bleed easily.   Psychiatric/Behavioral:  Negative for decreased concentration and dysphoric mood.      Objective:     Vital Signs (Most Recent):  Temp: 98.4 °F (36.9 °C) (12/27/23 0015)  Pulse: 88 (12/27/23 0224)  Resp: 20 (12/27/23 0224)  BP: 125/63 (12/27/23 0109)  SpO2: (!) 93 % (12/27/23 0224) Vital Signs (24h Range):  Temp:  [98.4 °F (36.9 °C)-100.1 °F (37.8 °C)] 98.4 °F (36.9 °C)  Pulse:  [] 88  Resp:  [20-51] 20  SpO2:  [90 %-98 %] 93 %  BP: (125-171)/() 125/63     Weight: (!) 159.3 kg (351 lb 3.1 oz)  Body mass index is 55 kg/m².     Physical Exam  Vitals reviewed.   Constitutional:       General: She is not in acute distress.     Appearance: She is not toxic-appearing.   HENT:      Right Ear: There is no impacted cerumen.      Left Ear: There is no impacted cerumen.      Nose: No congestion or rhinorrhea.      Mouth/Throat:      Pharynx: No oropharyngeal exudate or posterior oropharyngeal erythema.   Eyes:      General:         Right eye: No discharge.         Left eye: No discharge.   Cardiovascular:      Rate and Rhythm: Normal rate.      Heart sounds: No murmur heard.     No gallop.   Pulmonary:      Effort: Respiratory distress present.      Breath sounds: Wheezing present. No rales.      Comments: On 2L of oxygen  Abdominal:      General: There is no distension.      Tenderness: There is no abdominal tenderness.   Musculoskeletal:         General: No swelling or deformity.      Cervical back: No rigidity.   Lymphadenopathy:      Cervical: No cervical adenopathy.   Skin:     Coloration: Skin is not jaundiced.      Findings: No bruising.   Neurological:      General: No focal deficit present.      Cranial Nerves: No cranial  "nerve deficit.      Motor: No weakness.   Psychiatric:         Mood and Affect: Mood normal.                Significant Labs: All pertinent labs within the past 24 hours have been reviewed.  BMP:   Recent Labs   Lab 12/26/23 2047   *      K 3.5      CO2 25   BUN 10   CREATININE 0.86   CALCIUM 8.6*     CBC:   Recent Labs   Lab 12/26/23 2047 12/27/23  0342   WBC 6.07 7.26   HGB 11.7* 11.4*   HCT 37.9 36.4*   * 132*     CMP:   Recent Labs   Lab 12/26/23 2047      K 3.5      CO2 25   *   BUN 10   CREATININE 0.86   CALCIUM 8.6*   PROT 6.5   ALBUMIN 3.5   BILITOT 0.4   ALKPHOS 86   AST 19   ALT 17   ANIONGAP 9     Cardiac Markers: No results for input(s): "CKMB", "MYOGLOBIN", "BNP", "TROPISTAT" in the last 48 hours.  Lactic Acid: No results for input(s): "LACTATE" in the last 48 hours.  Lipase: No results for input(s): "LIPASE" in the last 48 hours.  Lipid Panel: No results for input(s): "CHOL", "HDL", "LDLCALC", "TRIG", "CHOLHDL" in the last 48 hours.  TSH:   Recent Labs   Lab 06/30/23  1117   TSH 2.410     Urine Culture: No results for input(s): "LABURIN" in the last 48 hours.    Significant Imaging: I have reviewed all pertinent imaging results/findings within the past 24 hours.  I have reviewed and interpreted all pertinent imaging results/findings within the past 24 hours.  Assessment/Plan:     * COPD with acute exacerbation  Patient's COPD is with exacerbation noted by continued dyspnea and use of accessory muscles for breathing currently.  Patient is currently on COPD Pathway. Continue scheduled inhalers steroids, oxygen, (antibiotics have been held) and monitor respiratory status closely.     Restless leg syndrome  We can continue home meds      Acute on chronic respiratory failure with hypoxia  Patient with Hypoxic Respiratory failure which is Acute.  she is not on home oxygen. Supplemental oxygen was provided and noted-      .   Signs/symptoms of respiratory " failure include- tachypnea, increased work of breathing, respiratory distress, and wheezing. Contributing diagnoses includes - COPD Labs and images were reviewed. Patient Has not had a recent ABG. Will treat underlying causes and adjust management of respiratory failure as follows- continue with steroids and oxygen supplementation    Severe obesity (BMI >= 40)  Body mass index is 55 kg/m². Morbid obesity complicates all aspects of disease management from diagnostic modalities to treatment. Weight loss encouraged and health benefits explained to patient.         Smoker  Patient current smoker.  Had a discussion about attempting to stop.  We will continue to encourage smoking cessation and offer nicotine patches      Hypertension  Chronic, controlled. Latest blood pressure and vitals reviewed-     Temp:  [98.4 °F (36.9 °C)-100.1 °F (37.8 °C)]   Pulse:  []   Resp:  [20-51]   BP: (125-171)/()   SpO2:  [90 %-98 %] .   Home meds for hypertension were reviewed and noted below.   Hypertension Medications               furosemide (LASIX) 40 MG tablet Take 1 tablet (40 mg total) by mouth once daily.    lisinopriL-hydrochlorothiazide (PRINZIDE,ZESTORETIC) 20-12.5 mg per tablet Take 2 tablets by mouth once daily.            While in the hospital, will manage blood pressure as follows; Continue home antihypertensive regimen    Will utilize p.r.n. blood pressure medication only if patient's blood pressure greater than 180/110 and she develops symptoms such as worsening chest pain or shortness of breath.    Anxiety  Continue home meds        VTE Risk Mitigation (From admission, onward)           Ordered     IP VTE HIGH RISK PATIENT  Once         12/27/23 0134     Place sequential compression device  Until discontinued         12/27/23 0134                       On 12/27/2023, patient should be placed in hospital observation services under my care.             Charlie Lewis MD  Department of Hospital  [facility]

## 2023-12-27 NOTE — ASSESSMENT & PLAN NOTE
Patient current smoker.  Had a discussion about attempting to stop.  We will continue to encourage smoking cessation and offer nicotine patches

## 2023-12-28 ENCOUNTER — CLINICAL SUPPORT (OUTPATIENT)
Dept: SMOKING CESSATION | Facility: CLINIC | Age: 63
End: 2023-12-28
Payer: COMMERCIAL

## 2023-12-28 DIAGNOSIS — F17.210 CIGARETTE SMOKER: Primary | ICD-10-CM

## 2023-12-28 PROBLEM — F17.200 TOBACCO DEPENDENCY: Status: ACTIVE | Noted: 2023-12-28

## 2023-12-28 LAB
ANION GAP SERPL CALC-SCNC: 9 MMOL/L (ref 8–16)
BASOPHILS # BLD AUTO: 0.02 K/UL (ref 0–0.2)
BASOPHILS NFR BLD: 0.3 % (ref 0–1.9)
BUN SERPL-MCNC: 16 MG/DL (ref 8–23)
CALCIUM SERPL-MCNC: 8.8 MG/DL (ref 8.7–10.5)
CHLORIDE SERPL-SCNC: 106 MMOL/L (ref 95–110)
CO2 SERPL-SCNC: 27 MMOL/L (ref 23–29)
CREAT SERPL-MCNC: 0.8 MG/DL (ref 0.5–1.4)
DIFFERENTIAL METHOD BLD: ABNORMAL
EOSINOPHIL # BLD AUTO: 0 K/UL (ref 0–0.5)
EOSINOPHIL NFR BLD: 0 % (ref 0–8)
ERYTHROCYTE [DISTWIDTH] IN BLOOD BY AUTOMATED COUNT: 17.8 % (ref 11.5–14.5)
EST. GFR  (NO RACE VARIABLE): >60 ML/MIN/1.73 M^2
GLUCOSE SERPL-MCNC: 111 MG/DL (ref 70–110)
HCT VFR BLD AUTO: 35.9 % (ref 37–48.5)
HGB BLD-MCNC: 11 G/DL (ref 12–16)
IMM GRANULOCYTES # BLD AUTO: 0.03 K/UL (ref 0–0.04)
IMM GRANULOCYTES NFR BLD AUTO: 0.4 % (ref 0–0.5)
LYMPHOCYTES # BLD AUTO: 0.8 K/UL (ref 1–4.8)
LYMPHOCYTES NFR BLD: 9.7 % (ref 18–48)
MAGNESIUM SERPL-MCNC: 2.6 MG/DL (ref 1.6–2.6)
MCH RBC QN AUTO: 26.7 PG (ref 27–31)
MCHC RBC AUTO-ENTMCNC: 30.6 G/DL (ref 32–36)
MCV RBC AUTO: 87 FL (ref 82–98)
MONOCYTES # BLD AUTO: 0.6 K/UL (ref 0.3–1)
MONOCYTES NFR BLD: 8.1 % (ref 4–15)
NEUTROPHILS # BLD AUTO: 6.3 K/UL (ref 1.8–7.7)
NEUTROPHILS NFR BLD: 81.5 % (ref 38–73)
NRBC BLD-RTO: 0 /100 WBC
PHOSPHATE SERPL-MCNC: 4.7 MG/DL (ref 2.7–4.5)
PLATELET # BLD AUTO: 133 K/UL (ref 150–450)
PMV BLD AUTO: 10.6 FL (ref 9.2–12.9)
POTASSIUM SERPL-SCNC: 4.4 MMOL/L (ref 3.5–5.1)
RBC # BLD AUTO: 4.12 M/UL (ref 4–5.4)
SODIUM SERPL-SCNC: 142 MMOL/L (ref 136–145)
WBC # BLD AUTO: 7.74 K/UL (ref 3.9–12.7)

## 2023-12-28 PROCEDURE — 27000221 HC OXYGEN, UP TO 24 HOURS

## 2023-12-28 PROCEDURE — 63600175 PHARM REV CODE 636 W HCPCS: Performed by: NURSE PRACTITIONER

## 2023-12-28 PROCEDURE — 99900035 HC TECH TIME PER 15 MIN (STAT)

## 2023-12-28 PROCEDURE — S4991 NICOTINE PATCH NONLEGEND: HCPCS | Performed by: NURSE PRACTITIONER

## 2023-12-28 PROCEDURE — 85025 COMPLETE CBC W/AUTO DIFF WBC: CPT | Performed by: STUDENT IN AN ORGANIZED HEALTH CARE EDUCATION/TRAINING PROGRAM

## 2023-12-28 PROCEDURE — 99407 PR TOBACCO USE CESSATION INTENSIVE >10 MINUTES: ICD-10-PCS | Mod: S$GLB,,,

## 2023-12-28 PROCEDURE — 63600175 PHARM REV CODE 636 W HCPCS: Performed by: STUDENT IN AN ORGANIZED HEALTH CARE EDUCATION/TRAINING PROGRAM

## 2023-12-28 PROCEDURE — 25000242 PHARM REV CODE 250 ALT 637 W/ HCPCS: Performed by: STUDENT IN AN ORGANIZED HEALTH CARE EDUCATION/TRAINING PROGRAM

## 2023-12-28 PROCEDURE — 25000003 PHARM REV CODE 250: Performed by: FAMILY MEDICINE

## 2023-12-28 PROCEDURE — 84100 ASSAY OF PHOSPHORUS: CPT | Performed by: STUDENT IN AN ORGANIZED HEALTH CARE EDUCATION/TRAINING PROGRAM

## 2023-12-28 PROCEDURE — 96375 TX/PRO/DX INJ NEW DRUG ADDON: CPT

## 2023-12-28 PROCEDURE — 36415 COLL VENOUS BLD VENIPUNCTURE: CPT | Performed by: STUDENT IN AN ORGANIZED HEALTH CARE EDUCATION/TRAINING PROGRAM

## 2023-12-28 PROCEDURE — 94640 AIRWAY INHALATION TREATMENT: CPT | Mod: XB

## 2023-12-28 PROCEDURE — 11000001 HC ACUTE MED/SURG PRIVATE ROOM

## 2023-12-28 PROCEDURE — 25000003 PHARM REV CODE 250: Performed by: INTERNAL MEDICINE

## 2023-12-28 PROCEDURE — 25000003 PHARM REV CODE 250: Performed by: NURSE PRACTITIONER

## 2023-12-28 PROCEDURE — 83735 ASSAY OF MAGNESIUM: CPT | Performed by: STUDENT IN AN ORGANIZED HEALTH CARE EDUCATION/TRAINING PROGRAM

## 2023-12-28 PROCEDURE — 96367 TX/PROPH/DG ADDL SEQ IV INF: CPT

## 2023-12-28 PROCEDURE — 94640 AIRWAY INHALATION TREATMENT: CPT

## 2023-12-28 PROCEDURE — 94761 N-INVAS EAR/PLS OXIMETRY MLT: CPT

## 2023-12-28 PROCEDURE — 99407 BEHAV CHNG SMOKING > 10 MIN: CPT | Mod: S$GLB,,,

## 2023-12-28 PROCEDURE — 80048 BASIC METABOLIC PNL TOTAL CA: CPT | Performed by: STUDENT IN AN ORGANIZED HEALTH CARE EDUCATION/TRAINING PROGRAM

## 2023-12-28 PROCEDURE — 25000003 PHARM REV CODE 250: Performed by: STUDENT IN AN ORGANIZED HEALTH CARE EDUCATION/TRAINING PROGRAM

## 2023-12-28 RX ORDER — MAGNESIUM SULFATE HEPTAHYDRATE 40 MG/ML
2 INJECTION, SOLUTION INTRAVENOUS ONCE
Status: DISCONTINUED | OUTPATIENT
Start: 2023-12-28 | End: 2023-12-28

## 2023-12-28 RX ORDER — GUAIFENESIN 100 MG/5ML
200 SOLUTION ORAL EVERY 4 HOURS PRN
Status: DISCONTINUED | OUTPATIENT
Start: 2023-12-28 | End: 2023-12-31 | Stop reason: HOSPADM

## 2023-12-28 RX ORDER — IBUPROFEN 200 MG
1 TABLET ORAL DAILY
Status: DISCONTINUED | OUTPATIENT
Start: 2023-12-28 | End: 2023-12-31 | Stop reason: HOSPADM

## 2023-12-28 RX ORDER — HYDROCODONE BITARTRATE AND ACETAMINOPHEN 5; 325 MG/1; MG/1
1 TABLET ORAL EVERY 8 HOURS PRN
Status: DISCONTINUED | OUTPATIENT
Start: 2023-12-28 | End: 2023-12-30

## 2023-12-28 RX ORDER — PHENTERMINE HYDROCHLORIDE 37.5 MG/1
37.5 CAPSULE ORAL DAILY
COMMUNITY
Start: 2023-12-19 | End: 2024-01-12

## 2023-12-28 RX ADMIN — HYDROCODONE BITARTRATE AND ACETAMINOPHEN 1 TABLET: 5; 325 TABLET ORAL at 09:12

## 2023-12-28 RX ADMIN — LISINOPRIL 20 MG: 20 TABLET ORAL at 09:12

## 2023-12-28 RX ADMIN — SUCRALFATE 1 G: 1 TABLET ORAL at 04:12

## 2023-12-28 RX ADMIN — METHYLPREDNISOLONE SODIUM SUCCINATE 80 MG: 40 INJECTION, POWDER, FOR SOLUTION INTRAMUSCULAR; INTRAVENOUS at 09:12

## 2023-12-28 RX ADMIN — FLUTICASONE PROPIONATE 50 MCG: 50 SPRAY, METERED NASAL at 09:12

## 2023-12-28 RX ADMIN — SUCRALFATE 1 G: 1 TABLET ORAL at 09:12

## 2023-12-28 RX ADMIN — IPRATROPIUM BROMIDE AND ALBUTEROL SULFATE 3 ML: 2.5; .5 SOLUTION RESPIRATORY (INHALATION) at 07:12

## 2023-12-28 RX ADMIN — IPRATROPIUM BROMIDE AND ALBUTEROL SULFATE 3 ML: 2.5; .5 SOLUTION RESPIRATORY (INHALATION) at 04:12

## 2023-12-28 RX ADMIN — BENZONATATE 100 MG: 100 CAPSULE ORAL at 09:12

## 2023-12-28 RX ADMIN — GABAPENTIN 100 MG: 100 CAPSULE ORAL at 02:12

## 2023-12-28 RX ADMIN — ONDANSETRON 4 MG: 2 INJECTION INTRAMUSCULAR; INTRAVENOUS at 11:12

## 2023-12-28 RX ADMIN — FERROUS SULFATE TAB 325 MG (65 MG ELEMENTAL FE) 1 EACH: 325 (65 FE) TAB at 09:12

## 2023-12-28 RX ADMIN — HYDROCHLOROTHIAZIDE 12.5 MG: 12.5 TABLET ORAL at 09:12

## 2023-12-28 RX ADMIN — POLYETHYLENE GLYCOL 3350 17 G: 17 POWDER, FOR SOLUTION ORAL at 09:12

## 2023-12-28 RX ADMIN — FLUTICASONE FUROATE AND VILANTEROL TRIFENATATE 1 PUFF: 100; 25 POWDER RESPIRATORY (INHALATION) at 11:12

## 2023-12-28 RX ADMIN — IPRATROPIUM BROMIDE AND ALBUTEROL SULFATE 3 ML: 2.5; .5 SOLUTION RESPIRATORY (INHALATION) at 08:12

## 2023-12-28 RX ADMIN — BENZONATATE 100 MG: 100 CAPSULE ORAL at 02:12

## 2023-12-28 RX ADMIN — SUCRALFATE 1 G: 1 TABLET ORAL at 06:12

## 2023-12-28 RX ADMIN — NICOTINE 1 PATCH: 21 PATCH, EXTENDED RELEASE TRANSDERMAL at 11:12

## 2023-12-28 RX ADMIN — FOLIC ACID 1 MG: 1 TABLET ORAL at 09:12

## 2023-12-28 RX ADMIN — GABAPENTIN 100 MG: 100 CAPSULE ORAL at 09:12

## 2023-12-28 RX ADMIN — HYDROCODONE BITARTRATE AND ACETAMINOPHEN 1 TABLET: 5; 325 TABLET ORAL at 11:12

## 2023-12-28 RX ADMIN — ROPINIROLE HYDROCHLORIDE 0.5 MG: 0.25 TABLET, FILM COATED ORAL at 09:12

## 2023-12-28 RX ADMIN — Medication 3 MG: at 09:12

## 2023-12-28 RX ADMIN — GUAIFENESIN 200 MG: 200 SOLUTION ORAL at 09:12

## 2023-12-28 RX ADMIN — METHYLPREDNISOLONE SODIUM SUCCINATE 80 MG: 40 INJECTION INTRAMUSCULAR; INTRAVENOUS at 09:12

## 2023-12-28 RX ADMIN — METHYLPREDNISOLONE SODIUM SUCCINATE 80 MG: 40 INJECTION, POWDER, FOR SOLUTION INTRAMUSCULAR; INTRAVENOUS at 02:12

## 2023-12-28 RX ADMIN — DOXYCYCLINE 100 MG: 100 INJECTION, POWDER, LYOPHILIZED, FOR SOLUTION INTRAVENOUS at 09:12

## 2023-12-28 RX ADMIN — IPRATROPIUM BROMIDE AND ALBUTEROL SULFATE 3 ML: 2.5; .5 SOLUTION RESPIRATORY (INHALATION) at 02:12

## 2023-12-28 RX ADMIN — IPRATROPIUM BROMIDE AND ALBUTEROL SULFATE 3 ML: 2.5; .5 SOLUTION RESPIRATORY (INHALATION) at 11:12

## 2023-12-28 RX ADMIN — SUCRALFATE 1 G: 1 TABLET ORAL at 11:12

## 2023-12-28 RX ADMIN — CETIRIZINE HYDROCHLORIDE 10 MG: 10 TABLET, FILM COATED ORAL at 09:12

## 2023-12-28 RX ADMIN — SERTRALINE HYDROCHLORIDE 200 MG: 50 TABLET ORAL at 09:12

## 2023-12-28 RX ADMIN — BUTALBITAL, ACETAMINOPHEN, AND CAFFEINE 1 TABLET: 50; 325; 40 TABLET ORAL at 03:12

## 2023-12-28 RX ADMIN — PANTOPRAZOLE SODIUM 40 MG: 40 TABLET, DELAYED RELEASE ORAL at 09:12

## 2023-12-28 NOTE — PROGRESS NOTES
[facility] Medicine  Progress Note    Patient Name: Umu Clayton  MRN: [id number]  Patient Class: IP- Inpatient   Admission Date: 12/26/2023  Length of Stay: 0 days  Attending Physician: Ann Marie Ray*  Primary Care Provider: Mike Mcdermott MD        Subjective:     Principal Problem:COPD with acute exacerbation        HPI:  Umu Clayton is a 63 yr old female with PMH of COPD, hypertension, anxiety, restless legs syndrome who presents with cough, headache, body aches.    Past few days she has had worsening body aches, headache, productive cough, wheezing/shortness of breath with the associated symptoms of hot and cold.  She reports recent sick contacts.  She presents for further evaluation and care.    Triage vitals were significant for tachycardia, tachypnea, hypoxia 90%.  CBC was remarkable normocytic anemia.  CMP was remarkable for slightly elevated glucose and slightly lower calcium levels.    Troponin was negative.    CTA was unremarkable for PE.  Chest x-ray was underinflated but no obvious consolidation.  Influezna was negative.    Patient admitted for possible COPD exacerbation    Overview/Hospital Course:   She was admitted to the hospital medicine service for further care.  Noted with a COPD exacerbation on admission.  Patient does not use O2.  Chest x-ray with no obvious consolidation.  She was influenza negative.  Negative PE on CTA.  She was started on nebs, steroids, and supplemental O2.  Notable wheezing noted on exam.  She was given Mag riders on yesterday, 12/27/2023, for wheezing.  Wheezing has not improved, she was switched from oral steroids to IV Solu-Medrol.  Because of poor clinical presentation, she was started doxycycline empirically.    Interval History:   Seen at the bedside.   She was noted to have wheezing is sounded coarse on lung auscultation.  IV steroids ordered.  Also added empiric antibiotics.    Review of Systems   Constitutional:  Negative for  activity change, appetite change, fatigue and fever.   HENT:  Negative for trouble swallowing.    Respiratory:  Positive for cough, chest tightness, shortness of breath and wheezing.    Cardiovascular:  Negative for chest pain.   Gastrointestinal:  Negative for diarrhea, nausea and vomiting.   Genitourinary:  Negative for difficulty urinating and hematuria.   Musculoskeletal:  Positive for myalgias. Negative for gait problem.   Skin:  Negative for wound.   Neurological:  Positive for weakness.   Hematological:  Bruises/bleeds easily.   Psychiatric/Behavioral:  Negative for confusion. The patient is not nervous/anxious.      Objective:     Vital Signs (Most Recent):  Temp: 97 °F (36.1 °C) (12/28/23 1134)  Pulse: 100 (12/28/23 1201)  Resp: 18 (12/28/23 1136)  BP: 122/69 (12/28/23 1134)  SpO2: (!) 94 % (12/28/23 1136) Vital Signs (24h Range):  Temp:  [97 °F (36.1 °C)-98.6 °F (37 °C)] 97 °F (36.1 °C)  Pulse:  [] 100  Resp:  [17-28] 18  SpO2:  [90 %-98 %] 94 %  BP: (116-129)/(64-80) 122/69     Weight: (!) 159.3 kg (351 lb 3.1 oz)  Body mass index is 55 kg/m².    Intake/Output Summary (Last 24 hours) at 12/28/2023 1426  Last data filed at 12/27/2023 2058  Gross per 24 hour   Intake 120 ml   Output --   Net 120 ml         Physical Exam  Vitals and nursing note reviewed.   Constitutional:       Appearance: She is obese. She is ill-appearing.   Musculoskeletal:      Cervical back: Normal range of motion and neck supple.      Right lower leg: No edema.      Left lower leg: No edema.   Neurological:      Mental Status: She is alert.             Significant Labs: All pertinent labs within the past 24 hours have been reviewed.    Significant Imaging: I have reviewed all pertinent imaging results/findings within the past 24 hours.    Assessment/Plan:      * COPD with acute exacerbation  Wheezing     Patient's COPD is with exacerbation noted by continued dyspnea and use of accessory muscles for breathing currently.  Patient  is currently on COPD Pathway. Continue scheduled inhalers steroids, oxygen, (antibiotics have been held) and monitor respiratory status closely.   -  Will add IV steroids as well as empiric antibiotics  - continue neb treatments  - continue supplemental O2    Tobacco dependency  Assistance with smoking cessation was offered, including:  [x]  Medications  []  Counseling  []  Printed Information on Smoking Cessation  [x]  Referral to a Smoking Cessation Program    Patient was counseled regarding smoking for 3-10 minutes.      Restless leg syndrome  We can continue home meds      Acute on chronic respiratory failure with hypoxia  Patient with Hypoxic Respiratory failure which is Acute.  she is not on home oxygen. Supplemental oxygen was provided and noted- Oxygen Concentration (%):  [32-36] 36    .   Signs/symptoms of respiratory failure include- tachypnea, increased work of breathing, respiratory distress, and wheezing. Contributing diagnoses includes - COPD Labs and images were reviewed. Patient Has not had a recent ABG. Will treat underlying causes and adjust management of respiratory failure as follows- continue with steroids and oxygen supplementation    Severe obesity (BMI >= 40)  Body mass index is 55 kg/m². Morbid obesity complicates all aspects of disease management from diagnostic modalities to treatment. Weight loss encouraged and health benefits explained to patient.         Smoker  Patient current smoker.  Had a discussion about attempting to stop.  We will continue to encourage smoking cessation and offer nicotine patches      Hypertension  Chronic, controlled. Latest blood pressure and vitals reviewed-     Temp:  [97 °F (36.1 °C)-98.6 °F (37 °C)]   Pulse:  []   Resp:  [17-28]   BP: (116-129)/(64-80)   SpO2:  [90 %-98 %] .   Home meds for hypertension were reviewed and noted below.   Hypertension Medications               furosemide (LASIX) 40 MG tablet Take 1 tablet (40 mg total) by mouth once  daily.    lisinopriL-hydrochlorothiazide (PRINZIDE,ZESTORETIC) 20-12.5 mg per tablet Take 2 tablets by mouth once daily.            While in the hospital, will manage blood pressure as follows; Continue home antihypertensive regimen    Will utilize p.r.n. blood pressure medication only if patient's blood pressure greater than 180/110 and she develops symptoms such as worsening chest pain or shortness of breath.    Anxiety  Continue home meds        VTE Risk Mitigation (From admission, onward)           Ordered     IP VTE HIGH RISK PATIENT  Once         12/27/23 0134     Place sequential compression device  Until discontinued         12/27/23 0134                    Discharge Planning   SANJANA:      Code Status: Full Code   Is the patient medically ready for discharge?:     Reason for patient still in hospital (select all that apply): Treatment, Imaging, Consult recommendations, and PT / OT recommendations  Discharge Plan A: Home, Home with family   Discharge Delays: None known at this time              Anais Mathews NP  Department of Hospital Medicine   [facility]

## 2023-12-28 NOTE — HOSPITAL COURSE
She was admitted to the hospital medicine service for further care.  Noted with a COPD exacerbation on admission.  Patient does not use O2.  Chest x-ray with no obvious consolidation.  She was influenza negative.  Negative PE on CTA.  She was started on nebs, steroids, and supplemental O2.  Notable wheezing noted on exam.  She was given Mag riders on yesterday, 12/27/2023, for wheezing.  Wheezing has not improved, she was switched from oral steroids to IV Solu-Medrol.  Because of poor clinical presentation, she was started doxycycline empirically.  Still noted with wheezing on exam.  In addition to doxycycline we will give azithromycin.  Will add Pulmicort to regimen.  CT chest--Patchy heterogeneous ground-glass airspace opacities with areas of focal reticulation most notably involving the right upper lobe.  Additional scattered centrilobular nodules, many of which demonstrate tree-in-bud distribution.  Findings are new from comparison CT 08/01/2023, and may represent evolving nonspecific infectious or non infectious inflammatory process.  Correlation is advised.  Consider short-term interval follow-up imaging to ensure resolution of these findings. Mosaic attenuation about the lung bases.  Findings can be seen in the setting of air trapping disease as well as elevated pulmonary pressures.Atelectasis involving the left lung base as well as scarring at the lingula. Mild centrilobular emphysematous change with upper lung zone predominance.   12/31- she is doing well this am. No wheezing noted. She will need DC with home O2 at 3 L NC on today. Will order an ambulatory referral to Pulm outpatient. HH was ordered. Also will need to follow up with PCP.

## 2023-12-28 NOTE — ASSESSMENT & PLAN NOTE
Assistance with smoking cessation was offered, including:  [x]  Medications  []  Counseling  []  Printed Information on Smoking Cessation  [x]  Referral to a Smoking Cessation Program    Patient was counseled regarding smoking for 3-10 minutes.

## 2023-12-28 NOTE — PLAN OF CARE
Pt not medically ready for discharge. 02 weaning in progress. CM will continue to follow pt throughout her hospital stay and provide any discharge needs.  Future Appointments   Date Time Provider Department Center   1/9/2024 10:30 AM Arnulfo Chavarria, [facility]   1/29/2024 10:30 AM Mike Mcdermott MD [facility]      12/28/23 1255   Post-Acute Status   Coverage [employer]   Discharge Delays None known at this time   Discharge Plan   Discharge Plan A Home;Home with family

## 2023-12-28 NOTE — ASSESSMENT & PLAN NOTE
Wheezing     Patient's COPD is with exacerbation noted by continued dyspnea and use of accessory muscles for breathing currently.  Patient is currently on COPD Pathway. Continue scheduled inhalers steroids, oxygen, (antibiotics have been held) and monitor respiratory status closely.   -  Will add IV steroids as well as empiric antibiotics  - continue neb treatments  - continue supplemental O2

## 2023-12-28 NOTE — PROGRESS NOTES
"[facility]           Pharmacy  Admission Medication Reconciliation     Based on information gathered for medication list, you may go to "Admission" then "Reconcile Home Medications" tabs to review and/or act upon those items.     The home medication list has been updated by the Pharmacy department.   Please read ALL comments highlighted in red.   Please address this information as you see fit.    Feel free to contact us if you have any questions or require assistance.    Home medication list has been compared to current inpatient medications. Please review the following discrepancies noted below:    Patient reports NO LONGER TAKING the following medication(s) which has been ordered upon admit.    Patient reports STILL TAKING the following medication(s) which was not ordered upon admit  Baclofen 10mg nightly  Linzess (nonformulary)  Lialda 2.4 gram daily (nonformulary_  Patient reports taking a DIFFERENT dose,route, or frequency than what is ordered upon admit  Aciphex (nonformulary) substituted with Pantoprazole  Feel free to contact us if you have any questions or require assistance.    Eliana Neff, PharmD  [phone]        "

## 2023-12-28 NOTE — PLAN OF CARE
Problem: Adult Inpatient Plan of Care  Goal: Plan of Care Review  Outcome: Ongoing, Progressing  Flowsheets (Taken 12/27/2023 2329)  Plan of Care Reviewed With: patient     Problem: Bariatric Environmental Safety  Goal: Safety Maintained with Care  Outcome: Ongoing, Progressing     Problem: Adjustment to Illness COPD (Chronic Obstructive Pulmonary Disease)  Goal: Optimal Chronic Illness Coping  Outcome: Ongoing, Progressing  Intervention: Support and Optimize Psychosocial Response  Flowsheets (Taken 12/27/2023 2333)  Supportive Measures:   active listening utilized   positive reinforcement provided  Family/Support System Care: self-care encouraged     Problem: ARDS (Acute Respiratory Distress Syndrome)  Goal: Effective Oxygenation  Outcome: Ongoing, Progressing  Intervention: Optimize Oxygenation, Ventilation and Perfusion  Flowsheets (Taken 12/27/2023 2333)  Airway/Ventilation Management:   airway patency maintained   pulmonary hygiene promoted   calming measures promoted  Stabilization Measures: respiratory support increased     Problem: Skin Injury Risk Increased  Goal: Skin Health and Integrity  Outcome: Ongoing, Progressing  Intervention: Optimize Skin Protection  Flowsheets (Taken 12/27/2023 2333)  Pressure Reduction Techniques: frequent weight shift encouraged  Skin Protection: adhesive use limited  Head of Bed (HOB) Positioning: HOB elevated   Plan of care progressing.

## 2023-12-28 NOTE — SUBJECTIVE & OBJECTIVE
Interval History:   Seen at the bedside.   She was noted to have wheezing is sounded coarse on lung auscultation.  IV steroids ordered.  Also added empiric antibiotics.    Review of Systems   Constitutional:  Negative for activity change, appetite change, fatigue and fever.   HENT:  Negative for trouble swallowing.    Respiratory:  Positive for cough, chest tightness, shortness of breath and wheezing.    Cardiovascular:  Negative for chest pain.   Gastrointestinal:  Negative for diarrhea, nausea and vomiting.   Genitourinary:  Negative for difficulty urinating and hematuria.   Musculoskeletal:  Positive for myalgias. Negative for gait problem.   Skin:  Negative for wound.   Neurological:  Positive for weakness.   Hematological:  Bruises/bleeds easily.   Psychiatric/Behavioral:  Negative for confusion. The patient is not nervous/anxious.      Objective:     Vital Signs (Most Recent):  Temp: 97 °F (36.1 °C) (12/28/23 1134)  Pulse: 100 (12/28/23 1201)  Resp: 18 (12/28/23 1136)  BP: 122/69 (12/28/23 1134)  SpO2: (!) 94 % (12/28/23 1136) Vital Signs (24h Range):  Temp:  [97 °F (36.1 °C)-98.6 °F (37 °C)] 97 °F (36.1 °C)  Pulse:  [] 100  Resp:  [17-28] 18  SpO2:  [90 %-98 %] 94 %  BP: (116-129)/(64-80) 122/69     Weight: (!) 159.3 kg (351 lb 3.1 oz)  Body mass index is 55 kg/m².    Intake/Output Summary (Last 24 hours) at 12/28/2023 1426  Last data filed at 12/27/2023 2058  Gross per 24 hour   Intake 120 ml   Output --   Net 120 ml         Physical Exam  Vitals and nursing note reviewed.   Constitutional:       Appearance: She is obese. She is ill-appearing.   Musculoskeletal:      Cervical back: Normal range of motion and neck supple.      Right lower leg: No edema.      Left lower leg: No edema.   Neurological:      Mental Status: She is alert.             Significant Labs: All pertinent labs within the past 24 hours have been reviewed.    Significant Imaging: I have reviewed all pertinent imaging results/findings  within the past 24 hours.

## 2023-12-28 NOTE — PLAN OF CARE
Patient on oxygen with documented flow. Will attempt to wean as tolerated. Patient tolerated aerosol treatment well.  No adverse reactions noted.

## 2023-12-28 NOTE — ASSESSMENT & PLAN NOTE
Chronic, controlled. Latest blood pressure and vitals reviewed-     Temp:  [97 °F (36.1 °C)-98.6 °F (37 °C)]   Pulse:  []   Resp:  [17-28]   BP: (116-129)/(64-80)   SpO2:  [90 %-98 %] .   Home meds for hypertension were reviewed and noted below.   Hypertension Medications               furosemide (LASIX) 40 MG tablet Take 1 tablet (40 mg total) by mouth once daily.    lisinopriL-hydrochlorothiazide (PRINZIDE,ZESTORETIC) 20-12.5 mg per tablet Take 2 tablets by mouth once daily.            While in the hospital, will manage blood pressure as follows; Continue home antihypertensive regimen    Will utilize p.r.n. blood pressure medication only if patient's blood pressure greater than 180/110 and she develops symptoms such as worsening chest pain or shortness of breath.

## 2023-12-28 NOTE — ASSESSMENT & PLAN NOTE
Patient with Hypoxic Respiratory failure which is Acute.  she is not on home oxygen. Supplemental oxygen was provided and noted- Oxygen Concentration (%):  [32-36] 36    .   Signs/symptoms of respiratory failure include- tachypnea, increased work of breathing, respiratory distress, and wheezing. Contributing diagnoses includes - COPD Labs and images were reviewed. Patient Has not had a recent ABG. Will treat underlying causes and adjust management of respiratory failure as follows- continue with steroids and oxygen supplementation

## 2023-12-28 NOTE — PHARMACY MED REC
"[facility]           Pharmacy  Admission Medication History     The home medication history was taken by Eliana Neff PharmD.      Medication history obtained from Medications listed below were obtained from: Patient/family and Analytic software- [employer]    Based on information gathered for medication list, you may go to "Admission" then "Reconcile Home Medications" tabs to review and/or act upon those items.     The home medication list has been updated by the Pharmacy department.   Please read ALL comments highlighted in yellow.   Please address this information as you see fit.    Feel free to contact us if you have any questions or require assistance.    The current inpatient medication list has been compared to the home medication list and the following discrepancies were noted:    Patient reports NOT TAKING the following medication(s):    Patient reports he/she IS TAKING the following which was not ordered upon admit  Baclofen 10mg nightly  Linzess (nonformulary)  Lialda (nonformulary)  Patient reports taking a DIFFERENT DRUG than that ordered upon admit  Aciphex (nonformulary) substituted with Pantoprazole  Patient reports taking a drug DIFFERENTLY than how ordered upon admit      Potential issues to be addressed PRIOR TO DISCHARGE      No current facility-administered medications on file prior to encounter.     Current Outpatient Medications on File Prior to Encounter   Medication Sig Dispense Refill    baclofen (LIORESAL) 10 MG tablet Take 1 tablet (10 mg total) by mouth nightly as needed (msucle pain). 90 tablet 5    benzonatate (TESSALON) 100 MG capsule Take 100 mg by mouth 3 (three) times daily.      cetirizine (ZYRTEC) 10 MG tablet Take 10 mg by mouth once daily.      cyanocobalamin, vitamin B-12, 1,000 mcg Subl Place 1,000 mcg under the tongue once daily. 90 tablet 3    ergocalciferol (ERGOCALCIFEROL) 50,000 unit Cap Vitamin D2 1,250 mcg (50,000 unit) capsule   Take 1 capsule every " week by oral route.      ferrous sulfate (IRON) 325 mg (65 mg iron) Tab tablet Take 1 tablet (325 mg total) by mouth daily with breakfast. 90 tablet 3    fluticasone propionate (FLONASE) 50 mcg/actuation nasal spray 1 spray by Each Nostril route 2 (two) times daily.      fluticasone-umeclidin-vilanter (TRELEGY ELLIPTA) 100-62.5-25 mcg DsDv Inhale 1 puff into the lungs once daily. 1 each 11    folic acid (FOLVITE) 1 MG tablet Take 1 tablet (1 mg total) by mouth once daily. 90 tablet 3    furosemide (LASIX) 40 MG tablet Take 1 tablet (40 mg total) by mouth once daily. 90 tablet 3    gabapentin (NEURONTIN) 100 MG capsule Take 1 capsule (100 mg total) by mouth 3 (three) times daily. 270 capsule 11    LINZESS 145 mcg Cap capsule Take 145 mcg by mouth once daily.      lisinopriL-hydrochlorothiazide (PRINZIDE,ZESTORETIC) 20-12.5 mg per tablet Take 2 tablets by mouth once daily. 180 tablet 11    mesalamine (LIALDA) 1.2 gram TbEC Take 2.4 g by mouth.      phentermine 37.5 MG capsule Take 37.5 mg by mouth once daily.      RABEprazole (ACIPHEX) 20 mg tablet Take 20 mg by mouth once daily.      rOPINIRole (REQUIP) 0.5 MG tablet Take 1 tablet (0.5 mg total) by mouth every evening. 90 tablet 3    sertraline (ZOLOFT) 100 MG tablet Take 2 tablets (200 mg total) by mouth once daily. 180 tablet 3    sucralfate (CARAFATE) 1 gram tablet sucralfate 1 gram tablet      acetaminophen (TYLENOL) 325 MG tablet Take 2 tablets (650 mg total) by mouth every 4 (four) hours as needed for Pain. (Patient not taking: Reported on 6/28/2023) 30 tablet 0    albuterol sulfate 2.5 mg/0.5 mL Nebu Take 2.5 mg by nebulization every 4 (four) hours as needed. Rescue (Patient not taking: Reported on 6/28/2023) 1 each 1    guaiFENesin-codeine 100-10 mg/5 ml (TUSSI-ORGANIDIN NR)  mg/5 mL syrup Take 5 mLs by mouth every 8 (eight) hours as needed for Cough or Congestion. (Patient not taking: Reported on 6/28/2023) 118 mL 0    loratadine (CLARITIN) 10 mg  tablet Take 1 tablet (10 mg total) by mouth once daily. 60 tablet 0    nicotine (NICODERM CQ) 21 mg/24 hr Place 1 patch onto the skin once daily. (Patient not taking: Reported on 6/28/2023) 30 patch 1    ondansetron (ZOFRAN-ODT) 4 MG TbDL DISSOLVE ONE TABLET IN MOUTH EVERY SIX HOURS AS NEEDED FOR NAUSEA (Patient not taking: Reported on 6/28/2023) 9 tablet 2    [DISCONTINUED] hyoscyamine (ANASPAZ,LEVSIN) 0.125 mg Tab Take by mouth.         Please address this information as you see fit.  Feel free to contact us if you have any questions or require assistance.    Eliana Neff, PharmD  [phone]                  .

## 2023-12-29 ENCOUNTER — CLINICAL SUPPORT (OUTPATIENT)
Dept: SMOKING CESSATION | Facility: CLINIC | Age: 63
End: 2023-12-29

## 2023-12-29 DIAGNOSIS — F17.210 CIGARETTE SMOKER: Primary | ICD-10-CM

## 2023-12-29 LAB
ANION GAP SERPL CALC-SCNC: 9 MMOL/L (ref 8–16)
BASOPHILS # BLD AUTO: 0.01 K/UL (ref 0–0.2)
BASOPHILS NFR BLD: 0.2 % (ref 0–1.9)
BUN SERPL-MCNC: 26 MG/DL (ref 8–23)
CALCIUM SERPL-MCNC: 9 MG/DL (ref 8.7–10.5)
CHLORIDE SERPL-SCNC: 103 MMOL/L (ref 95–110)
CO2 SERPL-SCNC: 26 MMOL/L (ref 23–29)
CREAT SERPL-MCNC: 0.9 MG/DL (ref 0.5–1.4)
DIFFERENTIAL METHOD BLD: ABNORMAL
EOSINOPHIL # BLD AUTO: 0 K/UL (ref 0–0.5)
EOSINOPHIL NFR BLD: 0 % (ref 0–8)
ERYTHROCYTE [DISTWIDTH] IN BLOOD BY AUTOMATED COUNT: 17.6 % (ref 11.5–14.5)
EST. GFR  (NO RACE VARIABLE): >60 ML/MIN/1.73 M^2
GLUCOSE SERPL-MCNC: 142 MG/DL (ref 70–110)
HCT VFR BLD AUTO: 35.2 % (ref 37–48.5)
HGB BLD-MCNC: 10.6 G/DL (ref 12–16)
IMM GRANULOCYTES # BLD AUTO: 0.06 K/UL (ref 0–0.04)
IMM GRANULOCYTES NFR BLD AUTO: 1.1 % (ref 0–0.5)
INFLUENZA A, MOLECULAR: NEGATIVE
INFLUENZA B, MOLECULAR: NEGATIVE
LYMPHOCYTES # BLD AUTO: 0.4 K/UL (ref 1–4.8)
LYMPHOCYTES NFR BLD: 7.1 % (ref 18–48)
MAGNESIUM SERPL-MCNC: 2.3 MG/DL (ref 1.6–2.6)
MCH RBC QN AUTO: 26.8 PG (ref 27–31)
MCHC RBC AUTO-ENTMCNC: 30.1 G/DL (ref 32–36)
MCV RBC AUTO: 89 FL (ref 82–98)
MONOCYTES # BLD AUTO: 0.2 K/UL (ref 0.3–1)
MONOCYTES NFR BLD: 3.1 % (ref 4–15)
NEUTROPHILS # BLD AUTO: 4.9 K/UL (ref 1.8–7.7)
NEUTROPHILS NFR BLD: 88.5 % (ref 38–73)
NRBC BLD-RTO: 0 /100 WBC
PHOSPHATE SERPL-MCNC: 4 MG/DL (ref 2.7–4.5)
PLATELET # BLD AUTO: 149 K/UL (ref 150–450)
PMV BLD AUTO: 11.5 FL (ref 9.2–12.9)
POTASSIUM SERPL-SCNC: 4.3 MMOL/L (ref 3.5–5.1)
RBC # BLD AUTO: 3.96 M/UL (ref 4–5.4)
SODIUM SERPL-SCNC: 138 MMOL/L (ref 136–145)
SPECIMEN SOURCE: NORMAL
WBC # BLD AUTO: 5.52 K/UL (ref 3.9–12.7)

## 2023-12-29 PROCEDURE — 25000242 PHARM REV CODE 250 ALT 637 W/ HCPCS: Performed by: NURSE PRACTITIONER

## 2023-12-29 PROCEDURE — 25000003 PHARM REV CODE 250: Performed by: INTERNAL MEDICINE

## 2023-12-29 PROCEDURE — 84100 ASSAY OF PHOSPHORUS: CPT | Performed by: STUDENT IN AN ORGANIZED HEALTH CARE EDUCATION/TRAINING PROGRAM

## 2023-12-29 PROCEDURE — 94640 AIRWAY INHALATION TREATMENT: CPT

## 2023-12-29 PROCEDURE — 94761 N-INVAS EAR/PLS OXIMETRY MLT: CPT

## 2023-12-29 PROCEDURE — 63600175 PHARM REV CODE 636 W HCPCS: Performed by: STUDENT IN AN ORGANIZED HEALTH CARE EDUCATION/TRAINING PROGRAM

## 2023-12-29 PROCEDURE — 85025 COMPLETE CBC W/AUTO DIFF WBC: CPT | Performed by: STUDENT IN AN ORGANIZED HEALTH CARE EDUCATION/TRAINING PROGRAM

## 2023-12-29 PROCEDURE — 99900035 HC TECH TIME PER 15 MIN (STAT)

## 2023-12-29 PROCEDURE — 99406 PT REFUSED TOBACCO CESSATION: ICD-10-PCS | Mod: S$GLB,,,

## 2023-12-29 PROCEDURE — 99406 BEHAV CHNG SMOKING 3-10 MIN: CPT | Mod: S$GLB,,,

## 2023-12-29 PROCEDURE — 36415 COLL VENOUS BLD VENIPUNCTURE: CPT | Performed by: STUDENT IN AN ORGANIZED HEALTH CARE EDUCATION/TRAINING PROGRAM

## 2023-12-29 PROCEDURE — 63600175 PHARM REV CODE 636 W HCPCS: Performed by: NURSE PRACTITIONER

## 2023-12-29 PROCEDURE — 80048 BASIC METABOLIC PNL TOTAL CA: CPT | Performed by: STUDENT IN AN ORGANIZED HEALTH CARE EDUCATION/TRAINING PROGRAM

## 2023-12-29 PROCEDURE — 83735 ASSAY OF MAGNESIUM: CPT | Performed by: STUDENT IN AN ORGANIZED HEALTH CARE EDUCATION/TRAINING PROGRAM

## 2023-12-29 PROCEDURE — 11000001 HC ACUTE MED/SURG PRIVATE ROOM

## 2023-12-29 PROCEDURE — 25000003 PHARM REV CODE 250: Performed by: NURSE PRACTITIONER

## 2023-12-29 PROCEDURE — S4991 NICOTINE PATCH NONLEGEND: HCPCS | Performed by: NURSE PRACTITIONER

## 2023-12-29 PROCEDURE — 25000003 PHARM REV CODE 250: Performed by: FAMILY MEDICINE

## 2023-12-29 PROCEDURE — 25000003 PHARM REV CODE 250: Performed by: STUDENT IN AN ORGANIZED HEALTH CARE EDUCATION/TRAINING PROGRAM

## 2023-12-29 PROCEDURE — 25000242 PHARM REV CODE 250 ALT 637 W/ HCPCS: Performed by: STUDENT IN AN ORGANIZED HEALTH CARE EDUCATION/TRAINING PROGRAM

## 2023-12-29 PROCEDURE — 27000221 HC OXYGEN, UP TO 24 HOURS

## 2023-12-29 PROCEDURE — 87502 INFLUENZA DNA AMP PROBE: CPT | Performed by: NURSE PRACTITIONER

## 2023-12-29 RX ORDER — IBUPROFEN 200 MG
1 TABLET ORAL DAILY
Status: DISCONTINUED | OUTPATIENT
Start: 2023-12-29 | End: 2023-12-31 | Stop reason: HOSPADM

## 2023-12-29 RX ORDER — DOXYCYCLINE HYCLATE 100 MG
100 TABLET ORAL EVERY 12 HOURS
Status: DISCONTINUED | OUTPATIENT
Start: 2023-12-29 | End: 2023-12-31 | Stop reason: HOSPADM

## 2023-12-29 RX ORDER — IPRATROPIUM BROMIDE AND ALBUTEROL SULFATE 2.5; .5 MG/3ML; MG/3ML
3 SOLUTION RESPIRATORY (INHALATION) EVERY 4 HOURS
Status: DISCONTINUED | OUTPATIENT
Start: 2023-12-29 | End: 2023-12-31 | Stop reason: HOSPADM

## 2023-12-29 RX ADMIN — IPRATROPIUM BROMIDE AND ALBUTEROL SULFATE 3 ML: 2.5; .5 SOLUTION RESPIRATORY (INHALATION) at 07:12

## 2023-12-29 RX ADMIN — CETIRIZINE HYDROCHLORIDE 10 MG: 10 TABLET, FILM COATED ORAL at 08:12

## 2023-12-29 RX ADMIN — NICOTINE 1 PATCH: 21 PATCH, EXTENDED RELEASE TRANSDERMAL at 12:12

## 2023-12-29 RX ADMIN — SUCRALFATE 1 G: 1 TABLET ORAL at 08:12

## 2023-12-29 RX ADMIN — METHYLPREDNISOLONE SODIUM SUCCINATE 80 MG: 40 INJECTION, POWDER, FOR SOLUTION INTRAMUSCULAR; INTRAVENOUS at 02:12

## 2023-12-29 RX ADMIN — HYDROCODONE BITARTRATE AND ACETAMINOPHEN 1 TABLET: 5; 325 TABLET ORAL at 09:12

## 2023-12-29 RX ADMIN — DOXYCYCLINE 100 MG: 100 INJECTION, POWDER, LYOPHILIZED, FOR SOLUTION INTRAVENOUS at 10:12

## 2023-12-29 RX ADMIN — SUCRALFATE 1 G: 1 TABLET ORAL at 04:12

## 2023-12-29 RX ADMIN — IPRATROPIUM BROMIDE AND ALBUTEROL SULFATE 3 ML: 2.5; .5 SOLUTION RESPIRATORY (INHALATION) at 04:12

## 2023-12-29 RX ADMIN — FERROUS SULFATE TAB 325 MG (65 MG ELEMENTAL FE) 1 EACH: 325 (65 FE) TAB at 08:12

## 2023-12-29 RX ADMIN — DOXYCYCLINE HYCLATE 100 MG: 100 TABLET, COATED ORAL at 08:12

## 2023-12-29 RX ADMIN — LISINOPRIL 20 MG: 20 TABLET ORAL at 08:12

## 2023-12-29 RX ADMIN — ONDANSETRON 4 MG: 2 INJECTION INTRAMUSCULAR; INTRAVENOUS at 09:12

## 2023-12-29 RX ADMIN — SUCRALFATE 1 G: 1 TABLET ORAL at 06:12

## 2023-12-29 RX ADMIN — BENZONATATE 100 MG: 100 CAPSULE ORAL at 08:12

## 2023-12-29 RX ADMIN — POLYETHYLENE GLYCOL 3350 17 G: 17 POWDER, FOR SOLUTION ORAL at 08:12

## 2023-12-29 RX ADMIN — FOLIC ACID 1 MG: 1 TABLET ORAL at 08:12

## 2023-12-29 RX ADMIN — NICOTINE 1 PATCH: 21 PATCH, EXTENDED RELEASE TRANSDERMAL at 08:12

## 2023-12-29 RX ADMIN — BENZONATATE 100 MG: 100 CAPSULE ORAL at 02:12

## 2023-12-29 RX ADMIN — SUCRALFATE 1 G: 1 TABLET ORAL at 10:12

## 2023-12-29 RX ADMIN — SERTRALINE HYDROCHLORIDE 200 MG: 50 TABLET ORAL at 08:12

## 2023-12-29 RX ADMIN — ROPINIROLE HYDROCHLORIDE 0.5 MG: 0.25 TABLET, FILM COATED ORAL at 08:12

## 2023-12-29 RX ADMIN — FLUTICASONE PROPIONATE 50 MCG: 50 SPRAY, METERED NASAL at 08:12

## 2023-12-29 RX ADMIN — GABAPENTIN 100 MG: 100 CAPSULE ORAL at 08:12

## 2023-12-29 RX ADMIN — FLUTICASONE FUROATE AND VILANTEROL TRIFENATATE 1 PUFF: 100; 25 POWDER RESPIRATORY (INHALATION) at 07:12

## 2023-12-29 RX ADMIN — METHYLPREDNISOLONE SODIUM SUCCINATE 80 MG: 40 INJECTION, POWDER, FOR SOLUTION INTRAMUSCULAR; INTRAVENOUS at 06:12

## 2023-12-29 RX ADMIN — METHYLPREDNISOLONE SODIUM SUCCINATE 80 MG: 40 INJECTION, POWDER, FOR SOLUTION INTRAMUSCULAR; INTRAVENOUS at 11:12

## 2023-12-29 RX ADMIN — Medication 3 MG: at 09:12

## 2023-12-29 RX ADMIN — HYDROCHLOROTHIAZIDE 12.5 MG: 12.5 TABLET ORAL at 08:12

## 2023-12-29 RX ADMIN — GABAPENTIN 100 MG: 100 CAPSULE ORAL at 02:12

## 2023-12-29 RX ADMIN — PANTOPRAZOLE SODIUM 40 MG: 40 TABLET, DELAYED RELEASE ORAL at 08:12

## 2023-12-29 RX ADMIN — PROMETHAZINE HYDROCHLORIDE 6.25 MG: 25 INJECTION INTRAMUSCULAR; INTRAVENOUS at 06:12

## 2023-12-29 NOTE — PLAN OF CARE
Problem: Adult Inpatient Plan of Care  Goal: Plan of Care Review  Outcome: Ongoing, Progressing  Flowsheets (Taken 12/28/2023 2308)  Plan of Care Reviewed With: patient     Problem: Bariatric Environmental Safety  Goal: Safety Maintained with Care  Outcome: Ongoing, Progressing     Problem: Adjustment to Illness COPD (Chronic Obstructive Pulmonary Disease)  Goal: Optimal Chronic Illness Coping  Outcome: Ongoing, Progressing  Intervention: Support and Optimize Psychosocial Response  Flowsheets (Taken 12/28/2023 2308)  Supportive Measures:   active listening utilized   self-care encouraged   relaxation techniques promoted   positive reinforcement provided  Family/Support System Care:   involvement promoted   self-care encouraged   support provided     Problem: Skin Injury Risk Increased  Goal: Skin Health and Integrity  Outcome: Ongoing, Progressing  Intervention: Optimize Skin Protection  Flowsheets (Taken 12/28/2023 2308)  Pressure Reduction Techniques: frequent weight shift encouraged  Skin Protection: adhesive use limited  Head of Bed (HOB) Positioning: HOB elevated   Plan of care progressing.

## 2023-12-29 NOTE — PROGRESS NOTES
Pharmacist Intervention IV to PO Note    Umu Clayton is a 63 y.o. female being treated with IV medication doxycycline    Patient Data:    Vital Signs (Most Recent):  Temp: 98.2 °F (36.8 °C) (12/29/23 1135)  Pulse: 74 (12/29/23 1135)  Resp: 18 (12/29/23 1135)  BP: (!) 119/57 (12/29/23 1135)  SpO2: (!) 92 % (12/29/23 1135) Vital Signs (72h Range):  Temp:  [97 °F (36.1 °C)-100.1 °F (37.8 °C)]   Pulse:  []   Resp:  [17-51]   BP: (107-171)/()   SpO2:  [88 %-98 %]      CBC:  Recent Labs   Lab 12/27/23  0342 12/28/23  0321 12/29/23  0322   WBC 7.26 7.74 5.52   RBC 4.24 4.12 3.96*   HGB 11.4* 11.0* 10.6*   HCT 36.4* 35.9* 35.2*   * 133* 149*   MCV 86 87 89   MCH 26.9* 26.7* 26.8*   MCHC 31.3* 30.6* 30.1*     CMP:     Recent Labs   Lab 12/26/23 2047 12/27/23  0342 12/28/23  0321 12/29/23  0322   * 155* 111* 142*   CALCIUM 8.6* 8.4* 8.8 9.0   ALBUMIN 3.5  --   --   --    PROT 6.5  --   --   --     139 142 138   K 3.5 3.6 4.4 4.3   CO2 25 22* 27 26    106 106 103   BUN 10 11 16 26*   CREATININE 0.86 0.8 0.8 0.9   ALKPHOS 86  --   --   --    ALT 17  --   --   --    AST 19  --   --   --    BILITOT 0.4  --   --   --        Dietary Orders:  Diet Orders            Diet diabetic 2000 Calorie: Diabetic starting at 12/27 0134            Based on the following criteria, this patient qualifies for intravenous to oral conversion:  [x] The patients gastrointestinal tract is functioning (tolerating medications via oral or enteral route for 24 hours and tolerating food or enteral feeds for 24 hours).  [x] The patient is hemodynamically stable for 24 hours (heart rate <100 beats per minute, systolic blood pressure >99 mm Hg, and respiratory rate <20 breaths per minute).  [x] The patient shows clinical improvement (afebrile for at least 24 hours and white blood cell count downtrending or normalized). Additionally, the patient must be non-neutropenic (absolute neutrophil count >500 cells/mm3).  [x] For  antimicrobials, the patient has received IV therapy for at least 24 hours.    IV medication doxycycline will be changed to oral medication     Pharmacist's Name: Jordi Choudhary  Pharmacist's Extension: [phone]

## 2023-12-29 NOTE — ASSESSMENT & PLAN NOTE
Patient with Hypoxic Respiratory failure which is Acute.  she is not on home oxygen. Supplemental oxygen was provided and noted- Oxygen Concentration (%):  [32] 32    .   Signs/symptoms of respiratory failure include- tachypnea, increased work of breathing, respiratory distress, and wheezing. Contributing diagnoses includes - COPD Labs and images were reviewed. Patient Has not had a recent ABG. Will treat underlying causes and adjust management of respiratory failure as follows- continue with steroids and oxygen supplementation

## 2023-12-29 NOTE — SUBJECTIVE & OBJECTIVE
Interval History:   Seen at the bedside. IV steroids ordered on 12/28--still noted with mild wheezing that has improved. Will cont with day 2 of doxycycline     Review of Systems   Constitutional:  Negative for activity change, appetite change, fatigue and fever.   HENT:  Negative for trouble swallowing.    Respiratory:  Positive for cough, chest tightness, shortness of breath and wheezing.    Cardiovascular:  Negative for chest pain.   Gastrointestinal:  Negative for diarrhea, nausea and vomiting.   Genitourinary:  Negative for difficulty urinating and hematuria.   Musculoskeletal:  Positive for myalgias. Negative for gait problem.   Skin:  Negative for wound.   Neurological:  Positive for weakness.   Hematological:  Bruises/bleeds easily.   Psychiatric/Behavioral:  Negative for confusion. The patient is not nervous/anxious.      Objective:     Vital Signs (Most Recent):  Temp: 98.2 °F (36.8 °C) (12/29/23 1135)  Pulse: 74 (12/29/23 1135)  Resp: 18 (12/29/23 1135)  BP: (!) 119/57 (12/29/23 1135)  SpO2: (!) 92 % (12/29/23 1135) Vital Signs (24h Range):  Temp:  [97.6 °F (36.4 °C)-99.4 °F (37.4 °C)] 98.2 °F (36.8 °C)  Pulse:  [71-93] 74  Resp:  [18-24] 18  SpO2:  [92 %-96 %] 92 %  BP: (107-119)/(56-69) 119/57     Weight: (!) 159.3 kg (351 lb 3.1 oz)  Body mass index is 55 kg/m².    Intake/Output Summary (Last 24 hours) at 12/29/2023 1518  Last data filed at 12/28/2023 2242  Gross per 24 hour   Intake 358 ml   Output --   Net 358 ml           Physical Exam  Vitals and nursing note reviewed.   Constitutional:       Appearance: She is obese. She is ill-appearing.   HENT:      Head: Normocephalic and atraumatic.      Mouth/Throat:      Mouth: Mucous membranes are moist.   Eyes:      Extraocular Movements: Extraocular movements intact.   Cardiovascular:      Rate and Rhythm: Normal rate and regular rhythm.   Pulmonary:      Effort: No respiratory distress.      Breath sounds: Wheezing present.   Abdominal:      General:  Bowel sounds are normal. There is no distension.      Tenderness: There is no abdominal tenderness. There is no guarding.   Musculoskeletal:      Cervical back: Normal range of motion and neck supple.      Right lower leg: No edema.      Left lower leg: No edema.   Skin:     General: Skin is warm and dry.      Capillary Refill: Capillary refill takes 2 to 3 seconds.   Neurological:      Mental Status: She is alert and oriented to person, place, and time. Mental status is at baseline.      Motor: No weakness.   Psychiatric:         Mood and Affect: Mood normal.         Behavior: Behavior normal.             Significant Labs: All pertinent labs within the past 24 hours have been reviewed.    Significant Imaging: I have reviewed all pertinent imaging results/findings within the past 24 hours.

## 2023-12-29 NOTE — PROGRESS NOTES
Individual Follow-Up Form    12/29/2023    Quit Date: To be determined    Clinical Status of Patient: Inpatient    Length of Service: 30 minutes    Comments: Smoking cessation education follow-up: MD contacted to request a 2nd nicotine patch (2 x 21 mg nicotine patch Q day) Reviewed details of pt's initial [facility] Smoking Cessation (virtual) appointment, and she states no conflicts w/ appt. date and time.    Diagnosis: F17.210    Next Visit: : 1/9/24 at 10:30 am

## 2023-12-29 NOTE — ASSESSMENT & PLAN NOTE
Chronic, controlled. Latest blood pressure and vitals reviewed-     Temp:  [97.6 °F (36.4 °C)-99.4 °F (37.4 °C)]   Pulse:  [71-93]   Resp:  [18-24]   BP: (107-119)/(56-69)   SpO2:  [92 %-96 %] .   Home meds for hypertension were reviewed and noted below.   Hypertension Medications               furosemide (LASIX) 40 MG tablet Take 1 tablet (40 mg total) by mouth once daily.    lisinopriL-hydrochlorothiazide (PRINZIDE,ZESTORETIC) 20-12.5 mg per tablet Take 2 tablets by mouth once daily.            While in the hospital, will manage blood pressure as follows; Continue home antihypertensive regimen    Will utilize p.r.n. blood pressure medication only if patient's blood pressure greater than 180/110 and she develops symptoms such as worsening chest pain or shortness of breath.

## 2023-12-29 NOTE — PROGRESS NOTES
[facility] Medicine  Progress Note    Patient Name: Umu Clayton  MRN: [id number]  Patient Class: IP- Inpatient   Admission Date: 12/26/2023  Length of Stay: 1 days  Attending Physician: Ann Marie Ray*  Primary Care Provider: Mike Mcdermott MD        Subjective:     Principal Problem:COPD with acute exacerbation        HPI:  Umu Clayton is a 63 yr old female with PMH of COPD, hypertension, anxiety, restless legs syndrome who presents with cough, headache, body aches.    Past few days she has had worsening body aches, headache, productive cough, wheezing/shortness of breath with the associated symptoms of hot and cold.  She reports recent sick contacts.  She presents for further evaluation and care.    Triage vitals were significant for tachycardia, tachypnea, hypoxia 90%.  CBC was remarkable normocytic anemia.  CMP was remarkable for slightly elevated glucose and slightly lower calcium levels.    Troponin was negative.    CTA was unremarkable for PE.  Chest x-ray was underinflated but no obvious consolidation.  Influezna was negative.    Patient admitted for possible COPD exacerbation    Overview/Hospital Course:   She was admitted to the hospital medicine service for further care.  Noted with a COPD exacerbation on admission.  Patient does not use O2.  Chest x-ray with no obvious consolidation.  She was influenza negative.  Negative PE on CTA.  She was started on nebs, steroids, and supplemental O2.  Notable wheezing noted on exam.  She was given Mag riders on yesterday, 12/27/2023, for wheezing.  Wheezing has not improved, she was switched from oral steroids to IV Solu-Medrol.  Because of poor clinical presentation, she was started doxycycline empirically.    Interval History:   Seen at the bedside. IV steroids ordered on 12/28--still noted with mild wheezing that has improved. Will cont with day 2 of doxycycline     Review of Systems   Constitutional:  Negative for activity change,  appetite change, fatigue and fever.   HENT:  Negative for trouble swallowing.    Respiratory:  Positive for cough, chest tightness, shortness of breath and wheezing.    Cardiovascular:  Negative for chest pain.   Gastrointestinal:  Negative for diarrhea, nausea and vomiting.   Genitourinary:  Negative for difficulty urinating and hematuria.   Musculoskeletal:  Positive for myalgias. Negative for gait problem.   Skin:  Negative for wound.   Neurological:  Positive for weakness.   Hematological:  Bruises/bleeds easily.   Psychiatric/Behavioral:  Negative for confusion. The patient is not nervous/anxious.      Objective:     Vital Signs (Most Recent):  Temp: 98.2 °F (36.8 °C) (12/29/23 1135)  Pulse: 74 (12/29/23 1135)  Resp: 18 (12/29/23 1135)  BP: (!) 119/57 (12/29/23 1135)  SpO2: (!) 92 % (12/29/23 1135) Vital Signs (24h Range):  Temp:  [97.6 °F (36.4 °C)-99.4 °F (37.4 °C)] 98.2 °F (36.8 °C)  Pulse:  [71-93] 74  Resp:  [18-24] 18  SpO2:  [92 %-96 %] 92 %  BP: (107-119)/(56-69) 119/57     Weight: (!) 159.3 kg (351 lb 3.1 oz)  Body mass index is 55 kg/m².    Intake/Output Summary (Last 24 hours) at 12/29/2023 1518  Last data filed at 12/28/2023 2242  Gross per 24 hour   Intake 358 ml   Output --   Net 358 ml           Physical Exam  Vitals and nursing note reviewed.   Constitutional:       Appearance: She is obese. She is ill-appearing.   HENT:      Head: Normocephalic and atraumatic.      Mouth/Throat:      Mouth: Mucous membranes are moist.   Eyes:      Extraocular Movements: Extraocular movements intact.   Cardiovascular:      Rate and Rhythm: Normal rate and regular rhythm.   Pulmonary:      Effort: No respiratory distress.      Breath sounds: Wheezing present.   Abdominal:      General: Bowel sounds are normal. There is no distension.      Tenderness: There is no abdominal tenderness. There is no guarding.   Musculoskeletal:      Cervical back: Normal range of motion and neck supple.      Right lower leg: No  edema.      Left lower leg: No edema.   Skin:     General: Skin is warm and dry.      Capillary Refill: Capillary refill takes 2 to 3 seconds.   Neurological:      Mental Status: She is alert and oriented to person, place, and time. Mental status is at baseline.      Motor: No weakness.   Psychiatric:         Mood and Affect: Mood normal.         Behavior: Behavior normal.             Significant Labs: All pertinent labs within the past 24 hours have been reviewed.    Significant Imaging: I have reviewed all pertinent imaging results/findings within the past 24 hours.    Assessment/Plan:      * COPD with acute exacerbation  Wheezing     Patient's COPD is with exacerbation noted by continued dyspnea and use of accessory muscles for breathing currently.  Patient is currently on COPD Pathway. Continue scheduled inhalers steroids, oxygen, (antibiotics have been held) and monitor respiratory status closely.   -  Will add IV steroids as well as empiric antibiotics--received day 2 of doxycycline  - continue neb treatments  - continue supplemental O2    Tobacco dependency  Assistance with smoking cessation was offered, including:  [x]  Medications  []  Counseling  []  Printed Information on Smoking Cessation  [x]  Referral to a Smoking Cessation Program    Patient was counseled regarding smoking for 3-10 minutes.      Restless leg syndrome  We can continue home meds      Acute on chronic respiratory failure with hypoxia  Patient with Hypoxic Respiratory failure which is Acute.  she is not on home oxygen. Supplemental oxygen was provided and noted- Oxygen Concentration (%):  [32] 32    .   Signs/symptoms of respiratory failure include- tachypnea, increased work of breathing, respiratory distress, and wheezing. Contributing diagnoses includes - COPD Labs and images were reviewed. Patient Has not had a recent ABG. Will treat underlying causes and adjust management of respiratory failure as follows- continue with steroids and  oxygen supplementation    Severe obesity (BMI >= 40)  Body mass index is 55 kg/m². Morbid obesity complicates all aspects of disease management from diagnostic modalities to treatment. Weight loss encouraged and health benefits explained to patient.         Smoker  Patient current smoker.  Had a discussion about attempting to stop.  We will continue to encourage smoking cessation and offer nicotine patches      Hypertension  Chronic, controlled. Latest blood pressure and vitals reviewed-     Temp:  [97.6 °F (36.4 °C)-99.4 °F (37.4 °C)]   Pulse:  [71-93]   Resp:  [18-24]   BP: (107-119)/(56-69)   SpO2:  [92 %-96 %] .   Home meds for hypertension were reviewed and noted below.   Hypertension Medications               furosemide (LASIX) 40 MG tablet Take 1 tablet (40 mg total) by mouth once daily.    lisinopriL-hydrochlorothiazide (PRINZIDE,ZESTORETIC) 20-12.5 mg per tablet Take 2 tablets by mouth once daily.            While in the hospital, will manage blood pressure as follows; Continue home antihypertensive regimen    Will utilize p.r.n. blood pressure medication only if patient's blood pressure greater than 180/110 and she develops symptoms such as worsening chest pain or shortness of breath.    Anxiety  Continue home meds        VTE Risk Mitigation (From admission, onward)           Ordered     IP VTE HIGH RISK PATIENT  Once         12/27/23 0134     Place sequential compression device  Until discontinued         12/27/23 0134                    Discharge Planning   SANJANA:      Code Status: Full Code   Is the patient medically ready for discharge?:     Reason for patient still in hospital (select all that apply): Laboratory test, Treatment, Imaging, and PT / OT recommendations  Discharge Plan A: Home, Home with family, Home Health   Discharge Delays: None known at this time              Anais Mathews NP  Department of Hospital Medicine   [facility]

## 2023-12-29 NOTE — NURSING
Rapid Response Nurse Note     20 g USG PIV inserted to left upper arm. Blood return noted. Flushed without difficulty. No acute issues at this time. Reviewed plan of care with bedside RNSofiya .   Please call Rapid Response RN, Lovely Owen RN with any questions or concerns at [phone].

## 2023-12-29 NOTE — PLAN OF CARE
Pt is not medically ready for discharge to home today. HH referrals were sent. She is possible discharge tomorrow with HH services. She will be transported by her spouse when discharged. CM will continue to follow pt during this hospital stay and provide any discharge needs.  Future Appointments   Date Time Provider Department Center   1/9/2024 10:30 AM Arnulfo Chavarria, RRT [facility]   1/29/2024 10:30 AM Mike Mcdermott MD [facility]      12/29/23 1501   Post-Acute Status   Post-Acute Authorization Home Health   Home Health Status Referrals Sent   Coverage [facility]   Hospital Resources/Appts/Education Provided Appointments scheduled and added to AVS   Discharge Delays None known at this time   Discharge Plan   Discharge Plan A Home;Home with family;Home Health

## 2023-12-29 NOTE — ASSESSMENT & PLAN NOTE
Wheezing     Patient's COPD is with exacerbation noted by continued dyspnea and use of accessory muscles for breathing currently.  Patient is currently on COPD Pathway. Continue scheduled inhalers steroids, oxygen, (antibiotics have been held) and monitor respiratory status closely.   -  Will add IV steroids as well as empiric antibiotics--received day 2 of doxycycline  - continue neb treatments  - continue supplemental O2

## 2023-12-30 LAB
ANION GAP SERPL CALC-SCNC: 11 MMOL/L (ref 8–16)
BASOPHILS # BLD AUTO: 0.01 K/UL (ref 0–0.2)
BASOPHILS NFR BLD: 0.2 % (ref 0–1.9)
BUN SERPL-MCNC: 30 MG/DL (ref 8–23)
CALCIUM SERPL-MCNC: 8.9 MG/DL (ref 8.7–10.5)
CHLORIDE SERPL-SCNC: 104 MMOL/L (ref 95–110)
CO2 SERPL-SCNC: 24 MMOL/L (ref 23–29)
CREAT SERPL-MCNC: 0.8 MG/DL (ref 0.5–1.4)
DIFFERENTIAL METHOD BLD: ABNORMAL
EOSINOPHIL # BLD AUTO: 0 K/UL (ref 0–0.5)
EOSINOPHIL NFR BLD: 0 % (ref 0–8)
ERYTHROCYTE [DISTWIDTH] IN BLOOD BY AUTOMATED COUNT: 17.2 % (ref 11.5–14.5)
EST. GFR  (NO RACE VARIABLE): >60 ML/MIN/1.73 M^2
GLUCOSE SERPL-MCNC: 146 MG/DL (ref 70–110)
HCT VFR BLD AUTO: 34.5 % (ref 37–48.5)
HGB BLD-MCNC: 10.6 G/DL (ref 12–16)
IMM GRANULOCYTES # BLD AUTO: 0.06 K/UL (ref 0–0.04)
IMM GRANULOCYTES NFR BLD AUTO: 1 % (ref 0–0.5)
LYMPHOCYTES # BLD AUTO: 0.6 K/UL (ref 1–4.8)
LYMPHOCYTES NFR BLD: 10.5 % (ref 18–48)
MAGNESIUM SERPL-MCNC: 2.3 MG/DL (ref 1.6–2.6)
MCH RBC QN AUTO: 26.8 PG (ref 27–31)
MCHC RBC AUTO-ENTMCNC: 30.7 G/DL (ref 32–36)
MCV RBC AUTO: 87 FL (ref 82–98)
MONOCYTES # BLD AUTO: 0.2 K/UL (ref 0.3–1)
MONOCYTES NFR BLD: 3.9 % (ref 4–15)
NEUTROPHILS # BLD AUTO: 5.2 K/UL (ref 1.8–7.7)
NEUTROPHILS NFR BLD: 84.4 % (ref 38–73)
NRBC BLD-RTO: 0 /100 WBC
PHOSPHATE SERPL-MCNC: 3.5 MG/DL (ref 2.7–4.5)
PLATELET # BLD AUTO: 141 K/UL (ref 150–450)
PMV BLD AUTO: 11.4 FL (ref 9.2–12.9)
POTASSIUM SERPL-SCNC: 3.9 MMOL/L (ref 3.5–5.1)
RBC # BLD AUTO: 3.96 M/UL (ref 4–5.4)
SODIUM SERPL-SCNC: 139 MMOL/L (ref 136–145)
WBC # BLD AUTO: 6.12 K/UL (ref 3.9–12.7)

## 2023-12-30 PROCEDURE — 25000242 PHARM REV CODE 250 ALT 637 W/ HCPCS: Performed by: NURSE PRACTITIONER

## 2023-12-30 PROCEDURE — 80048 BASIC METABOLIC PNL TOTAL CA: CPT | Performed by: STUDENT IN AN ORGANIZED HEALTH CARE EDUCATION/TRAINING PROGRAM

## 2023-12-30 PROCEDURE — 25000003 PHARM REV CODE 250: Performed by: STUDENT IN AN ORGANIZED HEALTH CARE EDUCATION/TRAINING PROGRAM

## 2023-12-30 PROCEDURE — 87070 CULTURE OTHR SPECIMN AEROBIC: CPT | Performed by: NURSE PRACTITIONER

## 2023-12-30 PROCEDURE — 87147 CULTURE TYPE IMMUNOLOGIC: CPT | Performed by: NURSE PRACTITIONER

## 2023-12-30 PROCEDURE — 83735 ASSAY OF MAGNESIUM: CPT | Performed by: STUDENT IN AN ORGANIZED HEALTH CARE EDUCATION/TRAINING PROGRAM

## 2023-12-30 PROCEDURE — 87205 SMEAR GRAM STAIN: CPT | Performed by: NURSE PRACTITIONER

## 2023-12-30 PROCEDURE — 94640 AIRWAY INHALATION TREATMENT: CPT

## 2023-12-30 PROCEDURE — 11000001 HC ACUTE MED/SURG PRIVATE ROOM

## 2023-12-30 PROCEDURE — 85025 COMPLETE CBC W/AUTO DIFF WBC: CPT | Performed by: STUDENT IN AN ORGANIZED HEALTH CARE EDUCATION/TRAINING PROGRAM

## 2023-12-30 PROCEDURE — 63600175 PHARM REV CODE 636 W HCPCS: Performed by: NURSE PRACTITIONER

## 2023-12-30 PROCEDURE — 25000003 PHARM REV CODE 250: Performed by: NURSE PRACTITIONER

## 2023-12-30 PROCEDURE — 99900035 HC TECH TIME PER 15 MIN (STAT)

## 2023-12-30 PROCEDURE — 27000221 HC OXYGEN, UP TO 24 HOURS

## 2023-12-30 PROCEDURE — 84100 ASSAY OF PHOSPHORUS: CPT | Performed by: STUDENT IN AN ORGANIZED HEALTH CARE EDUCATION/TRAINING PROGRAM

## 2023-12-30 PROCEDURE — S4991 NICOTINE PATCH NONLEGEND: HCPCS | Performed by: NURSE PRACTITIONER

## 2023-12-30 PROCEDURE — 36415 COLL VENOUS BLD VENIPUNCTURE: CPT | Performed by: STUDENT IN AN ORGANIZED HEALTH CARE EDUCATION/TRAINING PROGRAM

## 2023-12-30 PROCEDURE — 94761 N-INVAS EAR/PLS OXIMETRY MLT: CPT

## 2023-12-30 PROCEDURE — 25000003 PHARM REV CODE 250: Performed by: FAMILY MEDICINE

## 2023-12-30 RX ORDER — SODIUM CHLORIDE FOR INHALATION 3 %
4 VIAL, NEBULIZER (ML) INHALATION
Status: DISCONTINUED | OUTPATIENT
Start: 2023-12-30 | End: 2023-12-31 | Stop reason: HOSPADM

## 2023-12-30 RX ORDER — HYDROCODONE BITARTRATE AND ACETAMINOPHEN 5; 325 MG/1; MG/1
1 TABLET ORAL EVERY 6 HOURS PRN
Status: DISCONTINUED | OUTPATIENT
Start: 2023-12-30 | End: 2023-12-31 | Stop reason: HOSPADM

## 2023-12-30 RX ORDER — TALC
9 POWDER (GRAM) TOPICAL NIGHTLY PRN
Status: DISCONTINUED | OUTPATIENT
Start: 2023-12-30 | End: 2023-12-31 | Stop reason: HOSPADM

## 2023-12-30 RX ORDER — BUDESONIDE 0.25 MG/2ML
0.25 INHALANT ORAL DAILY
Status: DISCONTINUED | OUTPATIENT
Start: 2023-12-30 | End: 2023-12-31 | Stop reason: HOSPADM

## 2023-12-30 RX ORDER — ALBUTEROL SULFATE 2.5 MG/.5ML
2.5 SOLUTION RESPIRATORY (INHALATION)
Status: DISCONTINUED | OUTPATIENT
Start: 2023-12-30 | End: 2023-12-31 | Stop reason: HOSPADM

## 2023-12-30 RX ORDER — OXYCODONE HYDROCHLORIDE 5 MG/1
5 TABLET ORAL ONCE
Status: COMPLETED | OUTPATIENT
Start: 2023-12-30 | End: 2023-12-30

## 2023-12-30 RX ORDER — PREDNISONE 20 MG/1
40 TABLET ORAL DAILY
Status: DISCONTINUED | OUTPATIENT
Start: 2023-12-30 | End: 2023-12-31 | Stop reason: HOSPADM

## 2023-12-30 RX ADMIN — SUCRALFATE 1 G: 1 TABLET ORAL at 09:12

## 2023-12-30 RX ADMIN — AZITHROMYCIN MONOHYDRATE 500 MG: 500 INJECTION, POWDER, LYOPHILIZED, FOR SOLUTION INTRAVENOUS at 04:12

## 2023-12-30 RX ADMIN — BENZONATATE 100 MG: 100 CAPSULE ORAL at 09:12

## 2023-12-30 RX ADMIN — BENZONATATE 100 MG: 100 CAPSULE ORAL at 02:12

## 2023-12-30 RX ADMIN — PREDNISONE 40 MG: 20 TABLET ORAL at 12:12

## 2023-12-30 RX ADMIN — IPRATROPIUM BROMIDE AND ALBUTEROL SULFATE 3 ML: 2.5; .5 SOLUTION RESPIRATORY (INHALATION) at 07:12

## 2023-12-30 RX ADMIN — PANTOPRAZOLE SODIUM 40 MG: 40 TABLET, DELAYED RELEASE ORAL at 09:12

## 2023-12-30 RX ADMIN — IPRATROPIUM BROMIDE AND ALBUTEROL SULFATE 3 ML: 2.5; .5 SOLUTION RESPIRATORY (INHALATION) at 04:12

## 2023-12-30 RX ADMIN — HYDROCHLOROTHIAZIDE 12.5 MG: 12.5 TABLET ORAL at 09:12

## 2023-12-30 RX ADMIN — IPRATROPIUM BROMIDE AND ALBUTEROL SULFATE 3 ML: 2.5; .5 SOLUTION RESPIRATORY (INHALATION) at 11:12

## 2023-12-30 RX ADMIN — HYDROCODONE BITARTRATE AND ACETAMINOPHEN 1 TABLET: 5; 325 TABLET ORAL at 02:12

## 2023-12-30 RX ADMIN — PROMETHAZINE HYDROCHLORIDE 6.25 MG: 25 INJECTION INTRAMUSCULAR; INTRAVENOUS at 10:12

## 2023-12-30 RX ADMIN — FLUTICASONE PROPIONATE 50 MCG: 50 SPRAY, METERED NASAL at 09:12

## 2023-12-30 RX ADMIN — SUCRALFATE 1 G: 1 TABLET ORAL at 04:12

## 2023-12-30 RX ADMIN — DOXYCYCLINE HYCLATE 100 MG: 100 TABLET, COATED ORAL at 09:12

## 2023-12-30 RX ADMIN — LISINOPRIL 20 MG: 20 TABLET ORAL at 09:12

## 2023-12-30 RX ADMIN — IPRATROPIUM BROMIDE AND ALBUTEROL SULFATE 3 ML: 2.5; .5 SOLUTION RESPIRATORY (INHALATION) at 03:12

## 2023-12-30 RX ADMIN — Medication 9 MG: at 09:12

## 2023-12-30 RX ADMIN — SERTRALINE HYDROCHLORIDE 200 MG: 50 TABLET ORAL at 09:12

## 2023-12-30 RX ADMIN — CETIRIZINE HYDROCHLORIDE 10 MG: 10 TABLET, FILM COATED ORAL at 09:12

## 2023-12-30 RX ADMIN — NICOTINE 1 PATCH: 21 PATCH, EXTENDED RELEASE TRANSDERMAL at 09:12

## 2023-12-30 RX ADMIN — OXYCODONE HYDROCHLORIDE 5 MG: 5 TABLET ORAL at 09:12

## 2023-12-30 RX ADMIN — SUCRALFATE 1 G: 1 TABLET ORAL at 11:12

## 2023-12-30 RX ADMIN — GABAPENTIN 100 MG: 100 CAPSULE ORAL at 09:12

## 2023-12-30 RX ADMIN — ROPINIROLE HYDROCHLORIDE 0.5 MG: 0.25 TABLET, FILM COATED ORAL at 09:12

## 2023-12-30 RX ADMIN — IPRATROPIUM BROMIDE AND ALBUTEROL SULFATE 3 ML: 2.5; .5 SOLUTION RESPIRATORY (INHALATION) at 12:12

## 2023-12-30 RX ADMIN — GABAPENTIN 100 MG: 100 CAPSULE ORAL at 02:12

## 2023-12-30 RX ADMIN — POLYETHYLENE GLYCOL 3350 17 G: 17 POWDER, FOR SOLUTION ORAL at 09:12

## 2023-12-30 RX ADMIN — FLUTICASONE FUROATE AND VILANTEROL TRIFENATATE 1 PUFF: 100; 25 POWDER RESPIRATORY (INHALATION) at 07:12

## 2023-12-30 RX ADMIN — FOLIC ACID 1 MG: 1 TABLET ORAL at 09:12

## 2023-12-30 RX ADMIN — FERROUS SULFATE TAB 325 MG (65 MG ELEMENTAL FE) 1 EACH: 325 (65 FE) TAB at 09:12

## 2023-12-30 RX ADMIN — SUCRALFATE 1 G: 1 TABLET ORAL at 06:12

## 2023-12-30 NOTE — PROGRESS NOTES
[facility] Medicine  Progress Note    Patient Name: Umu Clayton  MRN: [id number]  Patient Class: IP- Inpatient   Admission Date: 12/26/2023  Length of Stay: 2 days  Attending Physician: Ann Marie Ray*  Primary Care Provider: Mike Mcdermott MD        Subjective:     Principal Problem:COPD with acute exacerbation        HPI:  Umu Clayton is a 63 yr old female with PMH of COPD, hypertension, anxiety, restless legs syndrome who presents with cough, headache, body aches.    Past few days she has had worsening body aches, headache, productive cough, wheezing/shortness of breath with the associated symptoms of hot and cold.  She reports recent sick contacts.  She presents for further evaluation and care.    Triage vitals were significant for tachycardia, tachypnea, hypoxia 90%.  CBC was remarkable normocytic anemia.  CMP was remarkable for slightly elevated glucose and slightly lower calcium levels.    Troponin was negative.    CTA was unremarkable for PE.  Chest x-ray was underinflated but no obvious consolidation.  Influezna was negative.    Patient admitted for possible COPD exacerbation    Overview/Hospital Course:   She was admitted to the hospital medicine service for further care.  Noted with a COPD exacerbation on admission.  Patient does not use O2.  Chest x-ray with no obvious consolidation.  She was influenza negative.  Negative PE on CTA.  She was started on nebs, steroids, and supplemental O2.  Notable wheezing noted on exam.  She was given Mag riders on yesterday, 12/27/2023, for wheezing.  Wheezing has not improved, she was switched from oral steroids to IV Solu-Medrol.  Because of poor clinical presentation, she was started doxycycline empirically.  Still noted with wheezing on exam.  In addition to doxycycline we will give azithromycin.  Will add Pulmicort to regimen.  CT chest is pending.    Interval History:   Seen at the bedside.  Still noted with wheezing.  Will add  azithromycin to her antibiotic regimen.  Continue nebs with the addition of Pulmicort.  Following CT chest that is pending.    Review of Systems   Constitutional:  Negative for activity change, appetite change, fatigue and fever.   HENT:  Negative for trouble swallowing.    Respiratory:  Positive for cough, chest tightness, shortness of breath and wheezing.    Cardiovascular:  Negative for chest pain.   Gastrointestinal:  Negative for diarrhea, nausea and vomiting.   Genitourinary:  Negative for difficulty urinating and hematuria.   Musculoskeletal:  Positive for myalgias. Negative for gait problem.   Skin:  Negative for wound.   Neurological:  Positive for weakness.   Hematological:  Bruises/bleeds easily.   Psychiatric/Behavioral:  Negative for confusion. The patient is not nervous/anxious.      Objective:     Vital Signs (Most Recent):  Temp: 98.1 °F (36.7 °C) (12/30/23 1148)  Pulse: 78 (12/30/23 1509)  Resp: 18 (12/30/23 1509)  BP: (!) 159/78 (12/30/23 1148)  SpO2: (!) 93 % (12/30/23 1509) Vital Signs (24h Range):  Temp:  [97.5 °F (36.4 °C)-98.2 °F (36.8 °C)] 98.1 °F (36.7 °C)  Pulse:  [] 78  Resp:  [18-23] 18  SpO2:  [87 %-96 %] 93 %  BP: (115-159)/(59-79) 159/78     Weight: (!) 136.9 kg (301 lb 13 oz)  Body mass index is 47.27 kg/m².  No intake or output data in the 24 hours ending 12/30/23 1528        Physical Exam  Vitals and nursing note reviewed.   Constitutional:       Appearance: She is obese. She is ill-appearing.   HENT:      Head: Normocephalic and atraumatic.      Mouth/Throat:      Mouth: Mucous membranes are moist.   Eyes:      Extraocular Movements: Extraocular movements intact.   Cardiovascular:      Rate and Rhythm: Normal rate and regular rhythm.   Pulmonary:      Effort: No respiratory distress.      Breath sounds: Wheezing present.   Abdominal:      General: Bowel sounds are normal. There is no distension.      Tenderness: There is no abdominal tenderness. There is no guarding.    Musculoskeletal:      Cervical back: Normal range of motion and neck supple.      Right lower leg: No edema.      Left lower leg: No edema.   Skin:     General: Skin is warm and dry.      Capillary Refill: Capillary refill takes 2 to 3 seconds.   Neurological:      Mental Status: She is alert and oriented to person, place, and time. Mental status is at baseline.      Motor: No weakness.   Psychiatric:         Mood and Affect: Mood normal.         Behavior: Behavior normal.             Significant Labs: All pertinent labs within the past 24 hours have been reviewed.    Significant Imaging: I have reviewed all pertinent imaging results/findings within the past 24 hours.    Assessment/Plan:      * COPD with acute exacerbation  Wheezing     Patient's COPD is with exacerbation noted by continued dyspnea and use of accessory muscles for breathing currently.  Patient is currently on COPD Pathway. Continue scheduled inhalers steroids, oxygen, (antibiotics have been held) and monitor respiratory status closely.   -  Will add IV steroids as well as empiric antibiotics--received day 3 of doxycycline and add an azithromycin  - continue neb treatments and adding Pulmicort  -IS added  - continue supplemental O2    Tobacco dependency  Assistance with smoking cessation was offered, including:  [x]  Medications  []  Counseling  []  Printed Information on Smoking Cessation  [x]  Referral to a Smoking Cessation Program    Patient was counseled regarding smoking for 3-10 minutes.      Restless leg syndrome  We can continue home meds      Acute on chronic respiratory failure with hypoxia  Patient with Hypoxic Respiratory failure which is Acute.  she is not on home oxygen. Supplemental oxygen was provided and noted-      .   Signs/symptoms of respiratory failure include- tachypnea, increased work of breathing, respiratory distress, and wheezing. Contributing diagnoses includes - COPD Labs and images were reviewed. Patient Has not had a  recent ABG. Will treat underlying causes and adjust management of respiratory failure as follows- continue with steroids and oxygen supplementation    Severe obesity (BMI >= 40)  Body mass index is 47.27 kg/m². Morbid obesity complicates all aspects of disease management from diagnostic modalities to treatment. Weight loss encouraged and health benefits explained to patient.         Smoker  Patient current smoker.  Had a discussion about attempting to stop.  We will continue to encourage smoking cessation and offer nicotine patches      Hypertension  Chronic, controlled. Latest blood pressure and vitals reviewed-     Temp:  [97.5 °F (36.4 °C)-98.2 °F (36.8 °C)]   Pulse:  []   Resp:  [18-23]   BP: (115-159)/(59-79)   SpO2:  [87 %-96 %] .   Home meds for hypertension were reviewed and noted below.   Hypertension Medications               furosemide (LASIX) 40 MG tablet Take 1 tablet (40 mg total) by mouth once daily.    lisinopriL-hydrochlorothiazide (PRINZIDE,ZESTORETIC) 20-12.5 mg per tablet Take 2 tablets by mouth once daily.            While in the hospital, will manage blood pressure as follows; Continue home antihypertensive regimen    Will utilize p.r.n. blood pressure medication only if patient's blood pressure greater than 180/110 and she develops symptoms such as worsening chest pain or shortness of breath.    Anxiety  Continue home meds        VTE Risk Mitigation (From admission, onward)           Ordered     IP VTE HIGH RISK PATIENT  Once         12/27/23 0134     Place sequential compression device  Until discontinued         12/27/23 0134                    Discharge Planning   SANJANA:      Code Status: Full Code   Is the patient medically ready for discharge?:     Reason for patient still in hospital (select all that apply): Laboratory test, Treatment, Imaging, and Consult recommendations  Discharge Plan A: Home, Home with family, Home Health   Discharge Delays: None known at this  time              Anais Mathews NP  Department of Hospital Medicine   [facility]

## 2023-12-30 NOTE — PLAN OF CARE
Problem: Adult Inpatient Plan of Care  Goal: Plan of Care Review  Outcome: Ongoing, Progressing     Problem: COPD (Chronic Obstructive Pulmonary Disease) Comorbidity  Goal: Maintenance of COPD Symptom Control  Outcome: Ongoing, Progressing

## 2023-12-30 NOTE — ASSESSMENT & PLAN NOTE
Chronic, controlled. Latest blood pressure and vitals reviewed-     Temp:  [97.5 °F (36.4 °C)-98.2 °F (36.8 °C)]   Pulse:  []   Resp:  [18-23]   BP: (115-159)/(59-79)   SpO2:  [87 %-96 %] .   Home meds for hypertension were reviewed and noted below.   Hypertension Medications               furosemide (LASIX) 40 MG tablet Take 1 tablet (40 mg total) by mouth once daily.    lisinopriL-hydrochlorothiazide (PRINZIDE,ZESTORETIC) 20-12.5 mg per tablet Take 2 tablets by mouth once daily.            While in the hospital, will manage blood pressure as follows; Continue home antihypertensive regimen    Will utilize p.r.n. blood pressure medication only if patient's blood pressure greater than 180/110 and she develops symptoms such as worsening chest pain or shortness of breath.

## 2023-12-30 NOTE — ASSESSMENT & PLAN NOTE
Wheezing     Patient's COPD is with exacerbation noted by continued dyspnea and use of accessory muscles for breathing currently.  Patient is currently on COPD Pathway. Continue scheduled inhalers steroids, oxygen, (antibiotics have been held) and monitor respiratory status closely.   -  Will add IV steroids as well as empiric antibiotics--received day 3 of doxycycline and add an azithromycin  - continue neb treatments and adding Pulmicort  -IS added  - continue supplemental O2

## 2023-12-30 NOTE — ASSESSMENT & PLAN NOTE
Body mass index is 47.27 kg/m². Morbid obesity complicates all aspects of disease management from diagnostic modalities to treatment. Weight loss encouraged and health benefits explained to patient.

## 2023-12-30 NOTE — SUBJECTIVE & OBJECTIVE
Interval History:   Seen at the bedside.  Still noted with wheezing.  Will add azithromycin to her antibiotic regimen.  Continue nebs with the addition of Pulmicort.  Following CT chest that is pending.    Review of Systems   Constitutional:  Negative for activity change, appetite change, fatigue and fever.   HENT:  Negative for trouble swallowing.    Respiratory:  Positive for cough, chest tightness, shortness of breath and wheezing.    Cardiovascular:  Negative for chest pain.   Gastrointestinal:  Negative for diarrhea, nausea and vomiting.   Genitourinary:  Negative for difficulty urinating and hematuria.   Musculoskeletal:  Positive for myalgias. Negative for gait problem.   Skin:  Negative for wound.   Neurological:  Positive for weakness.   Hematological:  Bruises/bleeds easily.   Psychiatric/Behavioral:  Negative for confusion. The patient is not nervous/anxious.      Objective:     Vital Signs (Most Recent):  Temp: 98.1 °F (36.7 °C) (12/30/23 1148)  Pulse: 78 (12/30/23 1509)  Resp: 18 (12/30/23 1509)  BP: (!) 159/78 (12/30/23 1148)  SpO2: (!) 93 % (12/30/23 1509) Vital Signs (24h Range):  Temp:  [97.5 °F (36.4 °C)-98.2 °F (36.8 °C)] 98.1 °F (36.7 °C)  Pulse:  [] 78  Resp:  [18-23] 18  SpO2:  [87 %-96 %] 93 %  BP: (115-159)/(59-79) 159/78     Weight: (!) 136.9 kg (301 lb 13 oz)  Body mass index is 47.27 kg/m².  No intake or output data in the 24 hours ending 12/30/23 1528        Physical Exam  Vitals and nursing note reviewed.   Constitutional:       Appearance: She is obese. She is ill-appearing.   HENT:      Head: Normocephalic and atraumatic.      Mouth/Throat:      Mouth: Mucous membranes are moist.   Eyes:      Extraocular Movements: Extraocular movements intact.   Cardiovascular:      Rate and Rhythm: Normal rate and regular rhythm.   Pulmonary:      Effort: No respiratory distress.      Breath sounds: Wheezing present.   Abdominal:      General: Bowel sounds are normal. There is no distension.       Tenderness: There is no abdominal tenderness. There is no guarding.   Musculoskeletal:      Cervical back: Normal range of motion and neck supple.      Right lower leg: No edema.      Left lower leg: No edema.   Skin:     General: Skin is warm and dry.      Capillary Refill: Capillary refill takes 2 to 3 seconds.   Neurological:      Mental Status: She is alert and oriented to person, place, and time. Mental status is at baseline.      Motor: No weakness.   Psychiatric:         Mood and Affect: Mood normal.         Behavior: Behavior normal.             Significant Labs: All pertinent labs within the past 24 hours have been reviewed.    Significant Imaging: I have reviewed all pertinent imaging results/findings within the past 24 hours.

## 2023-12-30 NOTE — NURSING
RAPID RESPONSE NURSE PROACTIVE ROUNDING NOTE     Time of Visit: 0330    Called per Bedside nurse for PIV placement. 20G PIV placed to R Forearm via U/s guidance. Blood return noted and no resistance met when flushed. NS locked.       FOLLOW-UP     Call back the Rapid Response Nurse, DIONY OLIVERA RN at [phone] for additional questions or concerns.

## 2023-12-30 NOTE — CONSULTS
Thank you for your consult to [facility]. We have reviewed the patient chart. No consultant services following the patient. This patient does not meet criteria for [facility] service at this time due to Patient has a condition likely to benefit from bedside evaluation such as COPD exacerbation with wheezing on exam and hypoxia.  Will not assume care of the patient at this time.    Irene Cervantes MD

## 2023-12-30 NOTE — PLAN OF CARE
Pt on oxygen in no apparent distress.  Breathing tx. Given with ok pt. Effort.  Will cont. To monitor.    [Negative] : Heme/Lymph

## 2023-12-31 VITALS
SYSTOLIC BLOOD PRESSURE: 143 MMHG | TEMPERATURE: 98 F | BODY MASS INDEX: 45.99 KG/M2 | DIASTOLIC BLOOD PRESSURE: 82 MMHG | OXYGEN SATURATION: 93 % | HEART RATE: 80 BPM | RESPIRATION RATE: 18 BRPM | HEIGHT: 67 IN | WEIGHT: 293 LBS

## 2023-12-31 PROBLEM — R06.2 WHEEZING: Status: RESOLVED | Noted: 2021-07-01 | Resolved: 2023-12-31

## 2023-12-31 LAB
ANION GAP SERPL CALC-SCNC: 12 MMOL/L (ref 8–16)
BASOPHILS # BLD AUTO: 0 K/UL (ref 0–0.2)
BASOPHILS NFR BLD: 0 % (ref 0–1.9)
BUN SERPL-MCNC: 26 MG/DL (ref 8–23)
CALCIUM SERPL-MCNC: 8.9 MG/DL (ref 8.7–10.5)
CHLORIDE SERPL-SCNC: 103 MMOL/L (ref 95–110)
CO2 SERPL-SCNC: 26 MMOL/L (ref 23–29)
CREAT SERPL-MCNC: 0.8 MG/DL (ref 0.5–1.4)
DIFFERENTIAL METHOD BLD: ABNORMAL
EOSINOPHIL # BLD AUTO: 0 K/UL (ref 0–0.5)
EOSINOPHIL NFR BLD: 0 % (ref 0–8)
ERYTHROCYTE [DISTWIDTH] IN BLOOD BY AUTOMATED COUNT: 17.2 % (ref 11.5–14.5)
EST. GFR  (NO RACE VARIABLE): >60 ML/MIN/1.73 M^2
GLUCOSE SERPL-MCNC: 99 MG/DL (ref 70–110)
HCT VFR BLD AUTO: 37.5 % (ref 37–48.5)
HGB BLD-MCNC: 11.6 G/DL (ref 12–16)
IMM GRANULOCYTES # BLD AUTO: 0.09 K/UL (ref 0–0.04)
IMM GRANULOCYTES NFR BLD AUTO: 1.2 % (ref 0–0.5)
LYMPHOCYTES # BLD AUTO: 1.3 K/UL (ref 1–4.8)
LYMPHOCYTES NFR BLD: 18.1 % (ref 18–48)
MAGNESIUM SERPL-MCNC: 2.2 MG/DL (ref 1.6–2.6)
MCH RBC QN AUTO: 26.5 PG (ref 27–31)
MCHC RBC AUTO-ENTMCNC: 30.9 G/DL (ref 32–36)
MCV RBC AUTO: 86 FL (ref 82–98)
MONOCYTES # BLD AUTO: 0.6 K/UL (ref 0.3–1)
MONOCYTES NFR BLD: 8.9 % (ref 4–15)
NEUTROPHILS # BLD AUTO: 5.2 K/UL (ref 1.8–7.7)
NEUTROPHILS NFR BLD: 71.8 % (ref 38–73)
NRBC BLD-RTO: 0 /100 WBC
PHOSPHATE SERPL-MCNC: 3.8 MG/DL (ref 2.7–4.5)
PLATELET # BLD AUTO: 166 K/UL (ref 150–450)
PMV BLD AUTO: 11 FL (ref 9.2–12.9)
POTASSIUM SERPL-SCNC: 3.7 MMOL/L (ref 3.5–5.1)
RBC # BLD AUTO: 4.37 M/UL (ref 4–5.4)
SODIUM SERPL-SCNC: 141 MMOL/L (ref 136–145)
WBC # BLD AUTO: 7.23 K/UL (ref 3.9–12.7)

## 2023-12-31 PROCEDURE — 83735 ASSAY OF MAGNESIUM: CPT | Performed by: STUDENT IN AN ORGANIZED HEALTH CARE EDUCATION/TRAINING PROGRAM

## 2023-12-31 PROCEDURE — 94640 AIRWAY INHALATION TREATMENT: CPT

## 2023-12-31 PROCEDURE — 90686 IIV4 VACC NO PRSV 0.5 ML IM: CPT | Performed by: FAMILY MEDICINE

## 2023-12-31 PROCEDURE — 25000003 PHARM REV CODE 250: Performed by: STUDENT IN AN ORGANIZED HEALTH CARE EDUCATION/TRAINING PROGRAM

## 2023-12-31 PROCEDURE — 25000003 PHARM REV CODE 250: Performed by: FAMILY MEDICINE

## 2023-12-31 PROCEDURE — 94761 N-INVAS EAR/PLS OXIMETRY MLT: CPT

## 2023-12-31 PROCEDURE — 36415 COLL VENOUS BLD VENIPUNCTURE: CPT | Performed by: STUDENT IN AN ORGANIZED HEALTH CARE EDUCATION/TRAINING PROGRAM

## 2023-12-31 PROCEDURE — 27000221 HC OXYGEN, UP TO 24 HOURS

## 2023-12-31 PROCEDURE — 63600175 PHARM REV CODE 636 W HCPCS: Performed by: FAMILY MEDICINE

## 2023-12-31 PROCEDURE — 25000242 PHARM REV CODE 250 ALT 637 W/ HCPCS: Performed by: NURSE PRACTITIONER

## 2023-12-31 PROCEDURE — 94799 UNLISTED PULMONARY SVC/PX: CPT

## 2023-12-31 PROCEDURE — 84100 ASSAY OF PHOSPHORUS: CPT | Performed by: STUDENT IN AN ORGANIZED HEALTH CARE EDUCATION/TRAINING PROGRAM

## 2023-12-31 PROCEDURE — S4991 NICOTINE PATCH NONLEGEND: HCPCS | Performed by: NURSE PRACTITIONER

## 2023-12-31 PROCEDURE — 80048 BASIC METABOLIC PNL TOTAL CA: CPT | Performed by: STUDENT IN AN ORGANIZED HEALTH CARE EDUCATION/TRAINING PROGRAM

## 2023-12-31 PROCEDURE — 25000003 PHARM REV CODE 250: Performed by: NURSE PRACTITIONER

## 2023-12-31 PROCEDURE — 63600175 PHARM REV CODE 636 W HCPCS: Performed by: NURSE PRACTITIONER

## 2023-12-31 PROCEDURE — 99900035 HC TECH TIME PER 15 MIN (STAT)

## 2023-12-31 PROCEDURE — 90471 IMMUNIZATION ADMIN: CPT | Performed by: FAMILY MEDICINE

## 2023-12-31 PROCEDURE — 3E02340 INTRODUCTION OF INFLUENZA VACCINE INTO MUSCLE, PERCUTANEOUS APPROACH: ICD-10-PCS | Performed by: FAMILY MEDICINE

## 2023-12-31 PROCEDURE — 85025 COMPLETE CBC W/AUTO DIFF WBC: CPT | Performed by: STUDENT IN AN ORGANIZED HEALTH CARE EDUCATION/TRAINING PROGRAM

## 2023-12-31 RX ORDER — IBUPROFEN 200 MG
2 TABLET ORAL DAILY
Qty: 60 PATCH | Refills: 1 | Status: SHIPPED | OUTPATIENT
Start: 2024-01-01

## 2023-12-31 RX ORDER — TALC
6 POWDER (GRAM) TOPICAL NIGHTLY
Qty: 30 TABLET | Refills: 11 | Status: SHIPPED | OUTPATIENT
Start: 2023-12-31

## 2023-12-31 RX ORDER — PREDNISONE 20 MG/1
40 TABLET ORAL DAILY
Qty: 10 TABLET | Refills: 0 | Status: SHIPPED | OUTPATIENT
Start: 2024-01-01 | End: 2024-01-06

## 2023-12-31 RX ORDER — GUAIFENESIN 100 MG/5ML
200 SOLUTION ORAL EVERY 4 HOURS PRN
Qty: 118 ML | Refills: 0 | Status: SHIPPED | OUTPATIENT
Start: 2023-12-31 | End: 2024-01-10

## 2023-12-31 RX ORDER — ALBUTEROL SULFATE 2.5 MG/.5ML
2.5 SOLUTION RESPIRATORY (INHALATION) EVERY 4 HOURS PRN
Qty: 30 EACH | Refills: 1 | Status: SHIPPED | OUTPATIENT
Start: 2023-12-31 | End: 2023-12-31

## 2023-12-31 RX ORDER — BUDESONIDE 0.25 MG/2ML
0.25 INHALANT ORAL DAILY PRN
Qty: 30 EACH | Refills: 1 | Status: SHIPPED | OUTPATIENT
Start: 2023-12-31 | End: 2024-01-12

## 2023-12-31 RX ORDER — ALBUTEROL SULFATE 2.5 MG/.5ML
2.5 SOLUTION RESPIRATORY (INHALATION) EVERY 4 HOURS PRN
Qty: 30 EACH | Refills: 2 | Status: SHIPPED | OUTPATIENT
Start: 2023-12-31 | End: 2024-01-31

## 2023-12-31 RX ORDER — DOXYCYCLINE HYCLATE 100 MG
100 TABLET ORAL EVERY 12 HOURS
Qty: 16 TABLET | Refills: 0 | Status: SHIPPED | OUTPATIENT
Start: 2023-12-31 | End: 2024-01-08

## 2023-12-31 RX ORDER — BUDESONIDE 0.25 MG/2ML
0.25 INHALANT ORAL DAILY
Qty: 30 EACH | Refills: 1 | Status: SHIPPED | OUTPATIENT
Start: 2024-01-01 | End: 2023-12-31

## 2023-12-31 RX ADMIN — POLYETHYLENE GLYCOL 3350 17 G: 17 POWDER, FOR SOLUTION ORAL at 08:12

## 2023-12-31 RX ADMIN — IPRATROPIUM BROMIDE AND ALBUTEROL SULFATE 3 ML: 2.5; .5 SOLUTION RESPIRATORY (INHALATION) at 05:12

## 2023-12-31 RX ADMIN — SERTRALINE HYDROCHLORIDE 200 MG: 50 TABLET ORAL at 08:12

## 2023-12-31 RX ADMIN — CETIRIZINE HYDROCHLORIDE 10 MG: 10 TABLET, FILM COATED ORAL at 08:12

## 2023-12-31 RX ADMIN — GABAPENTIN 100 MG: 100 CAPSULE ORAL at 08:12

## 2023-12-31 RX ADMIN — FLUTICASONE FUROATE AND VILANTEROL TRIFENATATE 1 PUFF: 100; 25 POWDER RESPIRATORY (INHALATION) at 09:12

## 2023-12-31 RX ADMIN — SUCRALFATE 1 G: 1 TABLET ORAL at 06:12

## 2023-12-31 RX ADMIN — IPRATROPIUM BROMIDE AND ALBUTEROL SULFATE 3 ML: 2.5; .5 SOLUTION RESPIRATORY (INHALATION) at 09:12

## 2023-12-31 RX ADMIN — HYDROCHLOROTHIAZIDE 12.5 MG: 12.5 TABLET ORAL at 08:12

## 2023-12-31 RX ADMIN — IPRATROPIUM BROMIDE AND ALBUTEROL SULFATE 3 ML: 2.5; .5 SOLUTION RESPIRATORY (INHALATION) at 11:12

## 2023-12-31 RX ADMIN — BENZONATATE 100 MG: 100 CAPSULE ORAL at 08:12

## 2023-12-31 RX ADMIN — PANTOPRAZOLE SODIUM 40 MG: 40 TABLET, DELAYED RELEASE ORAL at 08:12

## 2023-12-31 RX ADMIN — FOLIC ACID 1 MG: 1 TABLET ORAL at 08:12

## 2023-12-31 RX ADMIN — LISINOPRIL 20 MG: 20 TABLET ORAL at 08:12

## 2023-12-31 RX ADMIN — DOXYCYCLINE HYCLATE 100 MG: 100 TABLET, COATED ORAL at 08:12

## 2023-12-31 RX ADMIN — PREDNISONE 40 MG: 20 TABLET ORAL at 08:12

## 2023-12-31 RX ADMIN — NICOTINE 1 PATCH: 21 PATCH, EXTENDED RELEASE TRANSDERMAL at 08:12

## 2023-12-31 RX ADMIN — FERROUS SULFATE TAB 325 MG (65 MG ELEMENTAL FE) 1 EACH: 325 (65 FE) TAB at 08:12

## 2023-12-31 RX ADMIN — FLUTICASONE PROPIONATE 50 MCG: 50 SPRAY, METERED NASAL at 08:12

## 2023-12-31 RX ADMIN — INFLUENZA VIRUS VACCINE 0.5 ML: 15; 15; 15; 15 SUSPENSION INTRAMUSCULAR at 10:12

## 2023-12-31 RX ADMIN — SUCRALFATE 1 G: 1 TABLET ORAL at 10:12

## 2023-12-31 NOTE — PROGRESS NOTES
[facility]                    Pharmacy       Discharge Medication Education    Patient ACCEPTED medication education. Pharmacy has provided education on the name, indication, and possible side effects of the medication(s) prescribed, using teach-back method.     The following medications have also been discussed, during this admission.        Medication List        START taking these medications      budesonide 0.25 mg/2 mL nebulizer solution  Commonly known as: PULMICORT  Take 2 mLs (0.25 mg total) by nebulization daily as needed (shortness of breath and wheezing). Controller     doxycycline 100 MG tablet  Commonly known as: VIBRA-TABS  Take 1 tablet (100 mg total) by mouth every 12 (twelve) hours. for 8 days     guaiFENesin 100 mg/5 ml 100 mg/5 mL syrup  Commonly known as: ROBITUSSIN  Take 10 mLs (200 mg total) by mouth every 4 (four) hours as needed for Congestion.     melatonin 3 mg tablet  Commonly known as: MELATIN  Take 2 tablets (6 mg total) by mouth nightly.     predniSONE 20 MG tablet  Commonly known as: DELTASONE  Take 2 tablets (40 mg total) by mouth once daily. for 5 days  Start taking on: January 1, 2024            CHANGE how you take these medications      albuterol sulfate 2.5 mg/0.5 mL Nebu  Take 2.5 mg by nebulization every 4 (four) hours as needed (shortness of breath and wheezing). Rescue  What changed: reasons to take this            CONTINUE taking these medications      baclofen 10 MG tablet  Commonly known as: LIORESAL  Take 1 tablet (10 mg total) by mouth nightly as needed (msucle pain).     benzonatate 100 MG capsule  Commonly known as: TESSALON     cetirizine 10 MG tablet  Commonly known as: ZYRTEC     cyanocobalamin (vitamin B-12) 1,000 mcg Subl  Place 1,000 mcg under the tongue once daily.     ergocalciferol 50,000 unit Cap  Commonly known as: ERGOCALCIFEROL     ferrous sulfate 325 mg (65 mg iron) Tab tablet  Commonly known as: IRON  Take 1 tablet (325 mg total) by  mouth daily with breakfast.     fluticasone propionate 50 mcg/actuation nasal spray  Commonly known as: FLONASE     fluticasone-umeclidin-vilanter 100-62.5-25 mcg Dsdv  Commonly known as: TRELEGY ELLIPTA  Inhale 1 puff into the lungs once daily.     folic acid 1 MG tablet  Commonly known as: FOLVITE  Take 1 tablet (1 mg total) by mouth once daily.     furosemide 40 MG tablet  Commonly known as: LASIX  Take 1 tablet (40 mg total) by mouth once daily.     gabapentin 100 MG capsule  Commonly known as: NEURONTIN  Take 1 capsule (100 mg total) by mouth 3 (three) times daily.     LINZESS 145 mcg Cap capsule  Generic drug: linaCLOtide     lisinopriL-hydrochlorothiazide 20-12.5 mg per tablet  Commonly known as: PRINZIDE,ZESTORETIC  Take 2 tablets by mouth once daily.     loratadine 10 mg tablet  Commonly known as: CLARITIN  Take 1 tablet (10 mg total) by mouth once daily.     mesalamine 1.2 gram Tbec  Commonly known as: LIALDA     phentermine 37.5 MG capsule     RABEprazole 20 mg tablet  Commonly known as: ACIPHEX     rOPINIRole 0.5 MG tablet  Commonly known as: REQUIP  Take 1 tablet (0.5 mg total) by mouth every evening.     sertraline 100 MG tablet  Commonly known as: ZOLOFT  Take 2 tablets (200 mg total) by mouth once daily.     sucralfate 1 gram tablet  Commonly known as: CARAFATE            STOP taking these medications      acetaminophen 325 MG tablet  Commonly known as: TYLENOL     guaiFENesin-codeine 100-10 mg/5 ml  mg/5 mL syrup  Commonly known as: TUSSI-ORGANIDIN NR     nicotine 21 mg/24 hr  Commonly known as: NICODERM CQ     ondansetron 4 MG Tbdl  Commonly known as: ZOFRAN-ODT               Where to Get Your Medications        These medications were sent to [id number] - [place], [place]  [address] [facility] AT [facility] [address]  [address] [facility] [address]      Phone: [phone]   budesonide 0.25 mg/2 mL nebulizer solution  doxycycline 100 MG tablet  guaiFENesin 100  mg/5 ml 100 mg/5 mL syrup  melatonin 3 mg tablet  predniSONE 20 MG tablet       You can get these medications from any pharmacy    Bring a paper prescription for each of these medications  albuterol sulfate 2.5 mg/0.5 mL Nebu          Thank you  Misha Choudhary, PharmD

## 2023-12-31 NOTE — PLAN OF CARE
Problem: Adult Inpatient Plan of Care  Goal: Plan of Care Review  Outcome: Ongoing, Progressing  Goal: Patient-Specific Goal (Individualized)  Outcome: Ongoing, Progressing  Goal: Absence of Hospital-Acquired Illness or Injury  Outcome: Ongoing, Progressing  Goal: Optimal Comfort and Wellbeing  Outcome: Ongoing, Progressing  Goal: Readiness for Transition of Care  Outcome: Ongoing, Progressing     Problem: Bariatric Environmental Safety  Goal: Safety Maintained with Care  Outcome: Ongoing, Progressing     Problem: COPD (Chronic Obstructive Pulmonary Disease) Comorbidity  Goal: Maintenance of COPD Symptom Control  Outcome: Ongoing, Progressing     Problem: Skin Injury Risk Increased  Goal: Skin Health and Integrity  Outcome: Ongoing, Progressing     Problem: Fall Injury Risk  Goal: Absence of Fall and Fall-Related Injury  Outcome: Ongoing, Progressing

## 2023-12-31 NOTE — PLAN OF CARE
Pt is agreeable with discharge to home with [facility] services. Follow up appointments added for AVS. Pt is discharged with Home 02. ETanks x 2 ( both tanks full) delivered at bedside per Yvonne request. While at bedside DME Rep called pt and requested she call them before leaving hospital so he can meet her at home. Pt will be transported home by daughter Woodrow Mcclelland.   Future Appointments   Date Time Provider Department Center   1/9/2024 10:30 AM Arnulfo Chavarria RRT [facility] [address] [facility]   1/29/2024 10:30 AM Mike Mcdermott MD [facility]      12/31/23 1332   Final Note   Assessment Type Final Discharge Note   Anticipated Discharge Disposition Home-Health   What phone number can be called within the next 1-3 days to see how you are doing after discharge? [phone]   Hospital Resources/Appts/Education Provided Appointments scheduled and added to AVS   Post-Acute Status   Post-Acute Authorization Home Health  ([facility])   Home Health Status Set-up Complete/Auth obtained   Coverage BCBS   Discharge Delays None known at this time

## 2023-12-31 NOTE — PLAN OF CARE
[facility]      HOME HEALTH ORDERS  FACE TO FACE ENCOUNTER    Patient Name: Umu Clayton  YOB: 1960    PCP: Mike Mcdermott MD   PCP Address: [address] / [place] [address]  PCP Phone Number: [phone]  PCP Fax: [phone]    Encounter Date: 12/26/23    Admit to Home Health    Diagnoses:  Active Hospital Problems    Diagnosis  POA    *COPD with acute exacerbation [J44.1]  Yes     Chronic    Tobacco dependency [F17.200]  Yes    Restless leg syndrome [G25.81]  Yes    Acute on chronic respiratory failure with hypoxia [J96.21]  Yes    Severe obesity (BMI >= 40) [E66.01]  Yes    Hypertension [I10]  Yes    Anxiety [F41.9]  Yes    Smoker [F17.200]  Yes      Resolved Hospital Problems    Diagnosis Date Resolved POA    Wheezing [R06.2] 12/31/2023 Yes       Follow Up Appointments:  Future Appointments   Date Time Provider Department Center   1/29/2024 10:30 AM Mike Mcdermott MD [facility]       Allergies:  Review of patient's allergies indicates:   Allergen Reactions    Penicillins Hives       Medications: Review discharge medications with patient and family and provide education.    Current Facility-Administered Medications   Medication Dose Route Frequency Provider Last Rate Last Admin    acetaminophen tablet 650 mg  650 mg Oral Q8H PRN Bg Jin MD   650 mg at 12/27/23 2146    albuterol sulfate nebulizer solution 2.5 mg  2.5 mg Nebulization PRN Anais Mathews, NP        albuterol-ipratropium 2.5 mg-0.5 mg/3 mL nebulizer solution 3 mL  3 mL Nebulization Q4H PRN Charlie Lewis MD   3 mL at 12/29/23 0402    albuterol-ipratropium 2.5 mg-0.5 mg/3 mL nebulizer solution 3 mL  3 mL Nebulization Q4H Anais Mathews NP   3 mL at 12/31/23 0900    benzonatate capsule 100 mg  100 mg Oral TID Charlie Lewis MD   100 mg at 12/31/23 0845    budesonide nebulizer solution 0.25 mg  0.25 mg Nebulization Daily Reid,  Anais ROBERTS NP        butalbital-acetaminophen-caffeine -40 mg per tablet 1 tablet  1 tablet Oral Q4H PRN Anais Mathews NP   1 tablet at 12/28/23 0326    cetirizine tablet 10 mg  10 mg Oral Daily Charlie Lewis MD   10 mg at 12/31/23 0846    doxycycline tablet 100 mg  100 mg Oral Q12H Ann Marie Ray MD   100 mg at 12/31/23 0846    ferrous sulfate tablet 1 each  1 tablet Oral Daily Charlie Lewis MD   1 each at 12/31/23 0846    fluticasone furoate-vilanteroL 100-25 mcg/dose diskus inhaler 1 puff  1 puff Inhalation Daily Charlie Lewis MD   1 puff at 12/31/23 0900    fluticasone propionate 50 mcg/actuation nasal spray 50 mcg  1 spray Each Nostril BID Charlie Lewis MD   50 mcg at 12/31/23 0845    folic acid tablet 1 mg  1 mg Oral Daily Charlie Lewis MD   1 mg at 12/31/23 0846    gabapentin capsule 100 mg  100 mg Oral TID Charlie Lewis MD   100 mg at 12/31/23 0845    guaiFENesin 100 mg/5 ml syrup 200 mg  200 mg Oral Q4H PRN Anais Mathews NP   200 mg at 12/28/23 2133    lisinopriL tablet 20 mg  20 mg Oral Daily Ann Marie Ray MD   20 mg at 12/31/23 0846    And    hydroCHLOROthiazide tablet 12.5 mg  12.5 mg Oral Daily Ann Marie Ray MD   12.5 mg at 12/31/23 0845    HYDROcodone-acetaminophen 5-325 mg per tablet 1 tablet  1 tablet Oral Q6H PRN Charlie Lewis MD        melatonin tablet 9 mg  9 mg Oral Nightly PRN Charlie Lewis MD   9 mg at 12/30/23 2151    nicotine 21 mg/24 hr 1 patch  1 patch Transdermal Daily Anais Mathews NP   1 patch at 12/31/23 0848    nicotine 21 mg/24 hr 1 patch  1 patch Transdermal Daily Anais Mathews, NP   1 patch at 12/31/23 0848    ondansetron injection 4 mg  4 mg Intravenous Q12H PRN Charlie Lewis MD   4 mg at 12/29/23 0910    pantoprazole EC tablet 40 mg  40 mg Oral Daily Charlie Lewis MD   40 mg at 12/31/23 0846     polyethylene glycol packet 17 g  17 g Oral Daily Charlie Lewis MD   17 g at 12/31/23 0845    predniSONE tablet 40 mg  40 mg Oral Daily Anais Mathews NP   40 mg at 12/31/23 0845    promethazine (PHENERGAN) 6.25 mg in dextrose 5 % (D5W) 50 mL IVPB  6.25 mg Intravenous Q6H PRN Anais Mathews NP   Stopped at 12/30/23 1100    rOPINIRole tablet 0.5 mg  0.5 mg Oral QHS Charlie Lewis MD   0.5 mg at 12/30/23 2151    sertraline tablet 200 mg  200 mg Oral Daily Charlie Lewis MD   200 mg at 12/31/23 0845    sodium chloride 0.9% flush 3 mL  3 mL Intravenous PRN Charlie Lewis MD        sodium chloride 3% nebulizer solution 4 mL  4 mL Nebulization PRN Anais Mathews NP        sucralfate tablet 1 g  1 g Oral QID (AC & HS) Charlie Lewis MD   1 g at 12/31/23 1010     Current Discharge Medication List        CONTINUE these medications which have NOT CHANGED    Details   baclofen (LIORESAL) 10 MG tablet Take 1 tablet (10 mg total) by mouth nightly as needed (msucle pain).  Qty: 90 tablet, Refills: 5      benzonatate (TESSALON) 100 MG capsule Take 100 mg by mouth 3 (three) times daily.      cetirizine (ZYRTEC) 10 MG tablet Take 10 mg by mouth once daily.      cyanocobalamin, vitamin B-12, 1,000 mcg Subl Place 1,000 mcg under the tongue once daily.  Qty: 90 tablet, Refills: 3      ergocalciferol (ERGOCALCIFEROL) 50,000 unit Cap Vitamin D2 1,250 mcg (50,000 unit) capsule   Take 1 capsule every week by oral route.      ferrous sulfate (IRON) 325 mg (65 mg iron) Tab tablet Take 1 tablet (325 mg total) by mouth daily with breakfast.  Qty: 90 tablet, Refills: 3      fluticasone propionate (FLONASE) 50 mcg/actuation nasal spray 1 spray by Each Nostril route 2 (two) times daily.      fluticasone-umeclidin-vilanter (TRELEGY ELLIPTA) 100-62.5-25 mcg DsDv Inhale 1 puff into the lungs once daily.  Qty: 1 each, Refills: 11      folic acid (FOLVITE) 1 MG tablet Take 1  tablet (1 mg total) by mouth once daily.  Qty: 90 tablet, Refills: 3      furosemide (LASIX) 40 MG tablet Take 1 tablet (40 mg total) by mouth once daily.  Qty: 90 tablet, Refills: 3      gabapentin (NEURONTIN) 100 MG capsule Take 1 capsule (100 mg total) by mouth 3 (three) times daily.  Qty: 270 capsule, Refills: 11      LINZESS 145 mcg Cap capsule Take 145 mcg by mouth once daily.      lisinopriL-hydrochlorothiazide (PRINZIDE,ZESTORETIC) 20-12.5 mg per tablet Take 2 tablets by mouth once daily.  Qty: 180 tablet, Refills: 11    Comments: .      mesalamine (LIALDA) 1.2 gram TbEC Take 2.4 g by mouth.      phentermine 37.5 MG capsule Take 37.5 mg by mouth once daily.      RABEprazole (ACIPHEX) 20 mg tablet Take 20 mg by mouth once daily.      rOPINIRole (REQUIP) 0.5 MG tablet Take 1 tablet (0.5 mg total) by mouth every evening.  Qty: 90 tablet, Refills: 3      sertraline (ZOLOFT) 100 MG tablet Take 2 tablets (200 mg total) by mouth once daily.  Qty: 180 tablet, Refills: 3      sucralfate (CARAFATE) 1 gram tablet sucralfate 1 gram tablet      acetaminophen (TYLENOL) 325 MG tablet Take 2 tablets (650 mg total) by mouth every 4 (four) hours as needed for Pain.  Qty: 30 tablet, Refills: 0      albuterol sulfate 2.5 mg/0.5 mL Nebu Take 2.5 mg by nebulization every 4 (four) hours as needed. Rescue  Qty: 1 each, Refills: 1      guaiFENesin-codeine 100-10 mg/5 ml (TUSSI-ORGANIDIN NR)  mg/5 mL syrup Take 5 mLs by mouth every 8 (eight) hours as needed for Cough or Congestion.  Qty: 118 mL, Refills: 0      loratadine (CLARITIN) 10 mg tablet Take 1 tablet (10 mg total) by mouth once daily.  Qty: 60 tablet, Refills: 0      nicotine (NICODERM CQ) 21 mg/24 hr Place 1 patch onto the skin once daily.  Qty: 30 patch, Refills: 1    Comments: [employer] ([employer]) Member ID # [id number]      ondansetron (ZOFRAN-ODT) 4 MG TbDL DISSOLVE ONE TABLET IN MOUTH EVERY SIX HOURS AS NEEDED FOR NAUSEA  Qty: 9 tablet, Refills: 2                I have seen and examined this patient within the last 30 days. My clinical findings that support the need for the home health skilled services and home bound status are the following:no   Weakness/numbness causing balance and gait disturbance due to COPD Exacerbation and Weakness/Debility making it taxing to leave home.     Diet:   cardiac diet      Referrals/ Consults  Physical Therapy to evaluate and treat. Evaluate for home safety and equipment needs; Establish/upgrade home exercise program. Perform / instruct on therapeutic exercises, gait training, transfer training, and Range of Motion.  Occupational Therapy to evaluate and treat. Evaluate home environment for safety and equipment needs. Perform/Instruct on transfers, ADL training, ROM, and therapeutic exercises.   to evaluate for community resources/long-range planning.    Activities:   activity as tolerated    Nursing:   Agency to admit patient within 24 hours of hospital discharge unless specified on physician order or at patient request    SN to complete comprehensive assessment including routine vital signs. Instruct on disease process and s/s of complications to report to MD. Review/verify medication list sent home with the patient at time of discharge  and instruct patient/caregiver as needed. Frequency may be adjusted depending on start of care date.     Skilled nurse to perform up to 3 visits PRN for symptoms related to diagnosis    Notify MD if SBP > 160 or < 90; DBP > 90 or < 50; HR > 120 or < 50; Temp > 101; O2 < 88%    Ok to schedule additional visits based on staff availability and patient request on consecutive days within the home health episode.    When multiple disciplines ordered:    Start of Care occurs on Sunday - Wednesday schedule remaining discipline evaluations as ordered on separate consecutive days following the start of care.    Thursday SOC -schedule subsequent evaluations Friday and Monday the following week.      Friday - Saturday SOC - schedule subsequent discipline evaluations on consecutive days starting Monday of the following week.    For all post-discharge communication and subsequent orders please contact patient's primary care physician. If unable to reach primary care physician or do not receive response within 30 minutes, please contact  for clinical staff order clarification    Miscellaneous   Home Oxygen:  Oxygen at 3 L/min nasal canula to be used:  Continuously., Assess oxygen saturation via pulse oximeter as needed for increase in SOB., Notify physician if oxygen saturation less than 88%, and Consult respiratory therapy for instruction on home oxygen use    Home Health Aide:  Nursing Three times weekly, Physical Therapy Three times weekly, Occupational Therapy Three times weekly, Medical Social Work Weekly, and Respiratory Therapy Weekly      I certify that this patient is confined to her home and needs intermittent skilled nursing care, physical therapy, and occupational therapy.

## 2023-12-31 NOTE — PLAN OF CARE
12/31/23 1407   AVS Confirmation   Discharge instructions and AVS given to and reviewed with patient and/or significant other. Yes         AVS printed and handed to patient by bedside nurse. VN reviewed discharge instructions with patient using teachback method.  Allowed time for questions, all questions answered.  Patient verbalized complete understanding of discharge instructions and voices no concerns. Discharge instructions complete. Bedside nurse notified.

## 2023-12-31 NOTE — DISCHARGE SUMMARY
[facility] Medicine  Discharge Summary      Patient Name: Umu Clayton  MRN: [id number]  RAFAEL: [id number]  Patient Class: IP- Inpatient  Admission Date: 12/26/2023  Hospital Length of Stay: 3 days  Discharge Date and Time:  12/31/2023 11:57 AM  Attending Physician: Ann Marie Ray*   Discharging Provider: Anais Mathews NP  Primary Care Provider: Mike Mcdermott MD    Primary Care Team: Networked reference to record PCT     HPI:   Umu Clayton is a 63 yr old female with PMH of COPD, hypertension, anxiety, restless legs syndrome who presents with cough, headache, body aches.    Past few days she has had worsening body aches, headache, productive cough, wheezing/shortness of breath with the associated symptoms of hot and cold.  She reports recent sick contacts.  She presents for further evaluation and care.    Triage vitals were significant for tachycardia, tachypnea, hypoxia 90%.  CBC was remarkable normocytic anemia.  CMP was remarkable for slightly elevated glucose and slightly lower calcium levels.    Troponin was negative.    CTA was unremarkable for PE.  Chest x-ray was underinflated but no obvious consolidation.  Influezna was negative.    Patient admitted for possible COPD exacerbation    * No surgery found *      Hospital Course:    She was admitted to the hospital medicine service for further care.  Noted with a COPD exacerbation on admission.  Patient does not use O2.  Chest x-ray with no obvious consolidation.  She was influenza negative.  Negative PE on CTA.  She was started on nebs, steroids, and supplemental O2.  Notable wheezing noted on exam.  She was given Mag riders on yesterday, 12/27/2023, for wheezing.  Wheezing has not improved, she was switched from oral steroids to IV Solu-Medrol.  Because of poor clinical presentation, she was started doxycycline empirically.  Still noted with wheezing on exam.  In addition to doxycycline we will give azithromycin.   Will add Pulmicort to regimen.  CT chest--Patchy heterogeneous ground-glass airspace opacities with areas of focal reticulation most notably involving the right upper lobe.  Additional scattered centrilobular nodules, many of which demonstrate tree-in-bud distribution.  Findings are new from comparison CT 08/01/2023, and may represent evolving nonspecific infectious or non infectious inflammatory process.  Correlation is advised.  Consider short-term interval follow-up imaging to ensure resolution of these findings. Mosaic attenuation about the lung bases.  Findings can be seen in the setting of air trapping disease as well as elevated pulmonary pressures.Atelectasis involving the left lung base as well as scarring at the lingula. Mild centrilobular emphysematous change with upper lung zone predominance.   12/31- she is doing well this am. No wheezing noted. She will need DC with home O2 at 3 L NC on today. Will order an ambulatory referral to Pulm outpatient. HH was ordered. Also will need to follow up with PCP.      Goals of Care Treatment Preferences:  Code Status: Full Code      Consults:   Consults (From admission, onward)          Status Ordering Provider     Inpatient virtual consult to Hospital Medicine  Once        Provider:  (Not yet assigned)    KELIN Gallegos            No new Assessment & Plan notes have been filed under this hospital service since the last note was generated.  Service: Hospital Medicine    Final Active Diagnoses:    Diagnosis Date Noted POA    PRINCIPAL PROBLEM:  COPD with acute exacerbation [J44.1] 06/30/2021 Yes     Chronic    Tobacco dependency [F17.200] 12/28/2023 Yes    Restless leg syndrome [G25.81] 12/27/2023 Yes    Acute on chronic respiratory failure with hypoxia [J96.21] 07/02/2021 Yes    Severe obesity (BMI >= 40) [E66.01] 06/30/2021 Yes    Hypertension [I10] 06/30/2021 Yes    Anxiety [F41.9] 06/30/2021 Yes    Smoker [F17.200] 06/30/2021 Yes     "  Problems Resolved During this Admission:    Diagnosis Date Noted Date Resolved POA    Wheezing [R06.2] 07/01/2021 12/31/2023 Yes       Discharged Condition: stable    Disposition: Home or Self Care    Follow Up:   Follow-up Information       Mike Mcdermott MD Follow up on 1/29/2024.    Specialty: Family Medicine  Why: [employer]pointment scheduled for 10:30 AM  Contact information:  [address]  [address]  [facility] [address]  [phone]               Jono  [facility] Follow up on 1/3/2024.    Why: Nurse will come out o admit on January 3rd (Wednesday)  Contact information:  [address] [address] [phone]             Dme, [facility] Follow up.    Specialty: DME Provider  Why: Will provide concentrator for home use.  Contact information:  [address]  [facility] [address]  [phone]                           Patient Instructions:      OXYGEN FOR HOME USE     Order Specific Question Answer Comments   Liter Flow 3    Duration Continuous    Qualifying Test Performed at: Activity    Oxygen saturation at rest 91    Oxygen saturation with activity 82    Oxygen saturation with activity on oxygen 85    Portable mode: continuous    Route nasal cannula    Device: home concentrator with portable tanks    Length of need (in months): 99 mos    Patient condition with qualifying saturation COPD    Height: 5' 7" (1.702 m)    Weight: 136.9 kg (301 lb 13 oz)    Alternative treatment measures have been tried or considered and deemed clinically ineffective. Yes      Ambulatory referral/consult to Smoking Cessation Program   Standing Status: Future   Referral Priority: Routine Referral Type: Consultation   Referral Reason: Specialty Services Required   Requested Specialty: CTTS   Number of Visits Requested: 1     Ambulatory referral/consult to Pulmonology   Standing Status: Future   Referral Priority: Routine Referral Type: Consultation   Referral Reason: " Specialty Services Required   Requested Specialty: Pulmonary Disease   Number of Visits Requested: 1     Notify your health care provider if you experience any of the following:  temperature >100.4     Notify your health care provider if you experience any of the following:  persistent nausea and vomiting or diarrhea     Notify your health care provider if you experience any of the following:  severe uncontrolled pain     Notify your health care provider if you experience any of the following:  redness, tenderness, or signs of infection (pain, swelling, redness, odor or green/yellow discharge around incision site)     Notify your health care provider if you experience any of the following:  severe persistent headache     Notify your health care provider if you experience any of the following:  difficulty breathing or increased cough     Notify your health care provider if you experience any of the following:  worsening rash     Notify your health care provider if you experience any of the following:  persistent dizziness, light-headedness, or visual disturbances     Notify your health care provider if you experience any of the following:  increased confusion or weakness     Activity as tolerated       Significant Diagnostic Studies: Labs: All labs within the past 24 hours have been reviewed    Pending Diagnostic Studies:       None           Medications:  Reconciled Home Medications:      Medication List        START taking these medications      budesonide 0.25 mg/2 mL nebulizer solution  Commonly known as: PULMICORT  Take 2 mLs (0.25 mg total) by nebulization daily as needed (shortness of breath and wheezing). Controller     doxycycline 100 MG tablet  Commonly known as: VIBRA-TABS  Take 1 tablet (100 mg total) by mouth every 12 (twelve) hours. for 8 days     guaiFENesin 100 mg/5 ml 100 mg/5 mL syrup  Commonly known as: ROBITUSSIN  Take 10 mLs (200 mg total) by mouth every 4 (four) hours as needed for Congestion.      melatonin 3 mg tablet  Commonly known as: MELATIN  Take 2 tablets (6 mg total) by mouth nightly.     predniSONE 20 MG tablet  Commonly known as: DELTASONE  Take 2 tablets (40 mg total) by mouth once daily. for 5 days  Start taking on: January 1, 2024            CHANGE how you take these medications      albuterol sulfate 2.5 mg/0.5 mL Nebu  Take 2.5 mg by nebulization every 4 (four) hours as needed (shortness of breath and wheezing). Rescue  What changed: reasons to take this            CONTINUE taking these medications      baclofen 10 MG tablet  Commonly known as: LIORESAL  Take 1 tablet (10 mg total) by mouth nightly as needed (msucle pain).     benzonatate 100 MG capsule  Commonly known as: TESSALON  Take 100 mg by mouth 3 (three) times daily.     cetirizine 10 MG tablet  Commonly known as: ZYRTEC  Take 10 mg by mouth once daily.     cyanocobalamin (vitamin B-12) 1,000 mcg Subl  Place 1,000 mcg under the tongue once daily.     ergocalciferol 50,000 unit Cap  Commonly known as: ERGOCALCIFEROL  Vitamin D2 1,250 mcg (50,000 unit) capsule   Take 1 capsule every week by oral route.     ferrous sulfate 325 mg (65 mg iron) Tab tablet  Commonly known as: IRON  Take 1 tablet (325 mg total) by mouth daily with breakfast.     fluticasone propionate 50 mcg/actuation nasal spray  Commonly known as: FLONASE  1 spray by Each Nostril route 2 (two) times daily.     fluticasone-umeclidin-vilanter 100-62.5-25 mcg Dsdv  Commonly known as: TRELEGY ELLIPTA  Inhale 1 puff into the lungs once daily.     folic acid 1 MG tablet  Commonly known as: FOLVITE  Take 1 tablet (1 mg total) by mouth once daily.     furosemide 40 MG tablet  Commonly known as: LASIX  Take 1 tablet (40 mg total) by mouth once daily.     gabapentin 100 MG capsule  Commonly known as: NEURONTIN  Take 1 capsule (100 mg total) by mouth 3 (three) times daily.     LINZESS 145 mcg Cap capsule  Generic drug: linaCLOtide  Take 145 mcg by mouth once daily.      lisinopriL-hydrochlorothiazide 20-12.5 mg per tablet  Commonly known as: PRINZIDE,ZESTORETIC  Take 2 tablets by mouth once daily.     loratadine 10 mg tablet  Commonly known as: CLARITIN  Take 1 tablet (10 mg total) by mouth once daily.     mesalamine 1.2 gram Tbec  Commonly known as: LIALDA  Take 2.4 g by mouth.     phentermine 37.5 MG capsule  Take 37.5 mg by mouth once daily.     RABEprazole 20 mg tablet  Commonly known as: ACIPHEX  Take 20 mg by mouth once daily.     rOPINIRole 0.5 MG tablet  Commonly known as: REQUIP  Take 1 tablet (0.5 mg total) by mouth every evening.     sertraline 100 MG tablet  Commonly known as: ZOLOFT  Take 2 tablets (200 mg total) by mouth once daily.     sucralfate 1 gram tablet  Commonly known as: CARAFATE  sucralfate 1 gram tablet            STOP taking these medications      acetaminophen 325 MG tablet  Commonly known as: TYLENOL     guaiFENesin-codeine 100-10 mg/5 ml  mg/5 mL syrup  Commonly known as: TUSSI-ORGANIDIN NR     nicotine 21 mg/24 hr  Commonly known as: NICODERM CQ     ondansetron 4 MG Tbdl  Commonly known as: ZOFRAN-ODT              Indwelling Lines/Drains at time of discharge:   Lines/Drains/Airways       None                   Time spent on the discharge of patient: 45 minutes         Anais Mathews NP  Department of Hospital Medicine  [facility] - Telemetry

## 2023-12-31 NOTE — NURSING
Home Oxygen Evaluation    Date Performed: 2023    1) Patient's Home O2 Sat on room air, while at rest: 91%        If O2 sats on room air at rest are 88% or below, patient qualifies. No additional testing needed. Document N/A in steps 2 and 3. If 89% or above, complete steps 2.      2) Patient's O2 Sat on room air while exercisin%        If O2 sats on room air while exercising remain 89% or above patient does not qualify, no further testing needed Document N/A in step 3. If O2 sats on room air while exercising are 88% or below, continue to step 3.      3) Patient's O2 Sat while exercising on O2: 85% at 2.5 LPM         (Must show improvement from #2 for patients to qualify)    If O2 sats improve on oxygen, patient qualifies for portable oxygen. If not, the patient does not qualify.

## 2024-01-01 LAB
BACTERIA SPEC AEROBE CULT: ABNORMAL
BACTERIA SPEC AEROBE CULT: ABNORMAL
GRAM STN SPEC: ABNORMAL

## 2024-01-02 ENCOUNTER — PATIENT OUTREACH (OUTPATIENT)
Dept: ADMINISTRATIVE | Facility: CLINIC | Age: 64
End: 2024-01-02
Payer: COMMERCIAL

## 2024-01-02 NOTE — PROGRESS NOTES
C3 nurse spoke with Umu Clayton for a TCC post hospital discharge follow up call. The patient has a scheduled .HOSPFU Dr Mcdermott 1/12/24 130pm added to wailtist

## 2024-01-03 DIAGNOSIS — J44.1 COPD WITH ACUTE EXACERBATION: Primary | Chronic | ICD-10-CM

## 2024-01-10 NOTE — TELEPHONE ENCOUNTER
No care due was identified.  [facility] Embedded Care Due Messages. Reference number: [id number].   1/10/2024 11:08:45 AM CST

## 2024-01-12 ENCOUNTER — PATIENT OUTREACH (OUTPATIENT)
Dept: ADMINISTRATIVE | Facility: HOSPITAL | Age: 64
End: 2024-01-12
Payer: COMMERCIAL

## 2024-01-12 ENCOUNTER — OFFICE VISIT (OUTPATIENT)
Dept: FAMILY MEDICINE | Facility: CLINIC | Age: 64
End: 2024-01-12
Payer: COMMERCIAL

## 2024-01-12 VITALS
DIASTOLIC BLOOD PRESSURE: 84 MMHG | OXYGEN SATURATION: 92 % | SYSTOLIC BLOOD PRESSURE: 116 MMHG | WEIGHT: 287.94 LBS | HEIGHT: 67 IN | HEART RATE: 96 BPM | BODY MASS INDEX: 45.19 KG/M2

## 2024-01-12 DIAGNOSIS — I10 HYPERTENSION, UNSPECIFIED TYPE: ICD-10-CM

## 2024-01-12 DIAGNOSIS — F17.200 TOBACCO DEPENDENCE: ICD-10-CM

## 2024-01-12 DIAGNOSIS — J44.9 CHRONIC OBSTRUCTIVE PULMONARY DISEASE, UNSPECIFIED COPD TYPE: Primary | ICD-10-CM

## 2024-01-12 DIAGNOSIS — R73.03 PREDIABETES: ICD-10-CM

## 2024-01-12 DIAGNOSIS — Z12.31 BREAST CANCER SCREENING BY MAMMOGRAM: ICD-10-CM

## 2024-01-12 DIAGNOSIS — K51.919 ULCERATIVE COLITIS WITH COMPLICATION, UNSPECIFIED LOCATION: ICD-10-CM

## 2024-01-12 DIAGNOSIS — J44.1 COPD EXACERBATION: ICD-10-CM

## 2024-01-12 PROBLEM — K51.90 ULCERATIVE COLITIS: Status: ACTIVE | Noted: 2024-01-12

## 2024-01-12 PROBLEM — E66.01 SEVERE OBESITY (BMI >= 40): Status: RESOLVED | Noted: 2021-06-30 | Resolved: 2024-01-12

## 2024-01-12 PROCEDURE — 99215 OFFICE O/P EST HI 40 MIN: CPT | Mod: S$GLB,,, | Performed by: FAMILY MEDICINE

## 2024-01-12 PROCEDURE — 4010F ACE/ARB THERAPY RXD/TAKEN: CPT | Mod: CPTII,S$GLB,, | Performed by: FAMILY MEDICINE

## 2024-01-12 PROCEDURE — 1159F MED LIST DOCD IN RCRD: CPT | Mod: CPTII,S$GLB,, | Performed by: FAMILY MEDICINE

## 2024-01-12 PROCEDURE — 3079F DIAST BP 80-89 MM HG: CPT | Mod: CPTII,S$GLB,, | Performed by: FAMILY MEDICINE

## 2024-01-12 PROCEDURE — 99999 PR PBB SHADOW E&M-EST. PATIENT-LVL V: CPT | Mod: PBBFAC,,, | Performed by: FAMILY MEDICINE

## 2024-01-12 PROCEDURE — 3074F SYST BP LT 130 MM HG: CPT | Mod: CPTII,S$GLB,, | Performed by: FAMILY MEDICINE

## 2024-01-12 PROCEDURE — 1111F DSCHRG MED/CURRENT MED MERGE: CPT | Mod: CPTII,S$GLB,, | Performed by: FAMILY MEDICINE

## 2024-01-12 PROCEDURE — 3008F BODY MASS INDEX DOCD: CPT | Mod: CPTII,S$GLB,, | Performed by: FAMILY MEDICINE

## 2024-01-12 RX ORDER — FLUTICASONE FUROATE, UMECLIDINIUM BROMIDE AND VILANTEROL TRIFENATATE 200; 62.5; 25 UG/1; UG/1; UG/1
1 POWDER RESPIRATORY (INHALATION) DAILY
Qty: 60 EACH | Refills: 11 | Status: SHIPPED | OUTPATIENT
Start: 2024-01-12

## 2024-01-12 RX ORDER — SEMAGLUTIDE 0.25 MG/.5ML
0.25 INJECTION, SOLUTION SUBCUTANEOUS
Qty: 4 EACH | Refills: 11 | Status: SHIPPED | OUTPATIENT
Start: 2024-01-12

## 2024-01-12 NOTE — PATIENT INSTRUCTIONS
Increase trelegy to the 200 formula, 1 puff daily    Albuterol if needed    Continue to follow oxygen levels    Lung dr appt on 1/31/23.

## 2024-01-12 NOTE — PROGRESS NOTES
(Portions of this note were dictated using voice recognition software and may contain dictation related errors in spelling/grammar/syntax not found on text review)    CC:   Chief Complaint   Patient presents with    Follow-up     Hospital-Due to low oxygen     Shortness of Breath    Abdominal Pain     Lower bilateral         HPI: 63 y.o. female      Hospital follow-up   Admitted 12/26/2023, discharged 12/31/2023.  Presented for worsening body aches, headache, productive cough, wheezing and shortness a breath.  Had tachycardia, tachypnea, pulse ox was 90%.  O2 with activity was 82  CTA was negative for PE.  Chest x-ray showed no obvious consolidation.  Flu negative.  COVID negative.  Admitted for COPD exacerbation.  Given supplemental O2, steroids, doxycycline and azithromycin.  Subsequent chest CT was done showing patchy heterogeneous ground-glass airspace opacities with focal articulation most notably in the right upper lobe.  Mosaic attenuation about the lung bases.  Findings can be seen in the setting of air trapping disease as well as elevated pulmonary pressures.Atelectasis involving the left lung base as well as scarring at the lingula.Mild centrilobular emphysematous change with upper lung zone predominance.    She was discharged on supplemental O2 3 L nasal cannula, was ordered pulmonary referral and home health (pulmonology appointment scheduled on 01/31/2024).  Prior PFT in 2021:FEV1 was 60% of predicted, FVC was 66% of predicted, ratio was 72.  FEF 25-75% was 44.8% predicted  DLCO was 58% predicted .  Updated PFT was ordered, pending    Maintenance COPD regimen: Trelegy 100.  Pulmicort was added to her therapy in-hospital            Hypertension lisinopril hydrochlorothiazide 20/12.5 mg, 2 pills daily , furosemide 40 mg daily.     Anxiety on sertraline 200 mg daily    Ulcerative colitis, chronic constipation,Peptic ulcer disease, on AcipHex, sucralfate.  Sees GI doctor in [place] , Dr. Clemons)  On  mesalamine and Linzess      Restless legs and leg cramps, better with gabapentin and baclofen and Requip at night     About G LP 1 receptor agonist given weight and prediabetes     Past Medical History:   Diagnosis Date    Anxiety     COPD (chronic obstructive pulmonary disease)     GERD (gastroesophageal reflux disease)     History of stomach ulcers     Hypertension     Restless leg syndrome     Ulcerative colitis        Past Surgical History:   Procedure Laterality Date    gastric by pass      HERNIA REPAIR      HYSTERECTOMY      OOPHORECTOMY Right        Family History   Problem Relation Age of Onset    COPD Father     Lung cancer Father     Colon cancer Maternal Grandmother         Colon cancer    COPD Mother             Multiple sclerosis Son     Heart disease Neg Hx     Stroke Neg Hx        Social History     Tobacco Use    Smoking status: Every Day     Current packs/day: 2.50     Average packs/day: 2.5 packs/day for 49.0 years (122.6 ttl pk-yrs)     Types: Cigarettes     Start date:     Smokeless tobacco: Never    Tobacco comments:     Enrolled in the [employer] on 21 ([facility] Member ID # [id number]). Ambulatory referral to Smoking Cessation program.    Substance Use Topics    Alcohol use: Never    Drug use: No       Lab Results   Component Value Date    WBC 7.23 2023    HGB 11.6 (L) 2023    HCT 37.5 2023    MCV 86 2023     2023    CHOL 148 2023    TRIG 83 2023    HDL 56 2023    ALT 17 2023    AST 19 2023    BILITOT 0.4 2023    ALKPHOS 86 2023     2023    K 3.7 2023     2023    CREATININE 0.8 2023    ESTGFRAFRICA >60 2021    EGFRNONAA >60 2021    CALCIUM 8.9 2023    ALBUMIN 3.5 2023    BUN 26 (H) 2023    CO2 26 2023    TSH 2.410 2023    HGBA1C 5.9 (H) 2023    LDLCALC 75.4 2023    GLU 99 2023       Hemoglobin (g/dL)  "  Date Value   12/31/2023 11.6 (L)   12/30/2023 10.6 (L)   12/29/2023 10.6 (L)   12/28/2023 11.0 (L)   12/27/2023 11.4 (L)   12/26/2023 11.7 (L)   06/30/2023 10.3 (L)   06/08/2023 10.2 (L)   09/23/2022 10.6 (L)   08/26/2021 12.9           Vital signs reviewed  Vitals:    01/12/24 1332   BP: 116/84   BP Location: Right arm   Patient Position: Sitting   BP Method: Medium (Manual)   Pulse: 96   SpO2: (!) 92%   Weight: 130.6 kg (287 lb 14.7 oz)   Height: 5' 7" (1.702 m)         Wt Readings from Last 10 Encounters:   01/12/24 130.6 kg (287 lb 14.7 oz)   12/29/23 (!) 136.9 kg (301 lb 13 oz)   06/28/23 (!) 139.5 kg (307 lb 8.7 oz)   06/08/23 (!) 141.1 kg (311 lb)   09/23/22 (!) 138.8 kg (306 lb)   10/29/21 125.2 kg (276 lb)   09/22/21 125.2 kg (276 lb 0.3 oz)   08/26/21 128.3 kg (282 lb 13.6 oz)   07/01/21 127.5 kg (281 lb 1.4 oz)   09/17/20 127 kg (280 lb)       PE:   APPEARANCE: Well nourished, well developed, in no acute distress.    HEAD: Normocephalic, atraumatic.  NECK: Supple with no cervical lymphadenopathy.    CHEST: Good inspiratory effort.  Mildly decreased breath sounds, very slight wheezing on right posterior lung field  CARDIOVASCULAR: Normal S1, S2. No rubs, murmurs, or gallops.  ABDOMEN: Bowel sounds normal. Not distended. Soft. No tenderness or masses. No organomegaly.  EXTREMITIES:  No edema    IMPRESSION  1. Chronic obstructive pulmonary disease, unspecified COPD type    2. COPD exacerbation    3. Hypertension, unspecified type    4. Tobacco dependence    5. Ulcerative colitis with complication, unspecified location    6. Breast cancer screening by mammogram    7. BMI 45.0-49.9, adult    8. Prediabetes                PLAN  Hypertension , stable.  Continue current therapy    Anxiety  Continue sertraline    COPD with restrictive lung disease, recent COPD exacerbation s/p abx and steroids, on supplemental O2 from hospital     Increase Trelegy to 200, 1 puff daily needed for breakthrough symptoms.  Enrolled " with smoking cessation  Pulm appt pending, PFT's pending.     Anemia, trends as above.  Denies any bleeding concerns.  She sees outside GI, colonoscopy done late 2023     UC: Continue cardiology follow-up     Will try Wegovy to help with weight loss.  Was prior on phentermine but would not recommend this given her hypertension.  Will send over an 0.25 mg weekly in case her insurance will cover this.  Weight loss should benefit her hypertension control, pulmonary status, diabetes risk    Can follow-up around June of 2024 for recheck or sooner if needed.              Orders Placed This Encounter   Procedures    Mammo Digital Screening Bilat       Total time spent including face-to-face time and chart review and documentation time:  43 min      SCREENINGS       Immunizations:   COVID vaccine has not had:  counseling provided prior.   Ppsv23: 2021  Tdap 08/26/2021    Flu utd    Age/demographic appropriate health maintenance:  Colon cancer screening 2023 :  Sees outside GI (will send records) Dr. Clemons ([facility])  Mammogram  09/21/2021 rpt ordered.

## 2024-01-12 NOTE — PROGRESS NOTES
Care Everywhere updates requested and reviewed.  Immunizations reconciled. Media reports reviewed.  Duplicate HM overrides and  orders removed.  Overdue HM topic chart audit and/or requested.       Health Maintenance Due   Topic Date Due    COVID-19 Vaccine (1) Never done    Colorectal Cancer Screening  Never done    Shingles Vaccine (1 of 2) Never done    RSV Vaccine (Age 60+ and Pregnant patients) (1 - 1-dose 60+ series) Never done    Mammogram  2022    Pneumococcal Vaccines (Age 0-64) (2 - PCV) 2022

## 2024-01-29 DIAGNOSIS — J44.1 COPD WITH ACUTE EXACERBATION: Primary | ICD-10-CM

## 2024-01-31 ENCOUNTER — HOSPITAL ENCOUNTER (OUTPATIENT)
Dept: PULMONOLOGY | Facility: CLINIC | Age: 64
Discharge: HOME | End: 2024-01-31
Payer: COMMERCIAL

## 2024-01-31 ENCOUNTER — OFFICE VISIT (OUTPATIENT)
Dept: PULMONOLOGY | Facility: CLINIC | Age: 64
End: 2024-01-31
Payer: COMMERCIAL

## 2024-01-31 VITALS — HEIGHT: 67 IN | HEART RATE: 80 BPM | BODY MASS INDEX: 45.4 KG/M2 | WEIGHT: 289.25 LBS | OXYGEN SATURATION: 94 %

## 2024-01-31 DIAGNOSIS — J44.1 COPD WITH ACUTE EXACERBATION: ICD-10-CM

## 2024-01-31 DIAGNOSIS — J43.2 CENTRILOBULAR EMPHYSEMA: Primary | ICD-10-CM

## 2024-01-31 DIAGNOSIS — R06.09 DOE (DYSPNEA ON EXERTION): ICD-10-CM

## 2024-01-31 DIAGNOSIS — E66.2 CLASS 3 OBESITY WITH ALVEOLAR HYPOVENTILATION, SERIOUS COMORBIDITY, AND BODY MASS INDEX (BMI) OF 45.0 TO 49.9 IN ADULT: ICD-10-CM

## 2024-01-31 DIAGNOSIS — F17.210 CIGARETTE NICOTINE DEPENDENCE WITHOUT COMPLICATION: ICD-10-CM

## 2024-01-31 PROBLEM — E66.813 CLASS 3 OBESITY WITH ALVEOLAR HYPOVENTILATION, SERIOUS COMORBIDITY, AND BODY MASS INDEX (BMI) OF 45.0 TO 49.9 IN ADULT: Status: ACTIVE | Noted: 2024-01-31

## 2024-01-31 LAB
DLCO SINGLE BREATH LLN: 18.58
DLCO SINGLE BREATH PRE REF: 59.5 %
DLCO SINGLE BREATH REF: 24.31
DLCOC SBVA LLN: 3.14
DLCOC SBVA REF: 4.47
DLCOC SINGLE BREATH LLN: 18.58
DLCOC SINGLE BREATH REF: 24.31
DLCOCSBVAULN: 5.8
DLCOCSINGLEBREATHULN: 30.05
DLCOSINGLEBREATHULN: 30.05
DLCOVA LLN: 3.14
DLCOVA PRE REF: 80 %
DLCOVA PRE: 3.57 ML/(MIN*MMHG*L) (ref 3.14–5.8)
DLCOVA REF: 4.47
DLCOVAULN: 5.8
ERV LLN: -16449.2
ERV PRE REF: 42.2 %
ERV REF: 0.8
ERVULN: ABNORMAL
FEF 25 75 LLN: 1.15
FEF 25 75 PRE REF: 52.3 %
FEF 25 75 REF: 2.3
FET100 CHG: -6.1 %
FEV05 LLN: 1.1
FEV05 REF: 1.96
FEV1 CHG: -2.8 %
FEV1 FVC LLN: 66
FEV1 FVC PRE REF: 91 %
FEV1 FVC REF: 79
FEV1 LLN: 2
FEV1 PRE REF: 68.5 %
FEV1 REF: 2.67
FEV1 VOL CHG: -0.05
FRCPLETH LLN: 2.05
FRCPLETH PREREF: 77.6 %
FRCPLETH REF: 2.87
FRCPLETHULN: 3.7
FVC CHG: -6.5 %
FVC LLN: 2.57
FVC PRE REF: 74.7 %
FVC REF: 3.42
FVC VOL CHG: -0.17
IVC PRE: 2.59 L (ref 2.57–4.31)
IVC SINGLE BREATH LLN: 2.57
IVC SINGLE BREATH PRE REF: 75.7 %
IVC SINGLE BREATH REF: 3.42
IVCSINGLEBREATHULN: 4.31
LLN IC: -16447.64
PEF LLN: 4.78
PEF PRE REF: 67.6 %
PEF REF: 6.65
PHYSICIAN COMMENT: ABNORMAL
POST FEF 25 75: 1.33 L/S (ref 1.15–3.87)
POST FET 100: 7.25 SEC
POST FEV1 FVC: 74.34 % (ref 66.38–89.12)
POST FEV1: 1.78 L (ref 2–3.31)
POST FEV5: 1.42 L (ref 1.1–2.81)
POST FVC: 2.39 L (ref 2.57–4.31)
POST PEF: 4.35 L/S (ref 4.78–8.53)
PRE DLCO: 14.48 ML/(MIN*MMHG) (ref 18.58–30.05)
PRE ERV: 0.34 L (ref -16449.2–16450.8)
PRE FEF 25 75: 1.21 L/S (ref 1.15–3.87)
PRE FET 100: 7.72 SEC
PRE FEV05 REF: 74 %
PRE FEV1 FVC: 71.54 % (ref 66.38–89.12)
PRE FEV1: 1.83 L (ref 2–3.31)
PRE FEV5: 1.45 L (ref 1.1–2.81)
PRE FRC PL: 2.23 L (ref 2.05–3.7)
PRE FVC: 2.56 L (ref 2.57–4.31)
PRE IC: 2.25 L (ref -16447.64–16452.36)
PRE PEF: 4.5 L/S (ref 4.78–8.53)
PRE REF IC: 95.5 %
PRE RV: 1.89 L (ref 1.5–2.65)
PRE TLC: 4.48 L (ref 4.45–6.43)
RAW PRE REF: 116.8 %
RAW PRE: 3.57 CMH2O*S/L (ref 3.06–3.06)
RAW REF: 3.06
REF IC: 2.36
RV LLN: 1.5
RV PRE REF: 91.3 %
RV REF: 2.07
RVTLC LLN: 30
RVTLC PRE REF: 105.4 %
RVTLC PRE: 42.2 % (ref 30.45–49.63)
RVTLC REF: 40
RVTLCULN: 50
RVULN: 2.65
SGAW PRE REF: 99 %
SGAW PRE: 0.1 1/(CMH2O*S) (ref 0.1–0.1)
SGAW REF: 0.1
TLC LLN: 4.45
TLC PRE REF: 82.4 %
TLC REF: 5.44
TLC ULN: 6.43
ULN IC: ABNORMAL
VA PRE: 4.05 L (ref 5.29–5.29)
VA SINGLE BREATH LLN: 5.29
VA SINGLE BREATH PRE REF: 76.6 %
VA SINGLE BREATH REF: 5.29
VASINGLEBREATHULN: 5.29
VC LLN: 2.57
VC PRE REF: 75.7 %
VC PRE: 2.59 L (ref 2.57–4.31)
VC REF: 3.42
VC ULN: 4.31

## 2024-01-31 PROCEDURE — 99999 PR PBB SHADOW E&M-EST. PATIENT-LVL V: CPT | Mod: PBBFAC,,, | Performed by: INTERNAL MEDICINE

## 2024-01-31 PROCEDURE — 1159F MED LIST DOCD IN RCRD: CPT | Mod: CPTII,S$GLB,, | Performed by: INTERNAL MEDICINE

## 2024-01-31 PROCEDURE — 4010F ACE/ARB THERAPY RXD/TAKEN: CPT | Mod: CPTII,S$GLB,, | Performed by: INTERNAL MEDICINE

## 2024-01-31 PROCEDURE — 99204 OFFICE O/P NEW MOD 45 MIN: CPT | Mod: S$GLB,,, | Performed by: INTERNAL MEDICINE

## 2024-01-31 PROCEDURE — 3008F BODY MASS INDEX DOCD: CPT | Mod: CPTII,S$GLB,, | Performed by: INTERNAL MEDICINE

## 2024-01-31 RX ORDER — IPRATROPIUM BROMIDE AND ALBUTEROL SULFATE 2.5; .5 MG/3ML; MG/3ML
3 SOLUTION RESPIRATORY (INHALATION) EVERY 6 HOURS PRN
Qty: 180 ML | Refills: 2 | Status: SHIPPED | OUTPATIENT
Start: 2024-01-31 | End: 2025-01-30

## 2024-01-31 NOTE — PROGRESS NOTES
H&P Note  [facility]    SUBJECTIVE:     Chief Complaint:   Dyspnea on exertion    History of Present Illness/Interval History:  Patient is a 63 year-old female here as a hospital follow up. She has a history of emphysema, obesity, and HTN. She takes lasix daily. She was hospitalized between -2023 for respiratory failure. She was treated with IV steriods and nebulizer treatments. She was discharged with home oxygen and doxycycline. She is no longer using oxygen and she finished her antibiotic treatment.     She is taking trelegy for maintenance therapy. She has an albuterol rescue inhaler that she has used once since discharge. She has a nebulizer machine at home. She needs refills.     She is short of breath at rest and on exertion. She has daily dry cough. She has intermittent wheezing.     She 4 puppies that live at home.     No asbestos exposure.     Additional Pulmonary History:  Childhood Illnesses:  None   Environmental:  None   Tobacco/Smoking:  Current cigarette smoker. 1PPD. She started at 15 years old. She quit once for 7 years. She met with smoking cessation in the hospital. She is ready to contact them. She did not like chantix. She has used nicotine replacement patches in the past.     Review of patient's allergies indicates:   Allergen Reactions    Penicillins Hives       Past Medical History:   Diagnosis Date    Anxiety     Emphysema lung     GERD (gastroesophageal reflux disease)     History of stomach ulcers     Hypertension     Restless leg syndrome     Ulcerative colitis      Past Surgical History:   Procedure Laterality Date    gastric by pass      HERNIA REPAIR      HYSTERECTOMY      OOPHORECTOMY Right      Family History   Problem Relation Age of Onset    COPD Father     Lung cancer Father     Colon cancer Maternal Grandmother         Colon cancer    COPD Mother             Multiple sclerosis Son     Heart disease Neg Hx     Stroke Neg Hx      Social History      Socioeconomic History    Marital status:    Tobacco Use    Smoking status: Every Day     Current packs/day: 2.50     Average packs/day: 2.5 packs/day for 49.1 years (122.7 ttl pk-yrs)     Types: Cigarettes     Start date: 1975    Smokeless tobacco: Never    Tobacco comments:     Enrolled in the [employer] on 7/1/21 ([employer] Member ID # [id number]). Ambulatory referral to Smoking Cessation program.    Substance and Sexual Activity    Alcohol use: Never    Drug use: No    Sexual activity: Yes     Partners: Male     Birth control/protection: None     Social Determinants of Health     Financial Resource Strain: Medium Risk (1/12/2024)    Overall Financial Resource Strain (CARDIA)     Difficulty of Paying Living Expenses: Somewhat hard   Food Insecurity: Food Insecurity Present (1/12/2024)    Hunger Vital Sign     Worried About Running Out of Food in the Last Year: Sometimes true     Ran Out of Food in the Last Year: Never true   Transportation Needs: No Transportation Needs (1/12/2024)    PRAPARE - Transportation     Lack of Transportation (Medical): No     Lack of Transportation (Non-Medical): No   Physical Activity: Inactive (1/12/2024)    Exercise Vital Sign     Days of Exercise per Week: 0 days     Minutes of Exercise per Session: 0 min   Stress: Stress Concern Present (1/12/2024)    [place] [employer] - Occupational Stress Questionnaire     Feeling of Stress : Very much   Social Connections: Moderately Isolated (1/12/2024)    Social Connection and Isolation Panel [NHANES]     Frequency of Communication with Friends and Family: More than three times a week     Attends Yazdanism Services: Never     Active Member of Clubs or Organizations: No     Attends Club or Organization Meetings: Never     Marital Status:    Housing Stability: Low Risk  (1/12/2024)    Housing Stability Vital Sign     Unable to Pay for Housing in the Last Year: No     Number of Places Lived in the Last Year: 1  "    Unstable Housing in the Last Year: No       Review of Systems:  Constitutional:  Negative for activity change, appetite change, chills, diaphoresis, fatigue, fever and unexpected weight change.   HENT:  Negative for nasal congestion, sinus pressure/congestion and sneezing.    Eyes:  Negative for discharge and itching.   Respiratory:  Negative for cough, choking, chest tightness, shortness of breath, wheezing and stridor.    Cardiovascular:  Negative for palpitations, leg swelling and claudication.   Gastrointestinal:  Negative for abdominal distention, nausea, vomiting and reflux.   Allergic/Immunologic: Negative for environmental allergies, food allergies and immunocompromised state.   Neurological:  Negative for dizziness, light-headedness and headaches.       OBJECTIVE:     Vital Signs  Vitals:    01/31/24 1552   Pulse: 80   SpO2: (!) 94%   Weight: 131.2 kg (289 lb 3.9 oz)   Height: 5' 7" (1.702 m)     Body mass index is 45.3 kg/m².    Physical Exam:  Constitutional:       Normal appearance, appears in no acute distress.Mental status alert and oriented.  HENT:      Head: Normocephalic and atraumatic.      Eyelid: No eyelid discharge. Pupils PERRLA.     Nose: No congestion or rhinorrhea.   Cardiovascular:      Rate and Rhythm: Normal rate and regular rhythm.      Pulses: Normal pulses.      Heart sounds: Normal heart sounds. No murmur heard.  Pulmonary:      Effort: Pulmonary effort is normal. No respiratory distress.      Breath sounds: Diminished breath sounds      Chest wall: No tenderness.   Abdominal:      General: Abdomen is flat.      Palpations: Abdomen is soft.     Laboratory:  Lab Results   Component Value Date    WBC 7.23 12/31/2023    HGB 11.6 (L) 12/31/2023    HCT 37.5 12/31/2023    MCV 86 12/31/2023     12/31/2023       Chest Imaging, My Impression:   CT Chest 12/2023: mild centrilobular emphysema, ground glass opacities not seen on CT chest 08/2023 1/31/2024     3:52 PM 1/12/2024 " "    1:32 PM 12/31/2023    12:29 PM 12/31/2023    11:42 AM 12/31/2023     9:00 AM 12/31/2023     7:32 AM 12/31/2023     6:41 AM   Pulmonary Studies Review   SpO2 94 % 92 % 93 % 94 % 93 % 93 % 95 %   Height 5' 7" (1.702 m) 5' 7" (1.702 m)        Weight 131.2 kg (289 lb 3.9 oz) 130.6 kg (287 lb 14.7 oz)        BMI (Calculated) 45.3 45.1        Predicted Distance 233.87 235.12        Predicted Distance Meters (Calculated) 367.31 meters 368.69 meters          PFT 01/03/2024: FEV1/FVC 91%, no obstruction, TLC 82% borderline restriction, DLCO 59.5%      ASSESSMENT/PLAN:     1. Centrilobular emphysema  Overview:  Patient is a 63 year-old female here as a hospital follow up. PFT 01/03/2024 with no obstruction, positive for mild restriction and decrease diffusing capacity. CT Chest 12/2023 with centrilobular emphysema .  -Continue trelegy inhaler for maintenance therapy  -Continue albuterol inhaler for rescue therapy.  -Refills for duonebs sent to local pharmacy.  -Given acute appearance of patchy GG on CT 12/2023, I suspect acute viral infection as a cause of the pt's respiratory failure. Additionally, she reports that her  was sick with a respiratory infection at the same time as her.    2. Cigarette nicotine dependence without complication  Overview:  -Counseled patient on smoking cessation.  -Continue journey to quitting cigarettes. Don't give up!   -Patient is enrolled in the smoking cessation program.   -Continue annual LDCT. Next one is due 12/2024.    3. SHERMAN (dyspnea on exertion)    4. Class 3 obesity with alveolar hypoventilation, serious comorbidity, and body mass index (BMI) of 45.0 to 49.9 in adult  -Counseled patient on weight loss & lung function.     RETURN TO CLINIC IN 1 YEAR    Total professional time spent for the encounter: 45 minutes  Time was spent preparing to see the patient, reviewing results of prior testing, obtaining and/or reviewing separately obtained history, performing a medically " appropriate examination and interview, counseling and educating the patient/family, ordering medications/tests/procedures, referring and communicating with other health care professionals, documenting clinical information in the electronic health record, and independently interpreting results.    Ankur Ford MD

## 2024-02-01 ENCOUNTER — EXTERNAL HOME HEALTH (OUTPATIENT)
Dept: HOME HEALTH SERVICES | Facility: HOSPITAL | Age: 64
End: 2024-02-01
Payer: COMMERCIAL

## 2024-02-28 ENCOUNTER — PATIENT MESSAGE (OUTPATIENT)
Dept: INTERNAL MEDICINE | Facility: CLINIC | Age: 64
End: 2024-02-28
Payer: COMMERCIAL

## 2024-02-29 ENCOUNTER — DOCUMENT SCAN (OUTPATIENT)
Dept: HOME HEALTH SERVICES | Facility: HOSPITAL | Age: 64
End: 2024-02-29
Payer: COMMERCIAL

## 2024-02-29 ENCOUNTER — DOCUMENT SCAN (OUTPATIENT)
Dept: HOME HEALTH SERVICES | Facility: HOSPITAL | Age: 64
End: 2024-02-29

## 2024-04-01 PROBLEM — J96.21 ACUTE ON CHRONIC RESPIRATORY FAILURE WITH HYPOXIA: Status: RESOLVED | Noted: 2021-07-02 | Resolved: 2024-04-01

## 2024-05-24 ENCOUNTER — PATIENT MESSAGE (OUTPATIENT)
Dept: FAMILY MEDICINE | Facility: CLINIC | Age: 64
End: 2024-05-24
Payer: COMMERCIAL

## 2024-07-31 ENCOUNTER — HOSPITAL ENCOUNTER (EMERGENCY)
Facility: HOSPITAL | Age: 64
Discharge: HOME | End: 2024-07-31
Attending: EMERGENCY MEDICINE
Payer: COMMERCIAL

## 2024-07-31 VITALS
RESPIRATION RATE: 16 BRPM | TEMPERATURE: 98 F | DIASTOLIC BLOOD PRESSURE: 79 MMHG | BODY MASS INDEX: 42.38 KG/M2 | HEART RATE: 76 BPM | HEIGHT: 67 IN | SYSTOLIC BLOOD PRESSURE: 114 MMHG | WEIGHT: 270 LBS | OXYGEN SATURATION: 97 %

## 2024-07-31 DIAGNOSIS — M70.72 BURSITIS OF LEFT HIP, UNSPECIFIED BURSA: Primary | ICD-10-CM

## 2024-07-31 LAB
BILIRUB UR QL STRIP: ABNORMAL
CLARITY UR REFRACT.AUTO: ABNORMAL
COLOR UR AUTO: YELLOW
GLUCOSE UR QL STRIP: NEGATIVE
HGB UR QL STRIP: NEGATIVE
KETONES UR QL STRIP: NEGATIVE
LEUKOCYTE ESTERASE UR QL STRIP: NEGATIVE
NITRITE UR QL STRIP: NEGATIVE
PH UR STRIP: 6 [PH] (ref 5–8)
PROT UR QL STRIP: NEGATIVE
SP GR UR STRIP: >=1.03 (ref 1–1.03)
URN SPEC COLLECT METH UR: ABNORMAL
UROBILINOGEN UR STRIP-ACNC: 1 EU/DL

## 2024-07-31 PROCEDURE — 81003 URINALYSIS AUTO W/O SCOPE: CPT | Mod: ER | Performed by: EMERGENCY MEDICINE

## 2024-07-31 PROCEDURE — 99285 EMERGENCY DEPT VISIT HI MDM: CPT | Mod: 25,ER

## 2024-07-31 PROCEDURE — 63600175 PHARM REV CODE 636 W HCPCS: Mod: ER | Performed by: EMERGENCY MEDICINE

## 2024-07-31 PROCEDURE — 96372 THER/PROPH/DIAG INJ SC/IM: CPT | Performed by: EMERGENCY MEDICINE

## 2024-07-31 RX ORDER — KETOROLAC TROMETHAMINE 30 MG/ML
15 INJECTION, SOLUTION INTRAMUSCULAR; INTRAVENOUS
Status: COMPLETED | OUTPATIENT
Start: 2024-07-31 | End: 2024-07-31

## 2024-07-31 RX ORDER — DEXAMETHASONE SODIUM PHOSPHATE 4 MG/ML
8 INJECTION, SOLUTION INTRA-ARTICULAR; INTRALESIONAL; INTRAMUSCULAR; INTRAVENOUS; SOFT TISSUE
Status: COMPLETED | OUTPATIENT
Start: 2024-07-31 | End: 2024-07-31

## 2024-07-31 RX ORDER — KETOROLAC TROMETHAMINE 10 MG/1
10 TABLET, FILM COATED ORAL EVERY 6 HOURS
Qty: 20 TABLET | Refills: 0 | Status: SHIPPED | OUTPATIENT
Start: 2024-07-31 | End: 2024-08-05

## 2024-07-31 RX ADMIN — DEXAMETHASONE SODIUM PHOSPHATE 8 MG: 4 INJECTION, SOLUTION INTRA-ARTICULAR; INTRALESIONAL; INTRAMUSCULAR; INTRAVENOUS; SOFT TISSUE at 10:07

## 2024-07-31 RX ADMIN — KETOROLAC TROMETHAMINE 15 MG: 30 INJECTION, SOLUTION INTRAMUSCULAR at 10:07

## 2024-08-12 ENCOUNTER — HOSPITAL ENCOUNTER (OUTPATIENT)
Dept: RADIOLOGY | Facility: HOSPITAL | Age: 64
Discharge: HOME | End: 2024-08-12
Attending: FAMILY MEDICINE
Payer: COMMERCIAL

## 2024-08-12 ENCOUNTER — OFFICE VISIT (OUTPATIENT)
Dept: SPORTS MEDICINE | Facility: CLINIC | Age: 64
End: 2024-08-12
Payer: COMMERCIAL

## 2024-08-12 DIAGNOSIS — M16.12 PRIMARY OSTEOARTHRITIS OF LEFT HIP: ICD-10-CM

## 2024-08-12 DIAGNOSIS — M70.72 BURSITIS OF LEFT HIP, UNSPECIFIED BURSA: ICD-10-CM

## 2024-08-12 DIAGNOSIS — M16.12 LOCALIZED OSTEOARTHROSIS OF LEFT HIP: Primary | ICD-10-CM

## 2024-08-12 DIAGNOSIS — Z12.31 BREAST CANCER SCREENING BY MAMMOGRAM: ICD-10-CM

## 2024-08-12 DIAGNOSIS — M25.552 ACUTE HIP PAIN, LEFT: Primary | ICD-10-CM

## 2024-08-12 PROCEDURE — 77067 SCR MAMMO BI INCL CAD: CPT | Mod: TC,PN

## 2024-08-12 PROCEDURE — 99204 OFFICE O/P NEW MOD 45 MIN: CPT | Mod: S$GLB,,, | Performed by: STUDENT IN AN ORGANIZED HEALTH CARE EDUCATION/TRAINING PROGRAM

## 2024-08-12 PROCEDURE — 1159F MED LIST DOCD IN RCRD: CPT | Mod: CPTII,S$GLB,, | Performed by: STUDENT IN AN ORGANIZED HEALTH CARE EDUCATION/TRAINING PROGRAM

## 2024-08-12 PROCEDURE — 77067 SCR MAMMO BI INCL CAD: CPT | Mod: 26,,, | Performed by: RADIOLOGY

## 2024-08-12 PROCEDURE — 4010F ACE/ARB THERAPY RXD/TAKEN: CPT | Mod: CPTII,S$GLB,, | Performed by: STUDENT IN AN ORGANIZED HEALTH CARE EDUCATION/TRAINING PROGRAM

## 2024-08-12 PROCEDURE — 1160F RVW MEDS BY RX/DR IN RCRD: CPT | Mod: CPTII,S$GLB,, | Performed by: STUDENT IN AN ORGANIZED HEALTH CARE EDUCATION/TRAINING PROGRAM

## 2024-08-12 PROCEDURE — 99999 PR PBB SHADOW E&M-EST. PATIENT-LVL IV: CPT | Mod: PBBFAC,,, | Performed by: STUDENT IN AN ORGANIZED HEALTH CARE EDUCATION/TRAINING PROGRAM

## 2024-08-12 PROCEDURE — 77063 BREAST TOMOSYNTHESIS BI: CPT | Mod: 26,,, | Performed by: RADIOLOGY

## 2024-08-12 RX ORDER — IBUPROFEN 800 MG/1
800 TABLET ORAL EVERY 8 HOURS PRN
Qty: 30 TABLET | Refills: 1 | Status: SHIPPED | OUTPATIENT
Start: 2024-08-12

## 2024-08-12 RX ORDER — TRAMADOL HYDROCHLORIDE 50 MG/1
TABLET ORAL
Qty: 21 TABLET | Refills: 0 | Status: SHIPPED | OUTPATIENT
Start: 2024-08-12

## 2024-08-12 NOTE — PATIENT INSTRUCTIONS
Assessment:  Umu Clayton is a 64 y.o. female with a chief complaint of Pain of the Left Hip    Encounter Diagnoses   Name Primary?    Acute hip pain, left Yes    Primary osteoarthritis of left hip     Bursitis of left hip, unspecified bursa       Plan:  Imaging reviewed - x-ray and CT with mild degenerative changes about the left hip joint, no acute abnormalities or fractures, no evidence of AVN, or osseous lesion.  Labs reviewed - Cr 0.8, GFR > 60  History and clinical exam is concerning for acute left hip pain due to underlying degenerative osteoarthritis.  No mechanism is injury to suggest acute injury or trauma, no concerning findings on CT.    Patient has not improved with IM or PO NSAIDs.  Would recommend diagnostic intra-articular in.  We will refer to our pain management department for this to be completed under fluoroscopic guidance given patient's habitus.  Prescription sent for ibuprofen 800 mg, which can be taken every 8 hours as needed for pain and discomfort.  Short course of tramadol 50 mg prescribed today, which can be taken 3 times daily as needed for severe, breakthrough pain.  If not improving with the injection, may warrant MRI for further evaluation.    Follow-up:  After injection, as needed or sooner if there are any problems between now and then.    Thank you for choosing [facility] and Dr. Hola Duke for your orthopedic & sports medicine care. It is our goal to provide you with exceptional care that will help keep you healthy, active, and get you back in the game.    Please do not hesitate to reach out to us via email, phone, or MyChart with any questions, concerns, or feedback.    If you are experiencing pain/discomfort ,or have questions after 5pm and would like to be connected to the [facility] on-call team, please call this number and specify which Sports Medicine provider is treating you: [phone]

## 2024-08-12 NOTE — PROGRESS NOTES
Patient ID: Umu Clayton  YOB: 1960  MRN: [id number]    Chief Complaint: Pain of the Left Hip    Referred By: Dr Anahi Villegas MD    Occupation: homemaker      History of Present Illness: Umu Clayton is a 64 y.o. female who presents today with Pain of the Left Hip    Patient reports acute onset left-sided hip pelvic pain.  Pain started approx.  2 weeks ago.  Denies any inciting injury or trauma.  Pain is localized to the anterior and lateral aspect of her upper thigh and groin.  Describes the pain as at different times aching, throbbing, sharp, burning, shooting, and stinging.  She has noticed significant weakness, feelings of numbness, and feeling unstable underlying.  Pain is worse with walking, any kind of weight-bearing, needed time she is trying to turn over, go from a sitting to a standing position.  Pain also occurs at night when she is trying to sleep.  She has tried Tylenol, naproxen, gabapentin.  She has also tried.    She went to the emergency room on , x-rays and CT negative at that time.  She was given IM Toradol and dexamethasone injections, which helped little bit.  She was then discharged with oral Toradol prescription, which did not help at all.  Denies any prior incidents like this.  Has had low back pain in the past, but no sciatica or radiculopathy.      Past Medical History:   Past Medical History:   Diagnosis Date    Anxiety     Emphysema lung     GERD (gastroesophageal reflux disease)     History of stomach ulcers     Hypertension     Restless leg syndrome     Ulcerative colitis      Past Surgical History:   Procedure Laterality Date    gastric by pass      HERNIA REPAIR      HYSTERECTOMY      OOPHORECTOMY Right      Family History   Problem Relation Name Age of Onset    COPD Father Ced Ely     Lung cancer Father Ced Ely     Colon cancer Maternal Grandmother Sara vizcarra         Colon cancer    COPD Mother Ethel Ely             Multiple  sclerosis Son      Heart disease Neg Hx      Stroke Neg Hx       Social History     Socioeconomic History    Marital status:    Tobacco Use    Smoking status: Every Day     Current packs/day: 2.50     Average packs/day: 2.5 packs/day for 49.6 years (124.0 ttl pk-yrs)     Types: Cigarettes     Start date: 1975    Smokeless tobacco: Never    Tobacco comments:     Enrolled in the [employer] on 7/1/21 ([facility] Member ID # [id number]). Ambulatory referral to Smoking Cessation program.    Substance and Sexual Activity    Alcohol use: Never    Drug use: No    Sexual activity: Yes     Partners: Male     Birth control/protection: None     Social Determinants of Health     Financial Resource Strain: Low Risk  (8/6/2024)    Overall Financial Resource Strain (CARDIA)     Difficulty of Paying Living Expenses: Not very hard   Food Insecurity: No Food Insecurity (8/6/2024)    Hunger Vital Sign     Worried About Running Out of Food in the Last Year: Never true     Ran Out of Food in the Last Year: Never true   Transportation Needs: No Transportation Needs (1/12/2024)    PRAPARE - Transportation     Lack of Transportation (Medical): No     Lack of Transportation (Non-Medical): No   Physical Activity: Inactive (8/6/2024)    Exercise Vital Sign     Days of Exercise per Week: 1 day     Minutes of Exercise per Session: 0 min   Stress: Stress Concern Present (8/6/2024)    [employer] [place] [employer] - Occupational Stress Questionnaire     Feeling of Stress : To some extent   Housing Stability: Low Risk  (1/12/2024)    Housing Stability Vital Sign     Unable to Pay for Housing in the Last Year: No     Number of Places Lived in the Last Year: 1     Unstable Housing in the Last Year: No     Medication List with Changes/Refills   New Medications    IBUPROFEN (ADVIL,MOTRIN) 800 MG TABLET    Take 1 tablet (800 mg total) by mouth every 8 (eight) hours as needed for Pain.    TRAMADOL (ULTRAM) 50 MG TABLET    Take 1 tablet (50  mg), by mouth, every 8 hours, as needed for pain   Current Medications    ALBUTEROL-IPRATROPIUM (DUO-NEB) 2.5 MG-0.5 MG/3 ML NEBULIZER SOLUTION    Take 3 mLs by nebulization every 6 (six) hours as needed for Wheezing or Shortness of Breath. Rescue    BACLOFEN (LIORESAL) 10 MG TABLET    Take 1 tablet (10 mg total) by mouth nightly as needed (msucle pain).    BENZONATATE (TESSALON) 100 MG CAPSULE    Take 100 mg by mouth 3 (three) times daily.    CETIRIZINE (ZYRTEC) 10 MG TABLET    Take 10 mg by mouth once daily.    CYANOCOBALAMIN, VITAMIN B-12, 1,000 MCG SUBL    Place 1,000 mcg under the tongue once daily.    ERGOCALCIFEROL (ERGOCALCIFEROL) 50,000 UNIT CAP    Vitamin D2 1,250 mcg (50,000 unit) capsule   Take 1 capsule every week by oral route.    FERROUS SULFATE (IRON) 325 MG (65 MG IRON) TAB TABLET    Take 1 tablet (325 mg total) by mouth daily with breakfast.    FLUTICASONE PROPIONATE (FLONASE) 50 MCG/ACTUATION NASAL SPRAY    1 spray by Each Nostril route 2 (two) times daily.    FLUTICASONE-UMECLIDIN-VILANTER (TRELEGY ELLIPTA) 200-62.5-25 MCG INHALER    Inhale 1 puff into the lungs once daily.    FOLIC ACID (FOLVITE) 1 MG TABLET    Take 1 tablet (1 mg total) by mouth once daily.    FUROSEMIDE (LASIX) 40 MG TABLET    Take 1 tablet (40 mg total) by mouth once daily.    GABAPENTIN (NEURONTIN) 100 MG CAPSULE    Take 1 capsule (100 mg total) by mouth 3 (three) times daily.    LINZESS 145 MCG CAP CAPSULE    Take 145 mcg by mouth once daily.    LISINOPRIL-HYDROCHLOROTHIAZIDE (PRINZIDE,ZESTORETIC) 20-12.5 MG PER TABLET    Take 2 tablets by mouth once daily.    LORATADINE (CLARITIN) 10 MG TABLET    Take 1 tablet (10 mg total) by mouth once daily.    MELATONIN (MELATIN) 3 MG TABLET    Take 2 tablets (6 mg total) by mouth nightly.    MESALAMINE (LIALDA) 1.2 GRAM TBEC    Take 2.4 g by mouth.    NICOTINE (NICODERM CQ) 21 MG/24 HR    Place 2 patches onto the skin once daily.    RABEPRAZOLE (ACIPHEX) 20 MG TABLET    Take 20 mg by  mouth once daily.    ROPINIROLE (REQUIP) 0.5 MG TABLET    Take 1 tablet (0.5 mg total) by mouth every evening.    SEMAGLUTIDE, WEIGHT LOSS, (WEGOVY) 0.25 MG/0.5 ML PNIJ    Inject 0.25 mg into the skin every 7 days.    SERTRALINE (ZOLOFT) 100 MG TABLET    Take 2 tablets (200 mg total) by mouth once daily.    SUCRALFATE (CARAFATE) 1 GRAM TABLET    sucralfate 1 gram tablet     Review of patient's allergies indicates:   Allergen Reactions    Penicillins Hives       Physical Exam:   There is no height or weight on file to calculate BMI.    Physical Exam  Detailed MSK exam:     Left Hip:    Inspection: normal    Palpation tenderness: Anterior    Range of motion: 80 deg Flexion       20 deg IR       40 deg ER    Strength:  5-/5 Flexion    5-/5 Abduction    4/5 Adduction    Impingement Tests: Positive SHERRILL     Positive FADIR    Other Tests:  Positive Log Roll  Positive Circumduction  Negative Piriformis     Negative Jody's     Equivocal SLR     Equivocal Slump test    N/V Exam:  Tibial:    Normal sensory (plantar foot)  Normal motor (FHL)    Sup Peroneal:  Normal sensory (dorsal foot)  Normal motor (Peroneals)            Deep Peroneal:  Normal sensory (1st web space) Normal motor (EHL)    Sural:   Normal sensory (lateral foot)   Saphenous:   Normal sensory (medial lower leg)   Normal pedal pulses, warm and well perfused with capillary refill < 2 sec         Imaging:  CT Hip Without Contrast Left  Narrative: EXAMINATION:  CT HIP WITHOUT CONTRAST LEFT    CLINICAL HISTORY:  Hip pain, stress fracture suspected, neg xray;    TECHNIQUE:  CT of the left hip with sagittal and coronal reformations without contrast.    COMPARISON:  Prior radiograph    FINDINGS:  No acute fracture or dislocation.  Minimal mild degenerative joint disease.  Fluid adjacent to the anterior musculature of the thigh may relate to bursal fluid or other.  Recommend clinical correlation and follow-up.  Consider follow-up MRI for further  evaluation.  Impression: As above    All CT scans   are performed using dose optimization techniques including the following: automated exposure control; adjustment of the mA and/or kV; use of iterative reconstruction technique.  Dose modulation was employed for ALARA by means of: Automated exposure control; adjustment of the mA and/or kV according to patient size (this includes techniques or standardized protocols for targeted exams where dose is matched to indication/reason for exam; i.e. extremities or head); and/or use of iterative reconstructive technique.    Electronically signed by: Gus Maddox  Date:    07/31/2024  Time:    21:25  X-Ray Hip 2 or 3 views Left with Pelvis when performed  Narrative: EXAMINATION:  XR HIP WITH PELVIS WHEN PERFORMED 2 OR 3 VIEWS LEFT    CLINICAL HISTORY:  severe pain, cannot bear weight;    TECHNIQUE:  AP view of the pelvis and frog leg lateral view of the left hip were performed.    COMPARISON:  None    FINDINGS:  No fracture or dislocation.  Mild degenerative joint disease  Impression: As above    Electronically signed by: Gus Maddox  Date:    07/31/2024  Time:    20:03      Relevant imaging results were reviewed and interpreted by me and per my read:  Mild degenerative changes about the left hip.  Normal alignment.  Evidence of AVN.  No fractures or other acute abnormalities.    This was discussed with the patient and / or family today.     Patient Instructions   Assessment:  Umu Clayton is a 64 y.o. female with a chief complaint of Pain of the Left Hip    Encounter Diagnoses   Name Primary?    Acute hip pain, left Yes    Primary osteoarthritis of left hip     Bursitis of left hip, unspecified bursa       Plan:  Imaging reviewed - x-ray and CT with mild degenerative changes about the left hip joint, no acute abnormalities or fractures, no evidence of AVN, or osseous lesion.  Labs reviewed - Cr 0.8, GFR > 60  History and clinical exam is concerning for acute left hip pain  due to underlying degenerative osteoarthritis.  No mechanism is injury to suggest acute injury or trauma, no concerning findings on CT.    Patient has not improved with IM or PO NSAIDs.  Would recommend diagnostic intra-articular in.  We will refer to our pain management department for this to be completed under fluoroscopic guidance given patient's habitus.  Prescription sent for ibuprofen 800 mg, which can be taken every 8 hours as needed for pain and discomfort.  Short course of tramadol 50 mg prescribed today, which can be taken 3 times daily as needed for severe, breakthrough pain.  If not improving with the injection, may warrant MRI for further evaluation.    Follow-up:  After injection, as needed or sooner if there are any problems between now and then.    Thank you for choosing [email or web address] and Dr. Hola Duke for your orthopedic & sports medicine care. It is our goal to provide you with exceptional care that will help keep you healthy, active, and get you back in the game.    Please do not hesitate to reach out to us via email, phone, or MyChart with any questions, concerns, or feedback.    If you are experiencing pain/discomfort ,or have questions after 5pm and would like to be connected to the [email or web address] on-call team, please call this number and specify which Sports Medicine provider is treating you: [phone]       A copy of today's visit note has been sent to the referring provider.           Hola Duke MD  Primary Care Sports Medicine    Disclaimer: This note was prepared using a voice recognition system and is likely to have sound alike errors within the text.

## 2024-08-13 ENCOUNTER — TELEPHONE (OUTPATIENT)
Dept: PAIN MEDICINE | Facility: CLINIC | Age: 64
End: 2024-08-13
Payer: COMMERCIAL

## 2024-08-13 ENCOUNTER — PATIENT MESSAGE (OUTPATIENT)
Dept: PAIN MEDICINE | Facility: CLINIC | Age: 64
End: 2024-08-13
Payer: COMMERCIAL

## 2024-08-13 NOTE — TELEPHONE ENCOUNTER
----- Message from Mario Alberto Eagle MD sent at 8/12/2024 11:23 AM CDT -----  Pain Provider: Sh[facility]a  Patient Name: Umu Clayton  MRN: [id number]  Case: HIP JOINT INJECTION  Laterality: left      Patient can follow up 2 weeks after procedure with orthopedics

## 2024-08-14 DIAGNOSIS — R73.03 PREDIABETES: ICD-10-CM

## 2024-08-14 NOTE — PRE-PROCEDURE INSTRUCTIONS
Spoke with patient regarding procedure scheduled on 8.28    Arrival time 0600     Has patient been sick with fever or on antibiotics within the last 7 days? No     Does the patient have any open wounds, sores or rashes? No     Does the patient have any recent fractures? no     Has patient received a vaccination within the last 7 days? No     Received the COVID vaccination? yes     Has the patient stopped all medications as directed? na     Does patient have a pacemaker, defibrillator, or implantable stimulator? No     Does the patient have a ride to and from procedure and someone reliable to remain with patient?  DAUGHTER     Is the patient diabetic? no     Does the patient have sleep apnea? Or use O2 at home? no     Is the patient receiving sedation? Yes      Is the patient instructed to remain NPO beginning at midnight the night before their procedure? yes     Procedure location confirmed with patient? Yes     Covid- Denies signs/symptoms. Instructed to notify PAT/MD if any changes.

## 2024-08-28 ENCOUNTER — HOSPITAL ENCOUNTER (OUTPATIENT)
Facility: HOSPITAL | Age: 64
Discharge: HOME | End: 2024-08-28
Attending: ANESTHESIOLOGY | Admitting: ANESTHESIOLOGY
Payer: COMMERCIAL

## 2024-08-28 VITALS
HEIGHT: 67 IN | TEMPERATURE: 99 F | HEART RATE: 79 BPM | DIASTOLIC BLOOD PRESSURE: 56 MMHG | RESPIRATION RATE: 17 BRPM | OXYGEN SATURATION: 95 % | SYSTOLIC BLOOD PRESSURE: 90 MMHG | WEIGHT: 266.75 LBS | BODY MASS INDEX: 41.87 KG/M2

## 2024-08-28 DIAGNOSIS — M16.12 LOCALIZED OSTEOARTHROSIS OF LEFT HIP: ICD-10-CM

## 2024-08-28 PROCEDURE — 77002 NEEDLE LOCALIZATION BY XRAY: CPT | Mod: 26,,, | Performed by: ANESTHESIOLOGY

## 2024-08-28 PROCEDURE — 20610 DRAIN/INJ JOINT/BURSA W/O US: CPT | Mod: LT | Performed by: ANESTHESIOLOGY

## 2024-08-28 PROCEDURE — 20610 DRAIN/INJ JOINT/BURSA W/O US: CPT | Mod: LT,,, | Performed by: ANESTHESIOLOGY

## 2024-08-28 PROCEDURE — 25000003 PHARM REV CODE 250: Performed by: ANESTHESIOLOGY

## 2024-08-28 PROCEDURE — 63600175 PHARM REV CODE 636 W HCPCS: Performed by: ANESTHESIOLOGY

## 2024-08-28 PROCEDURE — 25500020 PHARM REV CODE 255: Performed by: ANESTHESIOLOGY

## 2024-08-28 RX ORDER — MIDAZOLAM HYDROCHLORIDE 1 MG/ML
INJECTION, SOLUTION INTRAMUSCULAR; INTRAVENOUS
Status: DISCONTINUED | OUTPATIENT
Start: 2024-08-28 | End: 2024-08-28 | Stop reason: HOSPADM

## 2024-08-28 RX ORDER — FENTANYL CITRATE 50 UG/ML
INJECTION, SOLUTION INTRAMUSCULAR; INTRAVENOUS
Status: DISCONTINUED | OUTPATIENT
Start: 2024-08-28 | End: 2024-08-28 | Stop reason: HOSPADM

## 2024-08-28 RX ORDER — LIDOCAINE HYDROCHLORIDE 10 MG/ML
INJECTION, SOLUTION EPIDURAL; INFILTRATION; INTRACAUDAL; PERINEURAL
Status: DISCONTINUED | OUTPATIENT
Start: 2024-08-28 | End: 2024-08-28 | Stop reason: HOSPADM

## 2024-08-28 RX ORDER — METHYLPREDNISOLONE ACETATE 40 MG/ML
INJECTION, SUSPENSION INTRA-ARTICULAR; INTRALESIONAL; INTRAMUSCULAR; SOFT TISSUE
Status: DISCONTINUED | OUTPATIENT
Start: 2024-08-28 | End: 2024-08-28 | Stop reason: HOSPADM

## 2024-08-28 RX ORDER — ONDANSETRON HYDROCHLORIDE 2 MG/ML
4 INJECTION, SOLUTION INTRAVENOUS ONCE AS NEEDED
Status: DISCONTINUED | OUTPATIENT
Start: 2024-08-28 | End: 2024-08-28 | Stop reason: HOSPADM

## 2024-08-28 NOTE — H&P
HPI  Patient presenting for Procedure(s) (LRB):  Left IA Hip Joint injection (Left)     Patient on Anti-coagulation No    No health changes since previous encounter    Past Medical History:   Diagnosis Date    Anxiety     Emphysema lung     GERD (gastroesophageal reflux disease)     History of stomach ulcers     Hypertension     Restless leg syndrome     Ulcerative colitis      Past Surgical History:   Procedure Laterality Date    gastric by pass      HERNIA REPAIR  1998    HYSTERECTOMY      OOPHORECTOMY Right      Review of patient's allergies indicates:   Allergen Reactions    Penicillins Hives        No current facility-administered medications on file prior to encounter.     Current Outpatient Medications on File Prior to Encounter   Medication Sig Dispense Refill    baclofen (LIORESAL) 10 MG tablet Take 1 tablet (10 mg total) by mouth nightly as needed (msucle pain). 90 tablet 5    cetirizine (ZYRTEC) 10 MG tablet Take 10 mg by mouth once daily.      fluticasone-umeclidin-vilanter (TRELEGY ELLIPTA) 200-62.5-25 mcg inhaler Inhale 1 puff into the lungs once daily. 60 each 11    folic acid (FOLVITE) 1 MG tablet Take 1 tablet (1 mg total) by mouth once daily. 90 tablet 3    furosemide (LASIX) 40 MG tablet Take 1 tablet (40 mg total) by mouth once daily. 90 tablet 3    gabapentin (NEURONTIN) 100 MG capsule Take 1 capsule (100 mg total) by mouth 3 (three) times daily. 270 capsule 11    LINZESS 145 mcg Cap capsule Take 145 mcg by mouth once daily.      lisinopriL-hydrochlorothiazide (PRINZIDE,ZESTORETIC) 20-12.5 mg per tablet Take 2 tablets by mouth once daily. 180 tablet 11    mesalamine (LIALDA) 1.2 gram TbEC Take 2.4 g by mouth.      RABEprazole (ACIPHEX) 20 mg tablet Take 20 mg by mouth once daily.      rOPINIRole (REQUIP) 0.5 MG tablet Take 1 tablet (0.5 mg total) by mouth every evening. 90 tablet 3    sertraline (ZOLOFT) 100 MG tablet Take 2 tablets (200 mg total) by mouth once daily. 180 tablet 3    sucralfate  "(CARAFATE) 1 gram tablet sucralfate 1 gram tablet      albuterol-ipratropium (DUO-NEB) 2.5 mg-0.5 mg/3 mL nebulizer solution Take 3 mLs by nebulization every 6 (six) hours as needed for Wheezing or Shortness of Breath. Rescue 180 mL 2    cyanocobalamin, vitamin B-12, 1,000 mcg Subl Place 1,000 mcg under the tongue once daily. 90 tablet 3    ergocalciferol (ERGOCALCIFEROL) 50,000 unit Cap Vitamin D2 1,250 mcg (50,000 unit) capsule   Take 1 capsule every week by oral route.      fluticasone propionate (FLONASE) 50 mcg/actuation nasal spray 1 spray by Each Nostril route 2 (two) times daily.      ibuprofen (ADVIL,MOTRIN) 800 MG tablet Take 1 tablet (800 mg total) by mouth every 8 (eight) hours as needed for Pain. 30 tablet 1    nicotine (NICODERM CQ) 21 mg/24 hr Place 2 patches onto the skin once daily. (Patient not taking: Reported on 1/2/2024) 60 patch 1    [DISCONTINUED] benzonatate (TESSALON) 100 MG capsule Take 100 mg by mouth 3 (three) times daily.      [DISCONTINUED] ferrous sulfate (IRON) 325 mg (65 mg iron) Tab tablet Take 1 tablet (325 mg total) by mouth daily with breakfast. 90 tablet 3    [DISCONTINUED] loratadine (CLARITIN) 10 mg tablet Take 1 tablet (10 mg total) by mouth once daily. 60 tablet 0    [DISCONTINUED] melatonin (MELATIN) 3 mg tablet Take 2 tablets (6 mg total) by mouth nightly. (Patient not taking: Reported on 1/12/2024) 30 tablet 11    [DISCONTINUED] traMADoL (ULTRAM) 50 mg tablet Take 1 tablet (50 mg), by mouth, every 8 hours, as needed for pain 21 tablet 0        PMHx, PSHx, Allergies, Medications reviewed in epic    ROS negative except pain complaints in HPI    OBJECTIVE:    /76 (BP Location: Right arm, Patient Position: Sitting)   Pulse 83   Temp 97 °F (36.1 °C)   Resp 18   Ht 5' 7" (1.702 m)   Wt 121 kg (266 lb 12.1 oz)   SpO2 95%   Breastfeeding No   BMI 41.78 kg/m²     PHYSICAL EXAMINATION:    GENERAL: Well appearing, in no acute distress, alert and oriented x3.  PSYCH:  " Mood and affect appropriate.  SKIN: Skin color, texture, turgor normal, no rashes or lesions which will impact the procedure.  CV: RRR with palpation of the radial artery.  PULM: No evidence of respiratory difficulty, symmetric chest rise. Clear to auscultation.  NEURO: Cranial nerves grossly intact.    Plan:    Proceed with procedure as planned Procedure(s) (LRB):  Left IA Hip Joint injection (Left)    Mario Alberto Eagle MD  08/28/2024

## 2024-08-28 NOTE — DISCHARGE INSTRUCTIONS

## 2024-08-28 NOTE — DISCHARGE SUMMARY
Discharge Note  Short Stay      SUMMARY     Admit Date: 8/28/2024    Attending Physician: Mario Alberto Eagle MD        Discharge Physician: Mario Alberto Eagle MD        Discharge Date: 8/28/2024 7:30 AM    Procedure(s) (LRB):  Left IA Hip Joint injection (Left)    Final Diagnosis: Localized osteoarthrosis of left hip [M16.12]    Disposition: Home or self care    Patient Instructions:   Current Discharge Medication List        CONTINUE these medications which have NOT CHANGED    Details   baclofen (LIORESAL) 10 MG tablet Take 1 tablet (10 mg total) by mouth nightly as needed (msucle pain).  Qty: 90 tablet, Refills: 5      cetirizine (ZYRTEC) 10 MG tablet Take 10 mg by mouth once daily.      fluticasone-umeclidin-vilanter (TRELEGY ELLIPTA) 200-62.5-25 mcg inhaler Inhale 1 puff into the lungs once daily.  Qty: 60 each, Refills: 11    Associated Diagnoses: Chronic obstructive pulmonary disease, unspecified COPD type      folic acid (FOLVITE) 1 MG tablet Take 1 tablet (1 mg total) by mouth once daily.  Qty: 90 tablet, Refills: 3      furosemide (LASIX) 40 MG tablet Take 1 tablet (40 mg total) by mouth once daily.  Qty: 90 tablet, Refills: 3      gabapentin (NEURONTIN) 100 MG capsule Take 1 capsule (100 mg total) by mouth 3 (three) times daily.  Qty: 270 capsule, Refills: 11      LINZESS 145 mcg Cap capsule Take 145 mcg by mouth once daily.      lisinopriL-hydrochlorothiazide (PRINZIDE,ZESTORETIC) 20-12.5 mg per tablet Take 2 tablets by mouth once daily.  Qty: 180 tablet, Refills: 11    Comments: .      mesalamine (LIALDA) 1.2 gram TbEC Take 2.4 g by mouth.      RABEprazole (ACIPHEX) 20 mg tablet Take 20 mg by mouth once daily.      rOPINIRole (REQUIP) 0.5 MG tablet Take 1 tablet (0.5 mg total) by mouth every evening.  Qty: 90 tablet, Refills: 3      sertraline (ZOLOFT) 100 MG tablet Take 2 tablets (200 mg total) by mouth once daily.  Qty: 180 tablet, Refills: 3      sucralfate (CARAFATE) 1 gram tablet sucralfate 1 gram  tablet      albuterol-ipratropium (DUO-NEB) 2.5 mg-0.5 mg/3 mL nebulizer solution Take 3 mLs by nebulization every 6 (six) hours as needed for Wheezing or Shortness of Breath. Rescue  Qty: 180 mL, Refills: 2    Associated Diagnoses: Centrilobular emphysema      cyanocobalamin, vitamin B-12, 1,000 mcg Subl Place 1,000 mcg under the tongue once daily.  Qty: 90 tablet, Refills: 3      ergocalciferol (ERGOCALCIFEROL) 50,000 unit Cap Vitamin D2 1,250 mcg (50,000 unit) capsule   Take 1 capsule every week by oral route.      fluticasone propionate (FLONASE) 50 mcg/actuation nasal spray 1 spray by Each Nostril route 2 (two) times daily.      ibuprofen (ADVIL,MOTRIN) 800 MG tablet Take 1 tablet (800 mg total) by mouth every 8 (eight) hours as needed for Pain.  Qty: 30 tablet, Refills: 1      nicotine (NICODERM CQ) 21 mg/24 hr Place 2 patches onto the skin once daily.  Qty: 60 patch, Refills: 1                 Discharge Diagnosis: Localized osteoarthrosis of left hip [M16.12]  Condition on Discharge: Stable with no complications to procedure   Diet on Discharge: Same as before.  Activity: as per instruction sheet.  Discharge to: Home with a responsible adult.  Follow up: 2-4 weeks       Please call the office at [phone] if you experience any weakness or loss of sensation, fever > 101.5, pain uncontrolled with oral medications, persistent nausea/vomiting/or diarrhea, redness or drainage from the incisions, or any other worrisome concerns. If physician on call was not reached or could not communicate with our office for any reason please go to the nearest emergency department

## 2024-08-28 NOTE — OP NOTE
Left Hip Injection/Arthrogram  ANTERIOR APPROACH    INFORMED CONSENT: The procedure, risks, benefits and options were discussed with patient. There are no contraindications to the procedure. The patient expressed understanding and agreed to proceed. The personnel performing the procedure was discussed.    Date of procedure 08/28/2024    Time-out taken to identify patient and procedure side prior to starting the procedure.                                                                                                            PROCEDURE:  Left hip joint injection under fluoroscopy.      REASON FOR PROCEDURE: Localized osteoarthrosis of left hip [M16.12]     1. Localized osteoarthrosis of left hip      POSTOP DIAGNOSIS: Localized osteoarthrosis of left hip [M16.12]  1. Localized osteoarthrosis of left hip        PHYSICIAN: Mario Alberto Eagle MD    ASSISTANTS: None                                                                                   ANESTHESIA:  3ml Lidocaine 1% PF                                                                                                     MEDICATIONS INJECTED: 3ml Lidocaine 1% PF and 1ml of DepoMedrol 40mg/ml                                                                                                                                 ESTIMATED BLOOD LOSS:  None.                                                                                                                              COMPLICATIONS:  None.     Sedation: Conscious sedation provided by M.D    SEDATION MEDICATIONS: local/IV sedation: Versed 2 mg and fentanyl 50 mcg IV.  Conscious sedation ordered by MD.  Patient reevaluated and sedation administered by MD and monitored by RN.  Total sedation time was less than 10 min.                                                                                     TECHNIQUE:  With the patient lying in the supine position the the  Left  femoral neck identified under fluoroscopy.   The area was prepped and draped in the usual       sterile fashion.  Local anesthetic was used, given by raising a wheal and going down to the hub of the 27-gauge needle.  A 3-1/2 inch 22-gauge needle was introduced under fluoroscopy until the tip reached the lateral aspect of the femoral neck. When the tip of the needle was thought   to be in appropriate position, contrast was injected to confirm proper placement.  Medication was then injected slowly.  The patient tolerated the procedure well.                                                                                                                      The patient was monitored for 20-30 minutes after the procedure and was given post procedure and discharge instructions to follow at home.  The      patient is to follow-up with me in two weeks by calling. The patient was discharged in a stable condtion.

## 2024-09-09 NOTE — PROGRESS NOTES
Patient ID: Umu Clayton  YOB: 1960  MRN: [id number]    Chief Complaint: Pain of the Left Hip    Referred By: Dr Anahi Villegas MD    Occupation: homemaker      History of Present Illness: Umu Clayton is a 64 y.o. female who presents today with Pain of the Left Hip    She was initially evaluated in our office on 8/12/24 with acute left hip and pelvic pain.  She was diagnosed with left hip OA and referred to pain management for a left hip joint injection which was completed on 8/28/24.  She was prescribed ibuprofen 800mg and tramadol 50mg.     Today, she reports she had mild pain relief following the joint injection for a couple of days, then pain came back.  Pain has now at pre-injection levels, if not more severe.  Tramadol did not really help.  She has continued taking ibuprofen 800 mg.      HPI 8/12/24:  Patient reports acute onset left-sided hip pelvic pain.  Pain started approx.  2 weeks ago.  Denies any inciting injury or trauma.  Pain is localized to the anterior and lateral aspect of her upper thigh and groin.  Describes the pain as at different times aching, throbbing, sharp, burning, shooting, and stinging.  She has noticed significant weakness, feelings of numbness, and feeling unstable underlying.  Pain is worse with walking, any kind of weight-bearing, needed time she is trying to turn over, go from a sitting to a standing position.  Pain also occurs at night when she is trying to sleep.  She has tried Tylenol, naproxen, gabapentin.  She has also tried.    She went to the emergency room on 07/31, x-rays and CT negative at that time.  She was given IM Toradol and dexamethasone injections, which helped little bit.  She was then discharged with oral Toradol prescription, which did not help at all.  Denies any prior incidents like this.  Has had low back pain in the past, but no sciatica or radiculopathy.      Past Medical History:   Past Medical History:   Diagnosis Date    Anxiety      Emphysema lung     GERD (gastroesophageal reflux disease)     History of stomach ulcers     Hypertension     Restless leg syndrome     Ulcerative colitis      Past Surgical History:   Procedure Laterality Date    gastric by pass      HERNIA REPAIR      HYSTERECTOMY      INJECTION OF JOINT Left 2024    Procedure: Left IA Hip Joint injection;  Surgeon: Mario Alberto Eagle MD;  Location: [facility] [place];  Service: Pain Management;  Laterality: Left;    OOPHORECTOMY Right      Family History   Problem Relation Name Age of Onset    COPD Father Ced Ely     Lung cancer Father Ced Ely     Colon cancer Maternal Grandmother Sara vizcarra         Colon cancer    COPD Mother Ethel Ely             Multiple sclerosis Son      Heart disease Neg Hx      Stroke Neg Hx       Social History     Socioeconomic History    Marital status:    Tobacco Use    Smoking status: Every Day     Current packs/day: 2.50     Average packs/day: 2.5 packs/day for 49.7 years (124.2 ttl pk-yrs)     Types: Cigarettes     Start date:     Smokeless tobacco: Never    Tobacco comments:     Enrolled in the [employer] on 21 ([facility] Member ID # [id number]). Ambulatory referral to Smoking Cessation program.    Substance and Sexual Activity    Alcohol use: Never    Drug use: No    Sexual activity: Yes     Partners: Male     Birth control/protection: None     Social Determinants of Health     Financial Resource Strain: Low Risk  (2024)    Overall Financial Resource Strain (CARDIA)     Difficulty of Paying Living Expenses: Not very hard   Food Insecurity: No Food Insecurity (2024)    Hunger Vital Sign     Worried About Running Out of Food in the Last Year: Never true     Ran Out of Food in the Last Year: Never true   Transportation Needs: No Transportation Needs (2024)    PRAPARE - Transportation     Lack of Transportation (Medical): No     Lack of Transportation (Non-Medical): No   Physical Activity:  Inactive (8/6/2024)    Exercise Vital Sign     Days of Exercise per Week: 1 day     Minutes of Exercise per Session: 0 min   Stress: Stress Concern Present (8/6/2024)    [employer] [place] [employer] - Occupational Stress Questionnaire     Feeling of Stress : To some extent   Housing Stability: Low Risk  (1/12/2024)    Housing Stability Vital Sign     Unable to Pay for Housing in the Last Year: No     Number of Places Lived in the Last Year: 1     Unstable Housing in the Last Year: No     Medication List with Changes/Refills   New Medications    HYDROCODONE-ACETAMINOPHEN (NORCO) 5-325 MG PER TABLET    Take 1 tablet by mouth every 6 (six) hours as needed for Pain.   Current Medications    ALBUTEROL-IPRATROPIUM (DUO-NEB) 2.5 MG-0.5 MG/3 ML NEBULIZER SOLUTION    Take 3 mLs by nebulization every 6 (six) hours as needed for Wheezing or Shortness of Breath. Rescue    BACLOFEN (LIORESAL) 10 MG TABLET    Take 1 tablet (10 mg total) by mouth nightly as needed (msucle pain).    CETIRIZINE (ZYRTEC) 10 MG TABLET    Take 10 mg by mouth once daily.    CYANOCOBALAMIN, VITAMIN B-12, 1,000 MCG SUBL    Place 1,000 mcg under the tongue once daily.    ERGOCALCIFEROL (ERGOCALCIFEROL) 50,000 UNIT CAP    Vitamin D2 1,250 mcg (50,000 unit) capsule   Take 1 capsule every week by oral route.    FLUTICASONE PROPIONATE (FLONASE) 50 MCG/ACTUATION NASAL SPRAY    1 spray by Each Nostril route 2 (two) times daily.    FLUTICASONE-UMECLIDIN-VILANTER (TRELEGY ELLIPTA) 200-62.5-25 MCG INHALER    Inhale 1 puff into the lungs once daily.    FOLIC ACID (FOLVITE) 1 MG TABLET    Take 1 tablet (1 mg total) by mouth once daily.    FUROSEMIDE (LASIX) 40 MG TABLET    Take 1 tablet (40 mg total) by mouth once daily.    GABAPENTIN (NEURONTIN) 100 MG CAPSULE    Take 1 capsule (100 mg total) by mouth 3 (three) times daily.    IBUPROFEN (ADVIL,MOTRIN) 800 MG TABLET    Take 1 tablet (800 mg total) by mouth every 8 (eight) hours as needed for Pain.     LINZESS 145 MCG CAP CAPSULE    Take 145 mcg by mouth once daily.    LISINOPRIL-HYDROCHLOROTHIAZIDE (PRINZIDE,ZESTORETIC) 20-12.5 MG PER TABLET    Take 2 tablets by mouth once daily.    MESALAMINE (LIALDA) 1.2 GRAM TBEC    Take 2.4 g by mouth.    NICOTINE (NICODERM CQ) 21 MG/24 HR    Place 2 patches onto the skin once daily.    RABEPRAZOLE (ACIPHEX) 20 MG TABLET    Take 20 mg by mouth once daily.    ROPINIROLE (REQUIP) 0.5 MG TABLET    Take 1 tablet (0.5 mg total) by mouth every evening.    SERTRALINE (ZOLOFT) 100 MG TABLET    Take 2 tablets (200 mg total) by mouth once daily.    SUCRALFATE (CARAFATE) 1 GRAM TABLET    sucralfate 1 gram tablet     Review of patient's allergies indicates:   Allergen Reactions    Penicillins Hives       Physical Exam:   There is no height or weight on file to calculate BMI.    Physical Exam  Detailed MSK exam:     Left Hip:    Inspection: normal    Palpation tenderness: Anterior    Range of motion: 80 deg Flexion       20 deg IR       40 deg ER    Strength:  5-/5 Flexion    5-/5 Abduction    4/5 Adduction    Impingement Tests: Positive SHERRILL     Positive FADIR    Other Tests:  Positive Log Roll  Positive Circumduction  Negative Piriformis     Negative Jody's     Equivocal SLR     Equivocal Slump test    N/V Exam:  Tibial:    Normal sensory (plantar foot)  Normal motor (FHL)    Sup Peroneal:  Normal sensory (dorsal foot)  Normal motor (Peroneals)            Deep Peroneal:  Normal sensory (1st web space) Normal motor (EHL)    Sural:   Normal sensory (lateral foot)   Saphenous:   Normal sensory (medial lower leg)   Normal pedal pulses, warm and well perfused with capillary refill < 2 sec         Imaging:   XR Results:  Results for orders placed during the hospital encounter of 07/31/24    X-Ray Hip 2 or 3 views Left with Pelvis when performed    Narrative  EXAMINATION:  XR HIP WITH PELVIS WHEN PERFORMED 2 OR 3 VIEWS LEFT    CLINICAL HISTORY:  severe pain, cannot bear  weight;    TECHNIQUE:  AP view of the pelvis and frog leg lateral view of the left hip were performed.    COMPARISON:  None    FINDINGS:  No fracture or dislocation.  Mild degenerative joint disease    Impression  As above      Electronically signed by: Gus Maddox  Date:    07/31/2024  Time:    20:03      CT Results:  Results for orders placed during the hospital encounter of 07/31/24    CT Hip Without Contrast Left    Narrative  EXAMINATION:  CT HIP WITHOUT CONTRAST LEFT    CLINICAL HISTORY:  Hip pain, stress fracture suspected, neg xray;    TECHNIQUE:  CT of the left hip with sagittal and coronal reformations without contrast.    COMPARISON:  Prior radiograph    FINDINGS:  No acute fracture or dislocation.  Minimal mild degenerative joint disease.  Fluid adjacent to the anterior musculature of the thigh may relate to bursal fluid or other.  Recommend clinical correlation and follow-up.  Consider follow-up MRI for further evaluation.    Impression  As above    All CT scans   are performed using dose optimization techniques including the following: automated exposure control; adjustment of the mA and/or kV; use of iterative reconstruction technique.  Dose modulation was employed for ALARA by means of: Automated exposure control; adjustment of the mA and/or kV according to patient size (this includes techniques or standardized protocols for targeted exams where dose is matched to indication/reason for exam; i.e. extremities or head); and/or use of iterative reconstructive technique.      Electronically signed by: Gus Maddox  Date:    07/31/2024  Time:    21:25        Relevant imaging results were reviewed and interpreted by me and per my read:  Mild degenerative changes about the left hip.  Normal alignment.  Evidence of AVN.  No fractures or other acute abnormalities.    This was discussed with the patient and / or family today.     Patient Instructions   Assessment:  Umu Clayton is a 64 y.o. female with a  chief complaint of Pain of the Left Hip    Encounter Diagnoses   Name Primary?    Acute hip pain, left Yes    Primary osteoarthritis of left hip     Pain of left hip       Plan:  Patient with persistent acute left hip pain.  Without any trauma or inciting injury.  Only had a couple of days of mild pain relief following intra-articular corticosteroid injection with pain management.  Now back at pre-injection levels, if not worse.  XR and CT relatively nondiagnostic.  Recommend MRI of the left hip for better evaluation, evaluation for possible avascular necrosis/osteonecrosis, or other pathology, as pain has out of proportion to imaging.  Has not improved with NSAIDs, p.o. Toradol, IM Toradol, tramadol.  We will give short course of Norco 5-325, which can be taken every 6 hours as needed for pain.  Can continue ibuprofen 800 mg, every 6-8 hours as needed.  Plan for telehealth follow up visit after MRI to discuss results, determine plan of care.    Follow-up:  After MRI or sooner if there are any problems between now and then.    Thank you for choosing [facility] and Dr. Hola Duke for your orthopedic & sports medicine care. It is our goal to provide you with exceptional care that will help keep you healthy, active, and get you back in the game.    Please do not hesitate to reach out to us via email, phone, or MyChart with any questions, concerns, or feedback.    If you are experiencing pain/discomfort ,or have questions after 5pm and would like to be connected to the [facility] on-call team, please call this number and specify which Sports Medicine provider is treating you: [phone]       A copy of today's visit note has been sent to the referring provider.           Hola Duke MD  Primary Care Sports Medicine    Disclaimer: This note was prepared using a voice recognition system and is likely to have sound alike errors within the text.

## 2024-09-10 ENCOUNTER — HOSPITAL ENCOUNTER (OUTPATIENT)
Dept: RADIOLOGY | Facility: HOSPITAL | Age: 64
Discharge: HOME | End: 2024-09-10
Attending: STUDENT IN AN ORGANIZED HEALTH CARE EDUCATION/TRAINING PROGRAM
Payer: COMMERCIAL

## 2024-09-10 ENCOUNTER — OFFICE VISIT (OUTPATIENT)
Dept: SPORTS MEDICINE | Facility: CLINIC | Age: 64
End: 2024-09-10
Payer: COMMERCIAL

## 2024-09-10 DIAGNOSIS — M25.552 PAIN OF LEFT HIP: ICD-10-CM

## 2024-09-10 DIAGNOSIS — M16.12 PRIMARY OSTEOARTHRITIS OF LEFT HIP: ICD-10-CM

## 2024-09-10 DIAGNOSIS — M25.552 ACUTE HIP PAIN, LEFT: Primary | ICD-10-CM

## 2024-09-10 PROCEDURE — 99214 OFFICE O/P EST MOD 30 MIN: CPT | Mod: S$GLB,,, | Performed by: STUDENT IN AN ORGANIZED HEALTH CARE EDUCATION/TRAINING PROGRAM

## 2024-09-10 PROCEDURE — 4010F ACE/ARB THERAPY RXD/TAKEN: CPT | Mod: CPTII,S$GLB,, | Performed by: STUDENT IN AN ORGANIZED HEALTH CARE EDUCATION/TRAINING PROGRAM

## 2024-09-10 PROCEDURE — 73721 MRI JNT OF LWR EXTRE W/O DYE: CPT | Mod: TC,PN,LT

## 2024-09-10 PROCEDURE — 73721 MRI JNT OF LWR EXTRE W/O DYE: CPT | Mod: 26,LT,, | Performed by: RADIOLOGY

## 2024-09-10 PROCEDURE — 99999 PR PBB SHADOW E&M-EST. PATIENT-LVL III: CPT | Mod: PBBFAC,,, | Performed by: STUDENT IN AN ORGANIZED HEALTH CARE EDUCATION/TRAINING PROGRAM

## 2024-09-10 PROCEDURE — 1159F MED LIST DOCD IN RCRD: CPT | Mod: CPTII,S$GLB,, | Performed by: STUDENT IN AN ORGANIZED HEALTH CARE EDUCATION/TRAINING PROGRAM

## 2024-09-10 RX ORDER — HYDROCODONE BITARTRATE AND ACETAMINOPHEN 5; 325 MG/1; MG/1
1 TABLET ORAL EVERY 6 HOURS PRN
Qty: 28 TABLET | Refills: 0 | Status: SHIPPED | OUTPATIENT
Start: 2024-09-10 | End: 2024-09-13 | Stop reason: DRUGHIGH

## 2024-09-10 NOTE — PATIENT INSTRUCTIONS
Assessment:  Umu Clayton is a 64 y.o. female with a chief complaint of Pain of the Left Hip    Encounter Diagnoses   Name Primary?    Acute hip pain, left Yes    Primary osteoarthritis of left hip     Pain of left hip       Plan:  Patient with persistent acute left hip pain.  Without any trauma or inciting injury.  Only had a couple of days of mild pain relief following intra-articular corticosteroid injection with pain management.  Now back at pre-injection levels, if not worse.  XR and CT relatively nondiagnostic.  Recommend MRI of the left hip for better evaluation, evaluation for possible avascular necrosis/osteonecrosis, or other pathology, as pain has out of proportion to imaging.  Has not improved with NSAIDs, p.o. Toradol, IM Toradol, tramadol.  We will give short course of Norco 5-325, which can be taken every 6 hours as needed for pain.  Can continue ibuprofen 800 mg, every 6-8 hours as needed.  Plan for telehealth follow up visit after MRI to discuss results, determine plan of care.    Follow-up:  After MRI or sooner if there are any problems between now and then.    Thank you for choosing [facility] and Dr. Hola Duke for your orthopedic & sports medicine care. It is our goal to provide you with exceptional care that will help keep you healthy, active, and get you back in the game.    Please do not hesitate to reach out to us via email, phone, or MyChart with any questions, concerns, or feedback.    If you are experiencing pain/discomfort ,or have questions after 5pm and would like to be connected to the [facility] on-call team, please call this number and specify which Sports Medicine provider is treating you: [phone]

## 2024-09-12 NOTE — PROGRESS NOTES
Telehealth video follow up visit    Patient ID: Umu Clayton  YOB: 1960  MRN: [id number]    Chief Complaint: MRI review    Referred By: Dr Anahi Villegas MD    Occupation: homemaker      History of Present Illness: Umu Clayton is a 64 y.o. female who presents today with MRI review    She was initially evaluated in our office on 24 with acute left hip and pelvic pain.  She was diagnosed with left hip OA and referred to pain management for a left hip joint injection which was completed on 24.  She was prescribed ibuprofen 800mg and tramadol 50mg.  She had mild temporary pain relief.  At her last visit on 9/10/24, she was referred for an MRI of the left hip.    Today, she reports no significant improvement in her pain.  She has been taking either for the last several days.  Any time she moves or walks she has significant pain in the left hip.    Past Medical History:   Past Medical History:   Diagnosis Date    Anxiety     Emphysema lung     GERD (gastroesophageal reflux disease)     History of stomach ulcers     Hypertension     Restless leg syndrome     Ulcerative colitis      Past Surgical History:   Procedure Laterality Date    gastric by pass      HERNIA REPAIR      HYSTERECTOMY      INJECTION OF JOINT Left 2024    Procedure: Left IA Hip Joint injection;  Surgeon: Mario Alberto Eagle MD;  Location: [facility];  Service: Pain Management;  Laterality: Left;    OOPHORECTOMY Right      Family History   Problem Relation Name Age of Onset    COPD Father Ced Ely     Lung cancer Father Ced Ely     Colon cancer Maternal Grandmother Sara vizcarra         Colon cancer    COPD Mother Ethel Ely             Multiple sclerosis Son      Heart disease Neg Hx      Stroke Neg Hx       Social History     Socioeconomic History    Marital status:    Tobacco Use    Smoking status: Every Day     Current packs/day: 2.50     Average packs/day: 2.5 packs/day for 49.7 years  (124.2 ttl pk-yrs)     Types: Cigarettes     Start date: 1975    Smokeless tobacco: Never    Tobacco comments:     Enrolled in the [employer] on 7/1/21 ([facility] Member ID # [id number]). Ambulatory referral to Smoking Cessation program.    Substance and Sexual Activity    Alcohol use: Never    Drug use: No    Sexual activity: Yes     Partners: Male     Birth control/protection: None     Social Determinants of Health     Financial Resource Strain: Low Risk  (8/6/2024)    Overall Financial Resource Strain (CARDIA)     Difficulty of Paying Living Expenses: Not very hard   Food Insecurity: No Food Insecurity (8/6/2024)    Hunger Vital Sign     Worried About Running Out of Food in the Last Year: Never true     Ran Out of Food in the Last Year: Never true   Transportation Needs: No Transportation Needs (1/12/2024)    PRAPARE - Transportation     Lack of Transportation (Medical): No     Lack of Transportation (Non-Medical): No   Physical Activity: Inactive (8/6/2024)    Exercise Vital Sign     Days of Exercise per Week: 1 day     Minutes of Exercise per Session: 0 min   Stress: Stress Concern Present (8/6/2024)    [employer] - Occupational Stress Questionnaire     Feeling of Stress : To some extent   Housing Stability: Low Risk  (1/12/2024)    Housing Stability Vital Sign     Unable to Pay for Housing in the Last Year: No     Number of Places Lived in the Last Year: 1     Unstable Housing in the Last Year: No     Medication List with Changes/Refills   New Medications    HYDROCODONE-ACETAMINOPHEN (NORCO)  MG PER TABLET    Take 1 tablet by mouth every 8 (eight) hours as needed for Pain.    IBUPROFEN (ADVIL,MOTRIN) 800 MG TABLET    Take 1 tablet (800 mg total) by mouth every 8 (eight) hours as needed for Pain.   Current Medications    ALBUTEROL-IPRATROPIUM (DUO-NEB) 2.5 MG-0.5 MG/3 ML NEBULIZER SOLUTION    Take 3 mLs by nebulization every 6 (six) hours as needed for Wheezing or Shortness of  Breath. Rescue    BACLOFEN (LIORESAL) 10 MG TABLET    Take 1 tablet (10 mg total) by mouth nightly as needed (msucle pain).    CETIRIZINE (ZYRTEC) 10 MG TABLET    Take 10 mg by mouth once daily.    CYANOCOBALAMIN, VITAMIN B-12, 1,000 MCG SUBL    Place 1,000 mcg under the tongue once daily.    ERGOCALCIFEROL (ERGOCALCIFEROL) 50,000 UNIT CAP    Vitamin D2 1,250 mcg (50,000 unit) capsule   Take 1 capsule every week by oral route.    FLUTICASONE PROPIONATE (FLONASE) 50 MCG/ACTUATION NASAL SPRAY    1 spray by Each Nostril route 2 (two) times daily.    FLUTICASONE-UMECLIDIN-VILANTER (TRELEGY ELLIPTA) 200-62.5-25 MCG INHALER    Inhale 1 puff into the lungs once daily.    FOLIC ACID (FOLVITE) 1 MG TABLET    Take 1 tablet (1 mg total) by mouth once daily.    FUROSEMIDE (LASIX) 40 MG TABLET    Take 1 tablet (40 mg total) by mouth once daily.    GABAPENTIN (NEURONTIN) 100 MG CAPSULE    Take 1 capsule (100 mg total) by mouth 3 (three) times daily.    LINZESS 145 MCG CAP CAPSULE    Take 145 mcg by mouth once daily.    LISINOPRIL-HYDROCHLOROTHIAZIDE (PRINZIDE,ZESTORETIC) 20-12.5 MG PER TABLET    Take 2 tablets by mouth once daily.    MESALAMINE (LIALDA) 1.2 GRAM TBEC    Take 2.4 g by mouth.    NICOTINE (NICODERM CQ) 21 MG/24 HR    Place 2 patches onto the skin once daily.    RABEPRAZOLE (ACIPHEX) 20 MG TABLET    Take 20 mg by mouth once daily.    ROPINIROLE (REQUIP) 0.5 MG TABLET    Take 1 tablet (0.5 mg total) by mouth every evening.    SERTRALINE (ZOLOFT) 100 MG TABLET    Take 2 tablets (200 mg total) by mouth once daily.    SUCRALFATE (CARAFATE) 1 GRAM TABLET    sucralfate 1 gram tablet   Discontinued Medications    HYDROCODONE-ACETAMINOPHEN (NORCO) 5-325 MG PER TABLET    Take 1 tablet by mouth every 6 (six) hours as needed for Pain.    IBUPROFEN (ADVIL,MOTRIN) 800 MG TABLET    Take 1 tablet (800 mg total) by mouth every 8 (eight) hours as needed for Pain.     Review of patient's allergies indicates:   Allergen Reactions     Penicillins Hives       Physical Exam:   There is no height or weight on file to calculate BMI.    Physical Exam  Constitutional:       General: She is not in acute distress.     Appearance: Normal appearance.   HENT:      Head: Normocephalic and atraumatic.   Eyes:      Extraocular Movements: Extraocular movements intact.      Conjunctiva/sclera: Conjunctivae normal.   Pulmonary:      Effort: Pulmonary effort is normal. No respiratory distress.   Neurological:      General: No focal deficit present.      Mental Status: She is alert and oriented to person, place, and time.   Psychiatric:         Mood and Affect: Mood normal.       Detailed MSK exam:     PRIOR MSK EXAM 9/10/24  Left Hip:  Inspection: normal  Palpation tenderness: Anterior  Range of motion: 80 deg Flexion       20 deg IR       40 deg ER  Strength:  5-/5 Flexion    5-/5 Abduction    4/5 Adduction  Impingement Tests: Positive SHERRILL     Positive FADIR  Other Tests:  Positive Log Roll  Positive Circumduction  Negative Piriformis     Negative Jody's     Equivocal SLR     Equivocal Slump test  N/V Exam:  Tibial:    Normal sensory (plantar foot)  Normal motor (FHL)    Sup Peroneal:  Normal sensory (dorsal foot)  Normal motor (Peroneals)            Deep Peroneal:  Normal sensory (1st web space) Normal motor (EHL)    Sural:   Normal sensory (lateral foot)   Saphenous:   Normal sensory (medial lower leg)   Normal pedal pulses, warm and well perfused with capillary refill < 2 sec     Imaging:    MRI Hip Without Contrast Left  Narrative: EXAM:  MRI HIP WITHOUT CONTRAST LEFT    CLINICAL HISTORY: Left hip pain. Hip pain, osteonecrosis suspected, xray done; [M25.552]-Pain in left hip.    TECHNIQUE: Standard multiplanar, multisequence MR imaging protocol of the left hip performed.    FINDINGS:  Suspected nondisplaced subchondral fracture of the superior lateral and posterior superior left femoral head with possible extension into the subcapital femoral neck and  prominent surrounding marrow edema.  No definitive evidence of superimposed avascular necrosis.  Moderate left hip joint effusion with periarticular soft tissue edema extending into the regional musculature.  Prominent left iliopsoas bursitis.   Moderate to advanced left hip osteoarthritis with extensive high-grade and full-thickness chondral loss, subchondral marrow edema and subchondral cystlike change.  Normal joint alignment.  Degenerative nondisplaced tearing of the anterior and superior labrum.  Normal joint alignment.  No other fractures identified.  The muscles and tendons appear intact.  Impression: 1.   Suspected nondisplaced subchondral fracture of the left femoral head with questionable extension into the subcapital femoral neck with prominent surrounding marrow edema and regional soft tissue edema.  Moderate to severe left hip osteoarthritis with grade 3 and 4 chondral loss.  Moderate left hip joint effusion and prominent left iliopsoas bursitis.  2. No evidence of avascular necrosis.    Finalized on: 9/10/2024 2:27 PM By:  Anoop Burr MD  [facility]# [id number]      2024-09-10 15:15:39.947    [facility]    Relevant imaging results were reviewed and interpreted by me and per my read:  Stress fracture of the femoral head with extension to femoral neck.  No AVN.  No fractures.  Severe arthritis with chondral loss.    This was discussed with the patient and / or family today.     Patient Instructions   Assessment:  Umu Clayton is a 64 y.o. female with a chief complaint of MRI review    Encounter Diagnoses   Name Primary?    Stress fracture of left hip Yes    Bursitis of left hip, unspecified bursa     Primary osteoarthritis of left hip     Acute hip pain, left       Plan:  MRI reviewed  Stress fracture of the femoral head, with possible extension to the femoral neck  Severe arthritis and chondral loss  No AVN, no acute fractures  Findings reviewed, as well as diagnosis, treatment options, prognosis.    Recommend limited  weight-bearing for the next 4-6 weeks, to let the stress fracture try to heal.  Prescription placed for a walker to help with ambulation around the house.  Order sent to patient through [employer].  [facility] in [address] [place] [address]  [phone]  [email or web address]  Recommend to limit weight-bearing, or use a wheelchair as often as you are able.  We will increase prescription to Norco  mg.  Take 1 tablet every 8 hours as needed for pain and discomfort.  Refill ibuprofen 800 mg.  Take 1 tablet every 8 hours as needed for pain and discomfort.    Okay to take with Norco for synergistic pain relieving affect.  Maintain adequate hydration  If we are unable to control the pain with rest and offloading, then may warrant evaluation for total hip replacement    Follow-up:  4 weeks or sooner if there are any problems between now and then.    Thank you for choosing [facility] and Dr. Hola Duke for your orthopedic & sports medicine care. It is our goal to provide you with exceptional care that will help keep you healthy, active, and get you back in the game.    Please do not hesitate to reach out to us via email, phone, or [employer] with any questions, concerns, or feedback.    If you are experiencing pain/discomfort ,or have questions after 5pm and would like to be connected to the [facility] on-call team, please call this number and specify which Sports Medicine provider is treating you: [phone]       A copy of today's visit note has been sent to the referring provider.           Hola Duke MD  Primary Care Sports Medicine    Disclaimer: This note was prepared using a voice recognition system and is likely to have sound alike errors within the text.

## 2024-09-13 ENCOUNTER — PATIENT MESSAGE (OUTPATIENT)
Dept: INTERNAL MEDICINE | Facility: CLINIC | Age: 64
End: 2024-09-13
Payer: COMMERCIAL

## 2024-09-13 ENCOUNTER — PATIENT MESSAGE (OUTPATIENT)
Dept: SPORTS MEDICINE | Facility: CLINIC | Age: 64
End: 2024-09-13

## 2024-09-13 ENCOUNTER — OFFICE VISIT (OUTPATIENT)
Dept: SPORTS MEDICINE | Facility: CLINIC | Age: 64
End: 2024-09-13
Payer: COMMERCIAL

## 2024-09-13 DIAGNOSIS — M25.552 ACUTE HIP PAIN, LEFT: ICD-10-CM

## 2024-09-13 DIAGNOSIS — M16.12 PRIMARY OSTEOARTHRITIS OF LEFT HIP: ICD-10-CM

## 2024-09-13 DIAGNOSIS — M70.72 BURSITIS OF LEFT HIP, UNSPECIFIED BURSA: ICD-10-CM

## 2024-09-13 DIAGNOSIS — M84.352A STRESS FRACTURE OF LEFT HIP: Primary | ICD-10-CM

## 2024-09-13 RX ORDER — HYDROCODONE BITARTRATE AND ACETAMINOPHEN 10; 325 MG/1; MG/1
1 TABLET ORAL EVERY 8 HOURS PRN
Qty: 30 TABLET | Refills: 0 | Status: SHIPPED | OUTPATIENT
Start: 2024-09-13

## 2024-09-13 RX ORDER — IBUPROFEN 800 MG/1
800 TABLET ORAL EVERY 8 HOURS PRN
Qty: 30 TABLET | Refills: 1 | Status: SHIPPED | OUTPATIENT
Start: 2024-09-13

## 2024-09-13 NOTE — PATIENT INSTRUCTIONS
Assessment:  Umu Clayton is a 64 y.o. female with a chief complaint of MRI review    Encounter Diagnoses   Name Primary?    Stress fracture of left hip Yes    Bursitis of left hip, unspecified bursa     Primary osteoarthritis of left hip     Acute hip pain, left       Plan:  MRI reviewed  Stress fracture of the femoral head, with possible extension to the femoral neck  Severe arthritis and chondral loss  No AVN, no acute fractures  Findings reviewed, as well as diagnosis, treatment options, prognosis.    Recommend limited weight-bearing for the next 4-6 weeks, to let the stress fracture try to heal.  Prescription placed for a walker to help with ambulation around the house.  Order sent to patient through [employer].  [employer] [facility] in [address] [place] [address]  [phone]  [email or web address]  Recommend to limit weight-bearing, or use a wheelchair as often as you are able.  We will increase prescription to Norco  mg.  Take 1 tablet every 8 hours as needed for pain and discomfort.  Refill ibuprofen 800 mg.  Take 1 tablet every 8 hours as needed for pain and discomfort.    Okay to take with Norco for synergistic pain relieving affect.  Maintain adequate hydration  If we are unable to control the pain with rest and offloading, then may warrant evaluation for total hip replacement    Follow-up:  4 weeks or sooner if there are any problems between now and then.    Thank you for choosing [facility] and Dr. Hola Duke for your orthopedic & sports medicine care. It is our goal to provide you with exceptional care that will help keep you healthy, active, and get you back in the game.    Please do not hesitate to reach out to us via email, phone, or [employer] with any questions, concerns, or feedback.    If you are experiencing pain/discomfort ,or have questions after 5pm and would like to be connected to the [facility]  on-call team, please call this number and specify which Sports Medicine provider is treating you: [phone]

## 2024-09-18 RX ORDER — FUROSEMIDE 40 MG/1
40 TABLET ORAL
Qty: 90 TABLET | Refills: 3 | Status: SHIPPED | OUTPATIENT
Start: 2024-09-18

## 2024-09-18 RX ORDER — ROPINIROLE 0.5 MG/1
0.5 TABLET, FILM COATED ORAL NIGHTLY
Qty: 90 TABLET | Refills: 3 | Status: SHIPPED | OUTPATIENT
Start: 2024-09-18

## 2024-09-18 RX ORDER — SERTRALINE HYDROCHLORIDE 100 MG/1
200 TABLET, FILM COATED ORAL
Qty: 180 TABLET | Refills: 3 | Status: SHIPPED | OUTPATIENT
Start: 2024-09-18

## 2024-09-18 NOTE — TELEPHONE ENCOUNTER
No care due was identified.  [facility] Embedded Care Due Messages. Reference number: [id number].   9/17/2024 11:31:38 PM CDT

## 2024-09-23 ENCOUNTER — TELEPHONE (OUTPATIENT)
Dept: SPORTS MEDICINE | Facility: CLINIC | Age: 64
End: 2024-09-23
Payer: COMMERCIAL

## 2024-09-23 NOTE — TELEPHONE ENCOUNTER
----- Message from Aruna Davalos sent at 9/23/2024  9:38 AM CDT -----  Contact: Umu  Type:  Needs Medical Advice    Who Called: Umu  Symptoms (please be specific): severe L hip pain   How long has patient had these symptoms:  a month  Pharmacy name and phone #:      [facility]  [address]  [address]  [place] [address]  Phone: [phone] Fax: [phone]     Would the patient rather a call back or a response via [facility]? call  Best Call Back Number: [phone]   Additional Information:

## 2024-09-23 NOTE — TELEPHONE ENCOUNTER
Called pt back, she reports having only 1 norco left and that she has been taking ibu 800 and norco 10 every 8 hours and adding a half tablet of norco between each dose without any relief.  Pt reports using walker and wheelchair as well with continued pain.  Pt cannot wait until follow up in October to discuss next option, would like a medication refill and possibly discuss surgery if still warranted.  Notified pt I would ask Dr. Duke and let her know what he would like to do next.

## 2024-09-25 ENCOUNTER — PATIENT MESSAGE (OUTPATIENT)
Dept: SPORTS MEDICINE | Facility: CLINIC | Age: 64
End: 2024-09-25
Payer: COMMERCIAL

## 2024-09-25 ENCOUNTER — TELEPHONE (OUTPATIENT)
Dept: SPORTS MEDICINE | Facility: CLINIC | Age: 64
End: 2024-09-25
Payer: COMMERCIAL

## 2024-09-25 DIAGNOSIS — M25.552 ACUTE HIP PAIN, LEFT: ICD-10-CM

## 2024-09-25 DIAGNOSIS — M84.352A STRESS FRACTURE OF LEFT HIP: Primary | ICD-10-CM

## 2024-09-25 DIAGNOSIS — M16.12 PRIMARY OSTEOARTHRITIS OF LEFT HIP: ICD-10-CM

## 2024-09-25 RX ORDER — HYDROCODONE BITARTRATE AND ACETAMINOPHEN 10; 325 MG/1; MG/1
1 TABLET ORAL EVERY 8 HOURS PRN
Qty: 30 TABLET | Refills: 0 | Status: SHIPPED | OUTPATIENT
Start: 2024-09-25

## 2024-09-25 RX ORDER — ONDANSETRON 4 MG/1
4 TABLET, ORALLY DISINTEGRATING ORAL EVERY 8 HOURS PRN
Qty: 9 TABLET | Refills: 0 | Status: SHIPPED | OUTPATIENT
Start: 2024-09-25

## 2024-09-25 RX ORDER — ONDANSETRON 4 MG/1
4 TABLET, ORALLY DISINTEGRATING ORAL EVERY 8 HOURS PRN
Qty: 20 TABLET | Refills: 0 | Status: SHIPPED | OUTPATIENT
Start: 2024-09-25 | End: 2024-09-25 | Stop reason: SDUPTHER

## 2024-09-25 NOTE — TELEPHONE ENCOUNTER
Attempted to contact pt 2x- call answered then hung up first attempt.  LVM notifying pt that [facility] was able to have medication for her for tomorrow but encouraged her to go to ER if pain is too bad to wait for refill in the morning so she can make sure her pain is managed adequately.

## 2024-09-25 NOTE — TELEPHONE ENCOUNTER
Pt returned call. Reiterated message to pt. She will consider going to ER if pain is unmanageable over night

## 2024-10-02 ENCOUNTER — PATIENT MESSAGE (OUTPATIENT)
Dept: INTERNAL MEDICINE | Facility: CLINIC | Age: 64
End: 2024-10-02
Payer: COMMERCIAL

## 2024-11-04 RX ORDER — BACLOFEN 10 MG/1
10 TABLET ORAL NIGHTLY PRN
Qty: 90 TABLET | Refills: 5 | Status: SHIPPED | OUTPATIENT
Start: 2024-11-04

## 2024-11-04 RX ORDER — FOLIC ACID 1 MG/1
1 TABLET ORAL DAILY
Qty: 90 TABLET | Refills: 3 | Status: SHIPPED | OUTPATIENT
Start: 2024-11-04

## 2024-11-04 RX ORDER — GABAPENTIN 100 MG/1
100 CAPSULE ORAL 3 TIMES DAILY
Qty: 270 CAPSULE | Refills: 11 | Status: SHIPPED | OUTPATIENT
Start: 2024-11-04

## 2024-11-04 RX ORDER — LISINOPRIL AND HYDROCHLOROTHIAZIDE 12.5; 2 MG/1; MG/1
2 TABLET ORAL DAILY
Qty: 180 TABLET | Refills: 11 | Status: SHIPPED | OUTPATIENT
Start: 2024-11-04

## 2024-11-04 NOTE — TELEPHONE ENCOUNTER
Care Due:                  Date            Visit Type   Department     Provider  --------------------------------------------------------------------------------                                [facility]  Last Visit: 01-      FOLLOW UP    MEDICINE       Mike Mcdermott  Next Visit: None Scheduled  None         None Found                                                            Last  Test          Frequency    Reason                     Performed    Due Date  --------------------------------------------------------------------------------    Office Visit  12 months..  folic....................  01- 01-    CMP.........  12 months..  furosemide,                12- 12-                             lisinopriL-hydrochlorothi                             azide....................    [employer] Embedded Care Due Messages. Reference number: [id number].   11/04/2024 12:34:00 PM CST   MOLECULAR PCR

## 2024-11-04 NOTE — TELEPHONE ENCOUNTER
No care due was identified.  [facility] Embedded Care Due Messages. Reference number: [id number].   11/04/2024 12:34:37 PM CST

## 2024-11-29 ENCOUNTER — TELEPHONE (OUTPATIENT)
Dept: PULMONOLOGY | Facility: CLINIC | Age: 64
End: 2024-11-29
Payer: COMMERCIAL

## 2024-11-29 NOTE — TELEPHONE ENCOUNTER
Dr Ford asked me to schedule a Ct scan and a visit with Dr Ford for Mrs Clayton. The CT will be done at [facility] location on 12-17-24. Mrs Clayton will see Dr Ford 1-14-25 at 1030am [facility]. I will call Mrs Clayton and mail the appointment to her home in [place]. Ivette Marin LPN

## 2024-12-03 ENCOUNTER — PATIENT MESSAGE (OUTPATIENT)
Dept: INTERNAL MEDICINE | Facility: CLINIC | Age: 64
End: 2024-12-03
Payer: COMMERCIAL

## 2024-12-22 ENCOUNTER — PATIENT MESSAGE (OUTPATIENT)
Dept: INTERNAL MEDICINE | Facility: CLINIC | Age: 64
End: 2024-12-22
Payer: COMMERCIAL

## 2024-12-27 ENCOUNTER — HOSPITAL ENCOUNTER (INPATIENT)
Facility: HOSPITAL | Age: 64
LOS: 1 days | Discharge: HOME | DRG: 193 | End: 2024-12-30
Attending: EMERGENCY MEDICINE | Admitting: STUDENT IN AN ORGANIZED HEALTH CARE EDUCATION/TRAINING PROGRAM
Payer: COMMERCIAL

## 2024-12-27 DIAGNOSIS — R79.89 ELEVATED BRAIN NATRIURETIC PEPTIDE (BNP) LEVEL: ICD-10-CM

## 2024-12-27 DIAGNOSIS — J10.1 INFLUENZA A: ICD-10-CM

## 2024-12-27 DIAGNOSIS — J44.1 COPD EXACERBATION: ICD-10-CM

## 2024-12-27 DIAGNOSIS — R06.02 SHORTNESS OF BREATH: ICD-10-CM

## 2024-12-27 DIAGNOSIS — J43.2 CENTRILOBULAR EMPHYSEMA: ICD-10-CM

## 2024-12-27 DIAGNOSIS — J44.1 COPD WITH ACUTE EXACERBATION: Primary | ICD-10-CM

## 2024-12-27 PROBLEM — E87.70 VOLUME OVERLOAD: Status: ACTIVE | Noted: 2024-12-27

## 2024-12-27 LAB
ALBUMIN SERPL BCP-MCNC: 4 G/DL (ref 3.5–5.2)
ALP SERPL-CCNC: 86 U/L (ref 38–126)
ALT SERPL W/O P-5'-P-CCNC: 14 U/L (ref 10–44)
ANION GAP SERPL CALC-SCNC: 6 MMOL/L (ref 8–16)
AST SERPL-CCNC: 18 U/L (ref 15–46)
BASOPHILS # BLD AUTO: 0.02 K/UL (ref 0–0.2)
BASOPHILS NFR BLD: 0.7 % (ref 0–1.9)
BILIRUB SERPL-MCNC: 0.3 MG/DL (ref 0.1–1)
CALCIUM SERPL-MCNC: 8.9 MG/DL (ref 8.7–10.5)
CHLORIDE SERPL-SCNC: 101 MMOL/L (ref 95–110)
CO2 SERPL-SCNC: 28 MMOL/L (ref 23–29)
CREAT SERPL-MCNC: 0.55 MG/DL (ref 0.5–1.4)
DIFFERENTIAL METHOD BLD: ABNORMAL
EOSINOPHIL # BLD AUTO: 0 K/UL (ref 0–0.5)
EOSINOPHIL NFR BLD: 0.4 % (ref 0–8)
ERYTHROCYTE [DISTWIDTH] IN BLOOD BY AUTOMATED COUNT: 14.5 % (ref 11.5–14.5)
EST. GFR  (NO RACE VARIABLE): >60 ML/MIN/1.73 M^2
GLUCOSE SERPL-MCNC: 129 MG/DL (ref 70–110)
HCT VFR BLD AUTO: 30.8 % (ref 37–48.5)
HGB BLD-MCNC: 9.3 G/DL (ref 12–16)
IMM GRANULOCYTES # BLD AUTO: 0.03 K/UL (ref 0–0.04)
IMM GRANULOCYTES NFR BLD AUTO: 1.1 % (ref 0–0.5)
INFLUENZA A, MOLECULAR: POSITIVE
INFLUENZA B, MOLECULAR: NEGATIVE
LYMPHOCYTES # BLD AUTO: 0.2 K/UL (ref 1–4.8)
LYMPHOCYTES NFR BLD: 5.5 % (ref 18–48)
MAGNESIUM SERPL-MCNC: 2 MG/DL (ref 1.6–2.6)
MCH RBC QN AUTO: 26.1 PG (ref 27–31)
MCHC RBC AUTO-ENTMCNC: 30.2 G/DL (ref 32–36)
MCV RBC AUTO: 87 FL (ref 82–98)
MONOCYTES # BLD AUTO: 0.3 K/UL (ref 0.3–1)
MONOCYTES NFR BLD: 9.9 % (ref 4–15)
NEUTROPHILS # BLD AUTO: 2.3 K/UL (ref 1.8–7.7)
NEUTROPHILS NFR BLD: 82.4 % (ref 38–73)
NRBC BLD-RTO: 0 /100 WBC
NT-PROBNP SERPL-MCNC: 1950 PG/ML (ref 5–900)
OHS QRS DURATION: 92 MS
OHS QTC CALCULATION: 435 MS
PLATELET # BLD AUTO: 166 K/UL (ref 150–450)
PMV BLD AUTO: 10.1 FL (ref 9.2–12.9)
POTASSIUM SERPL-SCNC: 3.3 MMOL/L (ref 3.5–5.1)
PROT SERPL-MCNC: 6.8 G/DL (ref 6–8.4)
RBC # BLD AUTO: 3.56 M/UL (ref 4–5.4)
SARS-COV-2 RDRP RESP QL NAA+PROBE: NEGATIVE
SODIUM SERPL-SCNC: 135 MMOL/L (ref 136–145)
SPECIMEN SOURCE: ABNORMAL
TROPONIN I SERPL-MCNC: <0.012 NG/ML (ref 0.01–0.03)
UUN UR-MCNC: 12 MG/DL (ref 7–17)
WBC # BLD AUTO: 2.74 K/UL (ref 3.9–12.7)

## 2024-12-27 PROCEDURE — 87502 INFLUENZA DNA AMP PROBE: CPT | Mod: ER | Performed by: EMERGENCY MEDICINE

## 2024-12-27 PROCEDURE — 63600175 PHARM REV CODE 636 W HCPCS

## 2024-12-27 PROCEDURE — 25000242 PHARM REV CODE 250 ALT 637 W/ HCPCS: Mod: ER | Performed by: EMERGENCY MEDICINE

## 2024-12-27 PROCEDURE — G0378 HOSPITAL OBSERVATION PER HR: HCPCS

## 2024-12-27 PROCEDURE — 25000003 PHARM REV CODE 250: Mod: ER | Performed by: EMERGENCY MEDICINE

## 2024-12-27 PROCEDURE — 87502 INFLUENZA DNA AMP PROBE: CPT | Mod: ER

## 2024-12-27 PROCEDURE — 94640 AIRWAY INHALATION TREATMENT: CPT | Mod: XB

## 2024-12-27 PROCEDURE — 80053 COMPREHEN METABOLIC PANEL: CPT | Mod: ER | Performed by: EMERGENCY MEDICINE

## 2024-12-27 PROCEDURE — 85025 COMPLETE CBC W/AUTO DIFF WBC: CPT | Mod: ER | Performed by: EMERGENCY MEDICINE

## 2024-12-27 PROCEDURE — 99900035 HC TECH TIME PER 15 MIN (STAT): Mod: ER

## 2024-12-27 PROCEDURE — 25000003 PHARM REV CODE 250: Performed by: FAMILY MEDICINE

## 2024-12-27 PROCEDURE — 87635 SARS-COV-2 COVID-19 AMP PRB: CPT | Mod: ER | Performed by: EMERGENCY MEDICINE

## 2024-12-27 PROCEDURE — G0378 HOSPITAL OBSERVATION PER HR: HCPCS | Mod: ER

## 2024-12-27 PROCEDURE — 94761 N-INVAS EAR/PLS OXIMETRY MLT: CPT

## 2024-12-27 PROCEDURE — 93010 ELECTROCARDIOGRAM REPORT: CPT | Mod: ,,, | Performed by: INTERNAL MEDICINE

## 2024-12-27 PROCEDURE — S4991 NICOTINE PATCH NONLEGEND: HCPCS

## 2024-12-27 PROCEDURE — 83735 ASSAY OF MAGNESIUM: CPT | Mod: ER | Performed by: EMERGENCY MEDICINE

## 2024-12-27 PROCEDURE — 96372 THER/PROPH/DIAG INJ SC/IM: CPT

## 2024-12-27 PROCEDURE — 25000003 PHARM REV CODE 250: Performed by: REGISTERED NURSE

## 2024-12-27 PROCEDURE — 63600175 PHARM REV CODE 636 W HCPCS: Mod: ER | Performed by: EMERGENCY MEDICINE

## 2024-12-27 PROCEDURE — 99285 EMERGENCY DEPT VISIT HI MDM: CPT | Mod: 25,ER

## 2024-12-27 PROCEDURE — 96374 THER/PROPH/DIAG INJ IV PUSH: CPT | Mod: 59

## 2024-12-27 PROCEDURE — 25000242 PHARM REV CODE 250 ALT 637 W/ HCPCS

## 2024-12-27 PROCEDURE — 27000221 HC OXYGEN, UP TO 24 HOURS: Mod: ER

## 2024-12-27 PROCEDURE — 93005 ELECTROCARDIOGRAM TRACING: CPT | Mod: ER

## 2024-12-27 PROCEDURE — 84484 ASSAY OF TROPONIN QUANT: CPT | Mod: ER | Performed by: EMERGENCY MEDICINE

## 2024-12-27 PROCEDURE — 96374 THER/PROPH/DIAG INJ IV PUSH: CPT | Mod: ER

## 2024-12-27 PROCEDURE — 25000003 PHARM REV CODE 250

## 2024-12-27 PROCEDURE — 83880 ASSAY OF NATRIURETIC PEPTIDE: CPT | Mod: ER | Performed by: EMERGENCY MEDICINE

## 2024-12-27 PROCEDURE — 94644 CONT INHLJ TX 1ST HOUR: CPT | Mod: ER

## 2024-12-27 PROCEDURE — 99900035 HC TECH TIME PER 15 MIN (STAT)

## 2024-12-27 RX ORDER — BACLOFEN 5 MG/1
10 TABLET ORAL NIGHTLY PRN
Status: DISCONTINUED | OUTPATIENT
Start: 2024-12-27 | End: 2024-12-30 | Stop reason: HOSPADM

## 2024-12-27 RX ORDER — LISINOPRIL 20 MG/1
40 TABLET ORAL DAILY
Status: DISCONTINUED | OUTPATIENT
Start: 2024-12-28 | End: 2024-12-28

## 2024-12-27 RX ORDER — TALC
6 POWDER (GRAM) TOPICAL NIGHTLY PRN
Status: DISCONTINUED | OUTPATIENT
Start: 2024-12-27 | End: 2024-12-30 | Stop reason: HOSPADM

## 2024-12-27 RX ORDER — SODIUM CHLORIDE 0.9 % (FLUSH) 0.9 %
3 SYRINGE (ML) INJECTION
Status: DISCONTINUED | OUTPATIENT
Start: 2024-12-27 | End: 2024-12-30 | Stop reason: HOSPADM

## 2024-12-27 RX ORDER — OSELTAMIVIR PHOSPHATE 75 MG/1
75 CAPSULE ORAL 2 TIMES DAILY
Status: DISCONTINUED | OUTPATIENT
Start: 2024-12-27 | End: 2024-12-30 | Stop reason: HOSPADM

## 2024-12-27 RX ORDER — TRAMADOL HYDROCHLORIDE 50 MG/1
50 TABLET ORAL
COMMUNITY
Start: 2024-12-23

## 2024-12-27 RX ORDER — FUROSEMIDE 10 MG/ML
40 INJECTION INTRAMUSCULAR; INTRAVENOUS DAILY
Status: DISCONTINUED | OUTPATIENT
Start: 2024-12-27 | End: 2024-12-29

## 2024-12-27 RX ORDER — IBUPROFEN 200 MG
1 TABLET ORAL DAILY
Status: DISCONTINUED | OUTPATIENT
Start: 2024-12-27 | End: 2024-12-30 | Stop reason: HOSPADM

## 2024-12-27 RX ORDER — DEXAMETHASONE SODIUM PHOSPHATE 4 MG/ML
8 INJECTION, SOLUTION INTRA-ARTICULAR; INTRALESIONAL; INTRAMUSCULAR; INTRAVENOUS; SOFT TISSUE
Status: COMPLETED | OUTPATIENT
Start: 2024-12-27 | End: 2024-12-27

## 2024-12-27 RX ORDER — ACETAMINOPHEN 325 MG/1
650 TABLET ORAL EVERY 6 HOURS PRN
Status: DISCONTINUED | OUTPATIENT
Start: 2024-12-27 | End: 2024-12-30 | Stop reason: HOSPADM

## 2024-12-27 RX ORDER — ONDANSETRON HYDROCHLORIDE 2 MG/ML
4 INJECTION, SOLUTION INTRAVENOUS EVERY 12 HOURS PRN
Status: DISCONTINUED | OUTPATIENT
Start: 2024-12-27 | End: 2024-12-30 | Stop reason: HOSPADM

## 2024-12-27 RX ORDER — SUCRALFATE 1 G/1
1 TABLET ORAL 2 TIMES DAILY
Status: DISCONTINUED | OUTPATIENT
Start: 2024-12-27 | End: 2024-12-30 | Stop reason: HOSPADM

## 2024-12-27 RX ORDER — SERTRALINE HYDROCHLORIDE 100 MG/1
100 TABLET, FILM COATED ORAL 2 TIMES DAILY
Status: DISCONTINUED | OUTPATIENT
Start: 2024-12-28 | End: 2024-12-30 | Stop reason: HOSPADM

## 2024-12-27 RX ORDER — PREDNISONE 20 MG/1
40 TABLET ORAL DAILY
Status: DISCONTINUED | OUTPATIENT
Start: 2024-12-27 | End: 2024-12-30 | Stop reason: HOSPADM

## 2024-12-27 RX ORDER — LISINOPRIL AND HYDROCHLOROTHIAZIDE 12.5; 2 MG/1; MG/1
2 TABLET ORAL DAILY
Status: DISCONTINUED | OUTPATIENT
Start: 2024-12-28 | End: 2024-12-27

## 2024-12-27 RX ORDER — IBUPROFEN 400 MG/1
800 TABLET ORAL ONCE
Status: COMPLETED | OUTPATIENT
Start: 2024-12-27 | End: 2024-12-27

## 2024-12-27 RX ORDER — IPRATROPIUM BROMIDE AND ALBUTEROL SULFATE 2.5; .5 MG/3ML; MG/3ML
3 SOLUTION RESPIRATORY (INHALATION) EVERY 4 HOURS
Status: DISCONTINUED | OUTPATIENT
Start: 2024-12-27 | End: 2024-12-30 | Stop reason: HOSPADM

## 2024-12-27 RX ORDER — ACETAMINOPHEN 325 MG/1
650 TABLET ORAL
Status: COMPLETED | OUTPATIENT
Start: 2024-12-27 | End: 2024-12-27

## 2024-12-27 RX ORDER — APIXABAN 2.5 MG/1
2.5 TABLET, FILM COATED ORAL 2 TIMES DAILY
COMMUNITY
Start: 2024-11-05

## 2024-12-27 RX ORDER — IPRATROPIUM BROMIDE 0.5 MG/2.5ML
0.5 SOLUTION RESPIRATORY (INHALATION)
Status: COMPLETED | OUTPATIENT
Start: 2024-12-27 | End: 2024-12-27

## 2024-12-27 RX ORDER — PANTOPRAZOLE SODIUM 40 MG/1
40 TABLET, DELAYED RELEASE ORAL DAILY
Status: DISCONTINUED | OUTPATIENT
Start: 2024-12-28 | End: 2024-12-30 | Stop reason: HOSPADM

## 2024-12-27 RX ORDER — GABAPENTIN 100 MG/1
100 CAPSULE ORAL 3 TIMES DAILY
Status: DISCONTINUED | OUTPATIENT
Start: 2024-12-27 | End: 2024-12-30 | Stop reason: HOSPADM

## 2024-12-27 RX ORDER — ALBUTEROL SULFATE 2.5 MG/.5ML
10 SOLUTION RESPIRATORY (INHALATION)
Status: COMPLETED | OUTPATIENT
Start: 2024-12-27 | End: 2024-12-27

## 2024-12-27 RX ORDER — MORPHINE SULFATE 15 MG/1
15 TABLET ORAL EVERY 12 HOURS PRN
COMMUNITY
Start: 2024-11-01

## 2024-12-27 RX ORDER — ROPINIROLE 0.25 MG/1
0.5 TABLET, FILM COATED ORAL NIGHTLY
Status: DISCONTINUED | OUTPATIENT
Start: 2024-12-27 | End: 2024-12-30 | Stop reason: HOSPADM

## 2024-12-27 RX ORDER — ENOXAPARIN SODIUM 100 MG/ML
40 INJECTION SUBCUTANEOUS EVERY 24 HOURS
Status: DISCONTINUED | OUTPATIENT
Start: 2024-12-27 | End: 2024-12-30 | Stop reason: HOSPADM

## 2024-12-27 RX ORDER — POLYETHYLENE GLYCOL 3350 17 G/17G
17 POWDER, FOR SOLUTION ORAL DAILY
Status: DISCONTINUED | OUTPATIENT
Start: 2024-12-28 | End: 2024-12-30 | Stop reason: HOSPADM

## 2024-12-27 RX ORDER — SERTRALINE HYDROCHLORIDE 100 MG/1
200 TABLET, FILM COATED ORAL DAILY
Status: DISCONTINUED | OUTPATIENT
Start: 2024-12-28 | End: 2024-12-27

## 2024-12-27 RX ORDER — MESALAMINE 1.2 G/1
2.4 TABLET, DELAYED RELEASE ORAL
Status: DISCONTINUED | OUTPATIENT
Start: 2024-12-28 | End: 2024-12-30 | Stop reason: HOSPADM

## 2024-12-27 RX ORDER — HYDROCHLOROTHIAZIDE 25 MG/1
25 TABLET ORAL DAILY
Status: DISCONTINUED | OUTPATIENT
Start: 2024-12-28 | End: 2024-12-28

## 2024-12-27 RX ORDER — ONDANSETRON 8 MG/1
8 TABLET, ORALLY DISINTEGRATING ORAL EVERY 8 HOURS PRN
Status: DISCONTINUED | OUTPATIENT
Start: 2024-12-27 | End: 2024-12-30 | Stop reason: HOSPADM

## 2024-12-27 RX ORDER — OXYCODONE AND ACETAMINOPHEN 10; 325 MG/1; MG/1
TABLET ORAL
COMMUNITY
Start: 2024-12-03

## 2024-12-27 RX ADMIN — PREDNISONE 40 MG: 20 TABLET ORAL at 04:12

## 2024-12-27 RX ADMIN — IPRATROPIUM BROMIDE AND ALBUTEROL SULFATE 3 ML: 2.5; .5 SOLUTION RESPIRATORY (INHALATION) at 11:12

## 2024-12-27 RX ADMIN — IBUPROFEN 800 MG: 400 TABLET ORAL at 09:12

## 2024-12-27 RX ADMIN — ALBUTEROL SULFATE 10 MG: 2.5 SOLUTION RESPIRATORY (INHALATION) at 10:12

## 2024-12-27 RX ADMIN — GABAPENTIN 100 MG: 100 CAPSULE ORAL at 09:12

## 2024-12-27 RX ADMIN — IPRATROPIUM BROMIDE 0.5 MG: 0.5 SOLUTION RESPIRATORY (INHALATION) at 10:12

## 2024-12-27 RX ADMIN — OSELTAMIVIR PHOSPHATE 75 MG: 75 CAPSULE ORAL at 09:12

## 2024-12-27 RX ADMIN — ENOXAPARIN SODIUM 40 MG: 40 INJECTION SUBCUTANEOUS at 04:12

## 2024-12-27 RX ADMIN — IPRATROPIUM BROMIDE AND ALBUTEROL SULFATE 3 ML: 2.5; .5 SOLUTION RESPIRATORY (INHALATION) at 07:12

## 2024-12-27 RX ADMIN — ACETAMINOPHEN 650 MG: 325 TABLET ORAL at 11:12

## 2024-12-27 RX ADMIN — SUCRALFATE 1 G: 1 TABLET ORAL at 09:12

## 2024-12-27 RX ADMIN — ACETAMINOPHEN 650 MG: 325 TABLET ORAL at 07:12

## 2024-12-27 RX ADMIN — NICOTINE 1 PATCH: 21 PATCH, EXTENDED RELEASE TRANSDERMAL at 04:12

## 2024-12-27 RX ADMIN — ROPINIROLE HYDROCHLORIDE 0.5 MG: 0.25 TABLET, FILM COATED ORAL at 09:12

## 2024-12-27 RX ADMIN — IPRATROPIUM BROMIDE AND ALBUTEROL SULFATE 3 ML: 2.5; .5 SOLUTION RESPIRATORY (INHALATION) at 03:12

## 2024-12-27 RX ADMIN — FUROSEMIDE 40 MG: 10 INJECTION, SOLUTION INTRAMUSCULAR; INTRAVENOUS at 09:12

## 2024-12-27 RX ADMIN — POTASSIUM BICARBONATE 50 MEQ: 978 TABLET, EFFERVESCENT ORAL at 10:12

## 2024-12-27 RX ADMIN — DEXAMETHASONE SODIUM PHOSPHATE 8 MG: 4 INJECTION, SOLUTION INTRA-ARTICULAR; INTRALESIONAL; INTRAMUSCULAR; INTRAVENOUS; SOFT TISSUE at 09:12

## 2024-12-27 NOTE — PLAN OF CARE
12/27/24 1508   Admission   Initial VN Admission Questions Complete   Communication Issues? None   Shift   Virtual Nurse - Patient Verbalized Approval Of Camera Use   Safety/Activity   Patient Rounds visualized patient;placement of personal items at bedside;ID band on;bed in low position;bed wheels locked;call light in patient/parent reach;clutter free environment maintained

## 2024-12-27 NOTE — ED NOTES
Patient presents to ED reports SOB since 12/25 night. Patient reports hx of COPD, has had increase in oxygen use at home. Patient states uses oxygen PRN, last night was up to 4L NC. Patient states pulse ox has been between 85-94 at home. Patient states has not smoked in 3 days.

## 2024-12-27 NOTE — ASSESSMENT & PLAN NOTE
Patient's COPD is with exacerbation noted by continued dyspnea currently.  Patient is currently on COPD Pathway. Continue scheduled inhalers Steroids and Supplemental oxygen and monitor respiratory status closely.     -nebulizer treatment q4 and PRN  -O2 at 2L supplemental  -Prednisone 40mg

## 2024-12-27 NOTE — ASSESSMENT & PLAN NOTE
-BNP: 1950, troponin negative, no CP, EKG with Afib with junctional rhythm.   -Echo pending, will consider cardiology consult if echo returns positive  -Lasix 40mg IV

## 2024-12-27 NOTE — HPI
Umu Clayton a 64-year old female with PMHx of restless leg, L-hip OA, HTN, stomach ulcer, GERD, emphysema, and anxiety transferred from [facility] ER to [place] for increase SOB. Patient states she's has been SOB with exertion, coughing, not feeling well, decrease appetite, and having a HA since Tuesday. Patient gives an account of having a fever of 100.5F at home, coughing, chills, feeling tired, runny nose, and HA, but denies N/V, CP, and palpitations. She's been smoking 2-packs of cigarettes since age 17. She took all her home medication yesterday including using her albuterol inhaler with no relief.      ER Course: WBC 2.74, H/H 9.3/30.8, K 3.3, NT-proBNP 1950, trop negative, Flu A positive, Flu B negative, COVID negative. Chest xray: chronic lung disease.  No acute pulmonary finding no pneumothorax or pleural effusion.  No acute osseous finding. , EKG: A-fib with junctional pacemaker. Medication: tylenol 650mg albuterol, dexamethason 8mg, ipratropium, potassium BiCarb 50mEq. Vitals: 99.6F, 115/63 (80), 84, 18/, 94% on 2L O2.

## 2024-12-27 NOTE — Clinical Note
Diagnosis: COPD with acute exacerbation [727684]   Future Attending Provider: ELIZABETH MERIDA [[id number]]   Is the patient being sent to ED Observation?: No   Special Needs:: Isolation [20]

## 2024-12-27 NOTE — ED PROVIDER NOTES
Encounter Date: 2024       History     Chief Complaint   Patient presents with    Shortness of Breath     C/o SoB since yesterday. Hx of copd.       64-year-old female presents to the emergency department complaining of cough, congestion, shortness of breath.  Onset a few days ago but worsening over the last 24 hours.  Diminishing returns with her home neb treatments.  Notes cough that is mildly productive of yellow/green sputum.  Denies any fever.  No other symptoms reported at this time.      Of note, patient does have home oxygen but is p.r.n. only      Review of patient's allergies indicates:   Allergen Reactions    Penicillins Hives     Past Medical History:   Diagnosis Date    Anxiety     Emphysema lung     GERD (gastroesophageal reflux disease)     History of stomach ulcers     Hypertension     Osteoarthritis of hip     Restless leg syndrome     Ulcerative colitis      Past Surgical History:   Procedure Laterality Date    gastric by pass      HERNIA REPAIR      HYSTERECTOMY      INJECTION OF JOINT Left 2024    Procedure: Left IA Hip Joint injection;  Surgeon: Mario Alberto Eagle MD;  Location: [facility];  Service: Pain Management;  Laterality: Left;    OOPHORECTOMY Right      Family History   Problem Relation Name Age of Onset    COPD Father Ced Ely     Lung cancer Father Ced Ely     Colon cancer Maternal Grandmother Sara vizcarra         Colon cancer    COPD Mother Ethel Ely             Multiple sclerosis Son      Heart disease Neg Hx      Stroke Neg Hx       Social History     Tobacco Use    Smoking status: Every Day     Current packs/day: 2.50     Average packs/day: 2.5 packs/day for 50.0 years (125.0 ttl pk-yrs)     Types: Cigarettes     Start date:     Smokeless tobacco: Never    Tobacco comments:     Enrolled in the [employer] on 21 ([facility] Member ID # [id number]). Ambulatory referral to Smoking Cessation program.    Substance Use Topics    Alcohol use:  Never    Drug use: No     Review of Systems   Constitutional:  Positive for fatigue. Negative for chills.   HENT:  Positive for congestion.    Respiratory:  Positive for cough and shortness of breath.    Cardiovascular:  Negative for chest pain.   Gastrointestinal:  Negative for abdominal pain.   Musculoskeletal:  Negative for back pain.   Neurological:  Negative for headaches.       Physical Exam     Initial Vitals [12/27/24 0902]   BP Pulse Resp Temp SpO2   (!) 143/76 88 (!) 28 99.5 °F (37.5 °C) (!) 85 %      MAP       --         Physical Exam    Nursing note and vitals reviewed.  Constitutional: She appears well-developed and well-nourished. No distress.   HENT:   Head: Normocephalic and atraumatic.   Eyes: Conjunctivae and EOM are normal. Pupils are equal, round, and reactive to light.   Neck: Neck supple. No tracheal deviation present.   Normal range of motion.  Cardiovascular:  Normal rate and intact distal pulses.           Pulmonary/Chest: She is in respiratory distress (Mild increased work of breathing).   Musculoskeletal:         General: No tenderness. Normal range of motion.      Cervical back: Normal range of motion and neck supple.     Neurological: She is alert and oriented to person, place, and time. She has normal strength. No cranial nerve deficit. GCS score is 15. GCS eye subscore is 4. GCS verbal subscore is 5. GCS motor subscore is 6.   Skin: Skin is warm and dry.         ED Course   Critical Care    Date/Time: 12/27/2024 11:12 AM    Performed by: Robson Gunn MD  Authorized by: Robson Gunn MD  Direct patient critical care time: 15 minutes  Additional history critical care time: 15 minutes  Ordering / reviewing critical care time: 15 minutes  Documentation critical care time: 15 minutes  Consulting other physicians critical care time: 15 minutes  Consult with family critical care time: 10 minutes  Total critical care time (exclusive of procedural time) : 85 minutes  Critical  care time was exclusive of separately billable procedures and treating other patients.  Critical care was necessary to treat or prevent imminent or life-threatening deterioration of the following conditions: respiratory failure.  Critical care was time spent personally by me on the following activities: development of treatment plan with patient or surrogate, interpretation of cardiac output measurements, obtaining history from patient or surrogate, evaluation of patient's response to treatment, examination of patient, ordering and performing treatments and interventions, re-evaluation of patient's condition, pulse oximetry, ordering and review of radiographic studies, ordering and review of laboratory studies and review of old charts.        Labs Reviewed   INFLUENZA A & B BY MOLECULAR - Abnormal       Result Value    Influenza A, Molecular Positive (*)     Influenza B, Molecular Negative      Flu A & B Source Nasal swab     CBC W/ AUTO DIFFERENTIAL - Abnormal    WBC 2.74 (*)     RBC 3.56 (*)     Hemoglobin 9.3 (*)     Hematocrit 30.8 (*)     MCV 87      MCH 26.1 (*)     MCHC 30.2 (*)     RDW 14.5      Platelets 166      MPV 10.1      Immature Granulocytes 1.1 (*)     Gran # (ANC) 2.3      Immature Grans (Abs) 0.03      Lymph # 0.2 (*)     Mono # 0.3      Eos # 0.0      Baso # 0.02      nRBC 0      Gran % 82.4 (*)     Lymph % 5.5 (*)     Mono % 9.9      Eosinophil % 0.4      Basophil % 0.7      Differential Method Automated     COMPREHENSIVE METABOLIC PANEL - Abnormal    Sodium 135 (*)     Potassium 3.3 (*)     Chloride 101      CO2 28      Glucose 129 (*)     BUN 12      Creatinine 0.55      Calcium 8.9      Total Protein 6.8      Albumin 4.0      Total Bilirubin 0.3      Alkaline Phosphatase 86      AST 18      ALT 14      Anion Gap 6 (*)     eGFR >60.0     NT-PRO NATRIURETIC PEPTIDE - Abnormal    NT-proBNP 1950 (*)    SARS-COV-2 RNA AMPLIFICATION, QUAL    SARS-CoV-2 RNA, Amplification, Qual Negative     TROPONIN  I    Troponin I <0.012     MAGNESIUM    Magnesium 2.0       EKG Readings: (Independently Interpreted)   Initial Reading: No STEMI. Previous EKG: Compared with most recent EKG Previous EKG Date: 12/26/2023 (Minimal change). Rhythm: Normal Sinus Rhythm. Heart Rate: 86. Ectopy: PACs. ST Segments: Normal ST Segments. Axis: Normal.   EKG independently interpreted by me pending Cardiology review     ECG Results              EKG 12-lead (In process)        Collection Time Result Time QRS Duration [facility] QTC Calculation    12/27/24 09:16:10 12/27/24 10:15:38 92 435                     In process by Interface, Lab In [facility] (12/27/24 10:15:41)                   Narrative:    Test Reason : R06.02,    Vent. Rate :  86 BPM     Atrial Rate :  82 BPM     P-R Int :    ms          QRS Dur :  92 ms      QT Int : 364 ms       P-R-T Axes :     65  67 degrees    QTcB Int : 435 ms    Atrial fibrillation with a competing junctional pacemaker  Abnormal ECG  When compared with ECG of 26-Dec-2023 20:47,  Atrial fibrillation has replaced Sinus rhythm    Referred By: AAAREFERRAL SELF           Confirmed By:                                       X-Rays:   Independently Interpreted Readings:   Other Readings:  Chest x-ray independently interpreted by me pending radiology review: No acute infiltrate, no pneumothorax, no effusion    Imaging Results              X-Ray Chest AP Portable (Final result)  Result time 12/27/24 09:50:59      Final result by Allen Prescott MD (12/27/24 09:50:59)                   Impression:      No acute finding in the chest.      Electronically signed by: Allen Prescott  Date:    12/27/2024  Time:    09:50               Narrative:    EXAMINATION:  XR CHEST AP PORTABLE    CLINICAL HISTORY:  Shortness of breath;    FINDINGS:  Comparison: CT chest December 30, 2024.    Mediastinal silhouette is within normal limits.  Chronic lung disease.  No acute pulmonary finding no pneumothorax or pleural effusion.  No acute osseous  finding.                                      Medications   dexAMETHasone injection 8 mg (8 mg Intravenous Given 12/27/24 0931)   potassium bicarbonate disintegrating tablet 50 mEq (50 mEq Oral Given 12/27/24 1031)   ipratropium 0.02 % nebulizer solution 0.5 mg (0.5 mg Nebulization Given 12/27/24 1020)   albuterol sulfate nebulizer solution 10 mg (10 mg Nebulization Given 12/27/24 1020)   acetaminophen tablet 650 mg (650 mg Oral Given 12/27/24 1106)     Medical Decision Making  64-year-old female presents emergency department complaining of cough, congestion, shortness of breath      Differential: ACS, dissection, pneumonia, pneumothorax, CHF, COPD, COVID, influenza, viral syndrome        Patient given Decadron, Tylenol, neb treatments.  Still requiring more supplemental O2 than usual.  Informed of results and plan to admit for further evaluation and management.  Patient comfortable with plan.  Discussed with [facility] hospitalist who will see patient for admission.    Problems Addressed:  COPD with acute exacerbation: acute illness or injury with systemic symptoms that poses a threat to life or bodily functions  Influenza A: acute illness or injury with systemic symptoms    Amount and/or Complexity of Data Reviewed  External Data Reviewed: ECG and notes.     Details: Reviewed most recent EKG for comparison     Reviewed most recent sports Medicine note documenting baseline medications and past medical history  Labs: ordered.     Details: CBC without leukocytosis, mild anemia; CMP with normal renal and liver function tests, mild hypokalemia; troponin negative; BNP mildly elevated; influenza positive  Radiology: ordered and independent interpretation performed. Decision-making details documented in ED Course.  ECG/medicine tests: ordered and independent interpretation performed. Decision-making details documented in ED Course.  Discussion of management or test interpretation with external provider(s): Discussed with  [facility] hospitalist regarding patient's past medical history, presentation, labs, imaging, interventions, plan to admit    Risk  OTC drugs.  Prescription drug management.  Decision regarding hospitalization.    Critical Care  Total time providing critical care: 85 minutes                                      Clinical Impression:  Final diagnoses:  [R06.02] Shortness of breath  [J44.1] COPD with acute exacerbation (Primary)  [J10.1] Influenza A          ED Disposition Condition    Observation                 Robson Gunn MD  12/27/24 1685

## 2024-12-27 NOTE — SUBJECTIVE & OBJECTIVE
Past Medical History:   Diagnosis Date    Anxiety     Emphysema lung     GERD (gastroesophageal reflux disease)     History of stomach ulcers     Hypertension     Osteoarthritis of hip     Restless leg syndrome     Ulcerative colitis        Past Surgical History:   Procedure Laterality Date    gastric by pass      HERNIA REPAIR  1998    HYSTERECTOMY      INJECTION OF JOINT Left 8/28/2024    Procedure: Left IA Hip Joint injection;  Surgeon: Mario Alberto Eagle MD;  Location: [facility];  Service: Pain Management;  Laterality: Left;    OOPHORECTOMY Right        Review of patient's allergies indicates:   Allergen Reactions    Penicillins Hives       No current facility-administered medications on file prior to encounter.     Current Outpatient Medications on File Prior to Encounter   Medication Sig    albuterol-ipratropium (DUO-NEB) 2.5 mg-0.5 mg/3 mL nebulizer solution Take 3 mLs by nebulization every 6 (six) hours as needed for Wheezing or Shortness of Breath. Rescue    baclofen (LIORESAL) 10 MG tablet Take 1 tablet (10 mg total) by mouth nightly as needed (msucle pain).    fluticasone propionate (FLONASE) 50 mcg/actuation nasal spray 1 spray by Each Nostril route 2 (two) times daily.    fluticasone-umeclidin-vilanter (TRELEGY ELLIPTA) 200-62.5-25 mcg inhaler Inhale 1 puff into the lungs once daily.    furosemide (LASIX) 40 MG tablet TAKE 1 TABLET DAILY (Patient taking differently: Take 40 mg by mouth once daily.)    gabapentin (NEURONTIN) 100 MG capsule Take 1 capsule (100 mg total) by mouth 3 (three) times daily.    LINZESS 145 mcg Cap capsule Take 145 mcg by mouth once daily.    lisinopriL-hydrochlorothiazide (PRINZIDE,ZESTORETIC) 20-12.5 mg per tablet Take 2 tablets by mouth once daily.    mesalamine (LIALDA) 1.2 gram TbEC Take 2.4 g by mouth daily with breakfast. 2 tablets    ondansetron (ZOFRAN-ODT) 4 MG TbDL Take 1 tablet (4 mg total) by mouth every 8 (eight) hours as needed (nausea and vomiting).     RABEprazole (ACIPHEX) 20 mg tablet Take 20 mg by mouth once daily.    rOPINIRole (REQUIP) 0.5 MG tablet TAKE 1 TABLET EVERY EVENING    sertraline (ZOLOFT) 100 MG tablet TAKE 2 TABLETS ONCE DAILY (Patient taking differently: Take 200 mg by mouth once daily. 2 tablets)    sucralfate (CARAFATE) 1 gram tablet Take 1 g by mouth 2 (two) times daily.    traMADoL (ULTRAM) 50 mg tablet Take 50 mg by mouth as needed for Pain.    ELIQUIS 2.5 mg Tab Take 2.5 mg by mouth 2 (two) times daily. (Patient not taking: Reported on 12/27/2024)    ergocalciferol (ERGOCALCIFEROL) 50,000 unit Cap Vitamin D2 1,250 mcg (50,000 unit) capsule   Take 1 capsule every week by oral route. (Patient not taking: Reported on 12/27/2024)    folic acid (FOLVITE) 1 MG tablet Take 1 tablet (1 mg total) by mouth once daily. (Patient not taking: Reported on 12/27/2024)    ibuprofen (ADVIL,MOTRIN) 800 MG tablet Take 1 tablet (800 mg total) by mouth every 8 (eight) hours as needed for Pain. (Patient not taking: Reported on 12/27/2024)    MS CONTIN 15 mg 12 hr tablet Take 15 mg by mouth every 12 (twelve) hours as needed. (Patient not taking: Reported on 12/27/2024)    oxyCODONE-acetaminophen (PERCOCET)  mg per tablet Take by mouth. (Patient not taking: Reported on 12/27/2024)    [DISCONTINUED] cetirizine (ZYRTEC) 10 MG tablet Take 10 mg by mouth once daily.    [DISCONTINUED] cyanocobalamin, vitamin B-12, 1,000 mcg Subl Place 1,000 mcg under the tongue once daily.    [DISCONTINUED] HYDROcodone-acetaminophen (NORCO)  mg per tablet Take 1 tablet by mouth every 8 (eight) hours as needed for Pain.    [DISCONTINUED] nicotine (NICODERM CQ) 21 mg/24 hr Place 2 patches onto the skin once daily. (Patient not taking: Reported on 1/2/2024)     Family History       Problem Relation (Age of Onset)    COPD Father, Mother    Colon cancer Maternal Grandmother    Lung cancer Father    Multiple sclerosis Son          Tobacco Use    Smoking status: Every Day      Current packs/day: 2.50     Average packs/day: 2.5 packs/day for 50.0 years (125.0 ttl pk-yrs)     Types: Cigarettes     Start date: 1975    Smokeless tobacco: Never    Tobacco comments:     Enrolled in the [employer] on 7/1/21 ([facility] Member ID # [id number]). Ambulatory referral to Smoking Cessation program.    Substance and Sexual Activity    Alcohol use: Never    Drug use: No    Sexual activity: Yes     Partners: Male     Birth control/protection: None     Review of Systems   Constitutional:  Positive for activity change, appetite change, chills and fever.   HENT:  Positive for rhinorrhea. Negative for congestion, facial swelling, trouble swallowing and voice change.    Eyes:  Negative for photophobia, redness and visual disturbance.   Respiratory:  Positive for cough, shortness of breath and wheezing.    Cardiovascular:  Negative for chest pain, palpitations and leg swelling.   Gastrointestinal:  Negative for abdominal distention, abdominal pain, diarrhea, nausea and vomiting.   Endocrine: Negative for polyphagia and polyuria.   Genitourinary:  Negative for difficulty urinating and dysuria.   Musculoskeletal:  Negative for joint swelling.   Skin:  Negative for color change and wound.   Neurological:  Positive for headaches. Negative for tremors, weakness and light-headedness.   Psychiatric/Behavioral:  Negative for agitation, behavioral problems and suicidal ideas. The patient is not nervous/anxious.      Objective:     Vital Signs (Most Recent):  Temp: 99.6 °F (37.6 °C) (12/27/24 1543)  Pulse: 88 (12/27/24 1552)  Resp: 18 (12/27/24 1552)  BP: 115/63 (12/27/24 1543)  SpO2: 95 % (12/27/24 1552) Vital Signs (24h Range):  Temp:  [98.6 °F (37 °C)-99.6 °F (37.6 °C)] 99.6 °F (37.6 °C)  Pulse:  [84-95] 88  Resp:  [18-34] 18  SpO2:  [85 %-95 %] 95 %  BP: (115-159)/(56-81) 115/63     Weight: 123.4 kg (272 lb 0.8 oz)  Body mass index is 42.61 kg/m².     Physical Exam  Constitutional:       General: She is in acute distress.       Appearance: She is obese.   HENT:      Head: Normocephalic and atraumatic.      Nose: Rhinorrhea present.      Mouth/Throat:      Mouth: Mucous membranes are dry.      Pharynx: Oropharynx is clear.   Eyes:      General:         Right eye: No discharge.         Left eye: No discharge.      Pupils: Pupils are equal, round, and reactive to light.   Cardiovascular:      Rate and Rhythm: Normal rate and regular rhythm.      Pulses: Normal pulses.      Heart sounds: Normal heart sounds.   Pulmonary:      Effort: Respiratory distress present.      Breath sounds: Wheezing present.      Comments: Diffuse wheezing, sob, requiring O2, coughing, diminished BS to lower lung base A/P  Abdominal:      General: Bowel sounds are normal. There is no distension.      Palpations: Abdomen is soft.      Tenderness: There is no abdominal tenderness. There is no guarding.   Musculoskeletal:         General: Swelling present. Deformity: bilateral ankles.Normal range of motion.      Cervical back: Normal range of motion and neck supple.   Skin:     General: Skin is warm and dry.      Capillary Refill: Capillary refill takes 2 to 3 seconds.      Coloration: Skin is not jaundiced.      Findings: No erythema.   Neurological:      General: No focal deficit present.      Mental Status: She is alert and oriented to person, place, and time.      Sensory: No sensory deficit.      Motor: No weakness.   Psychiatric:         Mood and Affect: Mood normal.         Behavior: Behavior normal.              CRANIAL NERVES     CN III, IV, VI   Pupils are equal, round, and reactive to light.       Significant Labs: All pertinent labs within the past 24 hours have been reviewed.  CBC:   Recent Labs   Lab 12/27/24  0918   WBC 2.74*   HGB 9.3*   HCT 30.8*        CMP:   Recent Labs   Lab 12/27/24  0918   *   K 3.3*      CO2 28   *   BUN 12   CREATININE 0.55   CALCIUM 8.9   PROT 6.8   ALBUMIN 4.0   BILITOT 0.3   ALKPHOS 86   AST 18  "  ALT 14   ANIONGAP 6*     Magnesium:   Recent Labs   Lab 12/27/24  0918   MG 2.0     Respiratory Culture: No results for input(s): "GSRESP", "RESPIRATORYC" in the last 48 hours.  Troponin:   Recent Labs   Lab 12/27/24  0918   TROPONINI <0.012     TSH: No results for input(s): "TSH" in the last 4320 hours.    Significant Imaging: I have reviewed all pertinent imaging results/findings within the past 24 hours.  "

## 2024-12-27 NOTE — PHARMACY MED REC
"[facility]           Pharmacy  Admission Medication History     The home medication history was taken by Nidia Lion.      Medication history obtained from Medications listed below were obtained from: Patient/family    Based on information gathered for medication list, you may go to "Admission" then "Reconcile Home Medications" tabs to review and/or act upon those items.     The home medication list has been updated by the Pharmacy department.   Please read ALL comments highlighted in yellow.   Please address this information as you see fit.    Feel free to contact us if you have any questions or require assistance.    The medications listed below were removed from the home medication list.  Please reorder if appropriate:    Patient reports NOT TAKING the following medication(s):  B12 1000mcg  Zyrtec 10mg  Norco 10-325mg  Nicoderm cq 21mg      No current facility-administered medications on file prior to encounter.     Current Outpatient Medications on File Prior to Encounter   Medication Sig Dispense Refill    albuterol-ipratropium (DUO-NEB) 2.5 mg-0.5 mg/3 mL nebulizer solution Take 3 mLs by nebulization every 6 (six) hours as needed for Wheezing or Shortness of Breath. Rescue 180 mL 2    baclofen (LIORESAL) 10 MG tablet Take 1 tablet (10 mg total) by mouth nightly as needed (msucle pain). 90 tablet 5    fluticasone propionate (FLONASE) 50 mcg/actuation nasal spray 1 spray by Each Nostril route 2 (two) times daily.      fluticasone-umeclidin-vilanter (TRELEGY ELLIPTA) 200-62.5-25 mcg inhaler Inhale 1 puff into the lungs once daily. 60 each 11    furosemide (LASIX) 40 MG tablet TAKE 1 TABLET DAILY (Patient taking differently: Take 40 mg by mouth once daily.) 90 tablet 3    gabapentin (NEURONTIN) 100 MG capsule Take 1 capsule (100 mg total) by mouth 3 (three) times daily. 270 capsule 11    LINZESS 145 mcg Cap capsule Take 145 mcg by mouth once daily.      lisinopriL-hydrochlorothiazide " (PRINZIDE,ZESTORETIC) 20-12.5 mg per tablet Take 2 tablets by mouth once daily. 180 tablet 11    mesalamine (LIALDA) 1.2 gram TbEC Take 2.4 g by mouth daily with breakfast. 2 tablets      ondansetron (ZOFRAN-ODT) 4 MG TbDL Take 1 tablet (4 mg total) by mouth every 8 (eight) hours as needed (nausea and vomiting). 9 tablet 0    RABEprazole (ACIPHEX) 20 mg tablet Take 20 mg by mouth once daily.      rOPINIRole (REQUIP) 0.5 MG tablet TAKE 1 TABLET EVERY EVENING 90 tablet 3    sertraline (ZOLOFT) 100 MG tablet TAKE 2 TABLETS ONCE DAILY (Patient taking differently: Take 200 mg by mouth once daily. 2 tablets) 180 tablet 3    sucralfate (CARAFATE) 1 gram tablet Take 1 g by mouth 2 (two) times daily.      traMADoL (ULTRAM) 50 mg tablet Take 50 mg by mouth as needed for Pain.         Please address this information as you see fit.  Feel free to contact us if you have any questions or require assistance.    Nidia Lion  [phone]                  .

## 2024-12-27 NOTE — H&P
[facility] Medicine  History & Physical    Patient Name: Umu Clayton  MRN: [id number]  Patient Class: OP- Observation  Admission Date: 12/27/2024  Attending Physician: Kae Devlin MD   Primary Care Provider: Mike Mcdermott MD         Patient information was obtained from patient and ER records.     Subjective:     Principal Problem:COPD exacerbation    Chief Complaint:   Chief Complaint   Patient presents with    Shortness of Breath     C/o SoB since yesterday. Hx of copd.          HPI: Umu Clayton a 64-year old female with PMHx of restless leg, L-hip OA, HTN, stomach ulcer, GERD, emphysema, and anxiety transferred from [facility] ER to [place] for increase SOB. Patient states she's has been SOB with exertion, coughing, not feeling well, decrease appetite, and having a HA since Tuesday. Patient gives an account of having a fever of 100.5F at home, coughing, chills, feeling tired, runny nose, and HA, but denies N/V, CP, and palpitations. She's been smoking 2-packs of cigarettes since age 17. She took all her home medication yesterday including using her albuterol inhaler with no relief.      ER Course: WBC 2.74, H/H 9.3/30.8, K 3.3, NT-proBNP 1950, trop negative, Flu A positive, Flu B negative, COVID negative. Chest xray: chronic lung disease.  No acute pulmonary finding no pneumothorax or pleural effusion.  No acute osseous finding. , EKG: A-fib with junctional pacemaker. Medication: tylenol 650mg albuterol, dexamethason 8mg, ipratropium, potassium BiCarb 50mEq. Vitals: 99.6F, 115/63 (80), 84, 18/, 94% on 2L O2.    Past Medical History:   Diagnosis Date    Anxiety     Emphysema lung     GERD (gastroesophageal reflux disease)     History of stomach ulcers     Hypertension     Osteoarthritis of hip     Restless leg syndrome     Ulcerative colitis        Past Surgical History:   Procedure Laterality Date    gastric by pass      HERNIA REPAIR  1998    HYSTERECTOMY      INJECTION OF JOINT Left  8/28/2024    Procedure: Left IA Hip Joint injection;  Surgeon: Mario Alberto Eagle MD;  Location: [facility];  Service: Pain Management;  Laterality: Left;    OOPHORECTOMY Right        Review of patient's allergies indicates:   Allergen Reactions    Penicillins Hives       No current facility-administered medications on file prior to encounter.     Current Outpatient Medications on File Prior to Encounter   Medication Sig    albuterol-ipratropium (DUO-NEB) 2.5 mg-0.5 mg/3 mL nebulizer solution Take 3 mLs by nebulization every 6 (six) hours as needed for Wheezing or Shortness of Breath. Rescue    baclofen (LIORESAL) 10 MG tablet Take 1 tablet (10 mg total) by mouth nightly as needed (msucle pain).    fluticasone propionate (FLONASE) 50 mcg/actuation nasal spray 1 spray by Each Nostril route 2 (two) times daily.    fluticasone-umeclidin-vilanter (TRELEGY ELLIPTA) 200-62.5-25 mcg inhaler Inhale 1 puff into the lungs once daily.    furosemide (LASIX) 40 MG tablet TAKE 1 TABLET DAILY (Patient taking differently: Take 40 mg by mouth once daily.)    gabapentin (NEURONTIN) 100 MG capsule Take 1 capsule (100 mg total) by mouth 3 (three) times daily.    LINZESS 145 mcg Cap capsule Take 145 mcg by mouth once daily.    lisinopriL-hydrochlorothiazide (PRINZIDE,ZESTORETIC) 20-12.5 mg per tablet Take 2 tablets by mouth once daily.    mesalamine (LIALDA) 1.2 gram TbEC Take 2.4 g by mouth daily with breakfast. 2 tablets    ondansetron (ZOFRAN-ODT) 4 MG TbDL Take 1 tablet (4 mg total) by mouth every 8 (eight) hours as needed (nausea and vomiting).    RABEprazole (ACIPHEX) 20 mg tablet Take 20 mg by mouth once daily.    rOPINIRole (REQUIP) 0.5 MG tablet TAKE 1 TABLET EVERY EVENING    sertraline (ZOLOFT) 100 MG tablet TAKE 2 TABLETS ONCE DAILY (Patient taking differently: Take 200 mg by mouth once daily. 2 tablets)    sucralfate (CARAFATE) 1 gram tablet Take 1 g by mouth 2 (two) times daily.    traMADoL (ULTRAM) 50 mg tablet Take 50  mg by mouth as needed for Pain.    ELIQUIS 2.5 mg Tab Take 2.5 mg by mouth 2 (two) times daily. (Patient not taking: Reported on 12/27/2024)    ergocalciferol (ERGOCALCIFEROL) 50,000 unit Cap Vitamin D2 1,250 mcg (50,000 unit) capsule   Take 1 capsule every week by oral route. (Patient not taking: Reported on 12/27/2024)    folic acid (FOLVITE) 1 MG tablet Take 1 tablet (1 mg total) by mouth once daily. (Patient not taking: Reported on 12/27/2024)    ibuprofen (ADVIL,MOTRIN) 800 MG tablet Take 1 tablet (800 mg total) by mouth every 8 (eight) hours as needed for Pain. (Patient not taking: Reported on 12/27/2024)    MS CONTIN 15 mg 12 hr tablet Take 15 mg by mouth every 12 (twelve) hours as needed. (Patient not taking: Reported on 12/27/2024)    oxyCODONE-acetaminophen (PERCOCET)  mg per tablet Take by mouth. (Patient not taking: Reported on 12/27/2024)    [DISCONTINUED] cetirizine (ZYRTEC) 10 MG tablet Take 10 mg by mouth once daily.    [DISCONTINUED] cyanocobalamin, vitamin B-12, 1,000 mcg Subl Place 1,000 mcg under the tongue once daily.    [DISCONTINUED] HYDROcodone-acetaminophen (NORCO)  mg per tablet Take 1 tablet by mouth every 8 (eight) hours as needed for Pain.    [DISCONTINUED] nicotine (NICODERM CQ) 21 mg/24 hr Place 2 patches onto the skin once daily. (Patient not taking: Reported on 1/2/2024)     Family History       Problem Relation (Age of Onset)    COPD Father, Mother    Colon cancer Maternal Grandmother    Lung cancer Father    Multiple sclerosis Son          Tobacco Use    Smoking status: Every Day     Current packs/day: 2.50     Average packs/day: 2.5 packs/day for 50.0 years (125.0 ttl pk-yrs)     Types: Cigarettes     Start date: 1975    Smokeless tobacco: Never    Tobacco comments:     Enrolled in the [employer] on 7/1/21 ([facility] Member ID # [id number]). Ambulatory referral to Smoking Cessation program.    Substance and Sexual Activity    Alcohol use: Never    Drug use: No    Sexual  activity: Yes     Partners: Male     Birth control/protection: None     Review of Systems   Constitutional:  Positive for activity change, appetite change, chills and fever.   HENT:  Positive for rhinorrhea. Negative for congestion, facial swelling, trouble swallowing and voice change.    Eyes:  Negative for photophobia, redness and visual disturbance.   Respiratory:  Positive for cough, shortness of breath and wheezing.    Cardiovascular:  Negative for chest pain, palpitations and leg swelling.   Gastrointestinal:  Negative for abdominal distention, abdominal pain, diarrhea, nausea and vomiting.   Endocrine: Negative for polyphagia and polyuria.   Genitourinary:  Negative for difficulty urinating and dysuria.   Musculoskeletal:  Negative for joint swelling.   Skin:  Negative for color change and wound.   Neurological:  Positive for headaches. Negative for tremors, weakness and light-headedness.   Psychiatric/Behavioral:  Negative for agitation, behavioral problems and suicidal ideas. The patient is not nervous/anxious.      Objective:     Vital Signs (Most Recent):  Temp: 99.6 °F (37.6 °C) (12/27/24 1543)  Pulse: 88 (12/27/24 1552)  Resp: 18 (12/27/24 1552)  BP: 115/63 (12/27/24 1543)  SpO2: 95 % (12/27/24 1552) Vital Signs (24h Range):  Temp:  [98.6 °F (37 °C)-99.6 °F (37.6 °C)] 99.6 °F (37.6 °C)  Pulse:  [84-95] 88  Resp:  [18-34] 18  SpO2:  [85 %-95 %] 95 %  BP: (115-159)/(56-81) 115/63     Weight: 123.4 kg (272 lb 0.8 oz)  Body mass index is 42.61 kg/m².     Physical Exam  Constitutional:       General: She is in acute distress.      Appearance: She is obese.   HENT:      Head: Normocephalic and atraumatic.      Nose: Rhinorrhea present.      Mouth/Throat:      Mouth: Mucous membranes are dry.      Pharynx: Oropharynx is clear.   Eyes:      General:         Right eye: No discharge.         Left eye: No discharge.      Pupils: Pupils are equal, round, and reactive to light.   Cardiovascular:      Rate and  "Rhythm: Normal rate and regular rhythm.      Pulses: Normal pulses.      Heart sounds: Normal heart sounds.   Pulmonary:      Effort: Respiratory distress present.      Breath sounds: Wheezing present.      Comments: Diffuse wheezing, sob, requiring O2, coughing, diminished BS to lower lung base A/P  Abdominal:      General: Bowel sounds are normal. There is no distension.      Palpations: Abdomen is soft.      Tenderness: There is no abdominal tenderness. There is no guarding.   Musculoskeletal:         General: Swelling present. Deformity: bilateral ankles.Normal range of motion.      Cervical back: Normal range of motion and neck supple.   Skin:     General: Skin is warm and dry.      Capillary Refill: Capillary refill takes 2 to 3 seconds.      Coloration: Skin is not jaundiced.      Findings: No erythema.   Neurological:      General: No focal deficit present.      Mental Status: She is alert and oriented to person, place, and time.      Sensory: No sensory deficit.      Motor: No weakness.   Psychiatric:         Mood and Affect: Mood normal.         Behavior: Behavior normal.              CRANIAL NERVES     CN III, IV, VI   Pupils are equal, round, and reactive to light.       Significant Labs: All pertinent labs within the past 24 hours have been reviewed.  CBC:   Recent Labs   Lab 12/27/24  0918   WBC 2.74*   HGB 9.3*   HCT 30.8*        CMP:   Recent Labs   Lab 12/27/24  0918   *   K 3.3*      CO2 28   *   BUN 12   CREATININE 0.55   CALCIUM 8.9   PROT 6.8   ALBUMIN 4.0   BILITOT 0.3   ALKPHOS 86   AST 18   ALT 14   ANIONGAP 6*     Magnesium:   Recent Labs   Lab 12/27/24  0918   MG 2.0     Respiratory Culture: No results for input(s): "GSRESP", "RESPIRATORYC" in the last 48 hours.  Troponin:   Recent Labs   Lab 12/27/24  0918   TROPONINI <0.012     TSH: No results for input(s): "TSH" in the last 4320 hours.    Significant Imaging: I have reviewed all pertinent imaging " results/findings within the past 24 hours.  Assessment/Plan:     * COPD exacerbation  Patient's COPD is with exacerbation noted by continued dyspnea currently.  Patient is currently on COPD Pathway. Continue scheduled inhalers Steroids and Supplemental oxygen and monitor respiratory status closely.     -nebulizer treatment q4 and PRN  -O2 at 2L supplemental  -Prednisone 40mg       Influenza A  -Flu A positive  -oseltamivir 75mg pO BID  -tylenol for fever PRN    Volume overload  -BNP: 1949, troponin negative, no CP, EKG with Afib with junctional rhythm.   -Echo pending, will consider cardiology consult if echo returns positive  -Lasix 40mg IV        Tobacco dependence  Dangers of cigarette smoking were reviewed with patient in detail. Patient was Counseled for 3-10 minutes. Nicotine replacement options were discussed. Nicotine replacement was discussed- prescribed    Smoker  -smoking cessation encouraged  -nicotine patch        VTE Risk Mitigation (From admission, onward)           Ordered     enoxaparin injection 40 mg  Daily         12/27/24 1539     IP VTE HIGH RISK PATIENT  Once         12/27/24 1539     Place sequential compression device  Until discontinued         12/27/24 1539                         On 12/27/2024, patient should be placed in hospital observation services under my care in collaboration with Dr. GURPREET Davenport with Hospital Medicine Services.           Kerry Menard DNP  Department of Hospital Medicine  [place] - Telemetry

## 2024-12-28 PROBLEM — J96.01 ACUTE HYPOXEMIC RESPIRATORY FAILURE: Status: ACTIVE | Noted: 2024-12-28

## 2024-12-28 LAB
ANION GAP SERPL CALC-SCNC: 9 MMOL/L (ref 8–16)
BASOPHILS # BLD AUTO: 0 K/UL (ref 0–0.2)
BASOPHILS NFR BLD: 0 % (ref 0–1.9)
BUN SERPL-MCNC: 16 MG/DL (ref 8–23)
CALCIUM SERPL-MCNC: 8.9 MG/DL (ref 8.7–10.5)
CHLORIDE SERPL-SCNC: 103 MMOL/L (ref 95–110)
CO2 SERPL-SCNC: 27 MMOL/L (ref 23–29)
CREAT SERPL-MCNC: 0.7 MG/DL (ref 0.5–1.4)
DIFFERENTIAL METHOD BLD: ABNORMAL
EOSINOPHIL # BLD AUTO: 0 K/UL (ref 0–0.5)
EOSINOPHIL NFR BLD: 0 % (ref 0–8)
ERYTHROCYTE [DISTWIDTH] IN BLOOD BY AUTOMATED COUNT: 14.6 % (ref 11.5–14.5)
EST. GFR  (NO RACE VARIABLE): >60 ML/MIN/1.73 M^2
GLUCOSE SERPL-MCNC: 121 MG/DL (ref 70–110)
HCT VFR BLD AUTO: 28.4 % (ref 37–48.5)
HGB BLD-MCNC: 8.5 G/DL (ref 12–16)
IMM GRANULOCYTES # BLD AUTO: 0.02 K/UL (ref 0–0.04)
IMM GRANULOCYTES NFR BLD AUTO: 0.9 % (ref 0–0.5)
LYMPHOCYTES # BLD AUTO: 0.2 K/UL (ref 1–4.8)
LYMPHOCYTES NFR BLD: 8.5 % (ref 18–48)
MAGNESIUM SERPL-MCNC: 2.2 MG/DL (ref 1.6–2.6)
MCH RBC QN AUTO: 25.9 PG (ref 27–31)
MCHC RBC AUTO-ENTMCNC: 29.9 G/DL (ref 32–36)
MCV RBC AUTO: 87 FL (ref 82–98)
MONOCYTES # BLD AUTO: 0.2 K/UL (ref 0.3–1)
MONOCYTES NFR BLD: 8.5 % (ref 4–15)
NEUTROPHILS # BLD AUTO: 1.8 K/UL (ref 1.8–7.7)
NEUTROPHILS NFR BLD: 82.1 % (ref 38–73)
NRBC BLD-RTO: 0 /100 WBC
PHOSPHATE SERPL-MCNC: 3.7 MG/DL (ref 2.7–4.5)
PLATELET # BLD AUTO: 147 K/UL (ref 150–450)
PMV BLD AUTO: 10.2 FL (ref 9.2–12.9)
POTASSIUM SERPL-SCNC: 3.6 MMOL/L (ref 3.5–5.1)
RBC # BLD AUTO: 3.28 M/UL (ref 4–5.4)
SODIUM SERPL-SCNC: 139 MMOL/L (ref 136–145)
WBC # BLD AUTO: 2.23 K/UL (ref 3.9–12.7)

## 2024-12-28 PROCEDURE — 94761 N-INVAS EAR/PLS OXIMETRY MLT: CPT

## 2024-12-28 PROCEDURE — G0378 HOSPITAL OBSERVATION PER HR: HCPCS

## 2024-12-28 PROCEDURE — 94640 AIRWAY INHALATION TREATMENT: CPT | Mod: XB

## 2024-12-28 PROCEDURE — 63600175 PHARM REV CODE 636 W HCPCS

## 2024-12-28 PROCEDURE — 96376 TX/PRO/DX INJ SAME DRUG ADON: CPT

## 2024-12-28 PROCEDURE — S4991 NICOTINE PATCH NONLEGEND: HCPCS

## 2024-12-28 PROCEDURE — 80048 BASIC METABOLIC PNL TOTAL CA: CPT

## 2024-12-28 PROCEDURE — 36415 COLL VENOUS BLD VENIPUNCTURE: CPT

## 2024-12-28 PROCEDURE — 25000003 PHARM REV CODE 250: Performed by: FAMILY MEDICINE

## 2024-12-28 PROCEDURE — 83735 ASSAY OF MAGNESIUM: CPT

## 2024-12-28 PROCEDURE — 25000003 PHARM REV CODE 250: Performed by: STUDENT IN AN ORGANIZED HEALTH CARE EDUCATION/TRAINING PROGRAM

## 2024-12-28 PROCEDURE — 25000242 PHARM REV CODE 250 ALT 637 W/ HCPCS

## 2024-12-28 PROCEDURE — 99900035 HC TECH TIME PER 15 MIN (STAT)

## 2024-12-28 PROCEDURE — 96372 THER/PROPH/DIAG INJ SC/IM: CPT

## 2024-12-28 PROCEDURE — 25000003 PHARM REV CODE 250

## 2024-12-28 PROCEDURE — 27000221 HC OXYGEN, UP TO 24 HOURS

## 2024-12-28 PROCEDURE — 85025 COMPLETE CBC W/AUTO DIFF WBC: CPT

## 2024-12-28 PROCEDURE — 84100 ASSAY OF PHOSPHORUS: CPT

## 2024-12-28 PROCEDURE — 25000003 PHARM REV CODE 250: Performed by: REGISTERED NURSE

## 2024-12-28 RX ORDER — BUTALBITAL, ACETAMINOPHEN AND CAFFEINE 50; 325; 40 MG/1; MG/1; MG/1
1 TABLET ORAL EVERY 6 HOURS PRN
Status: DISCONTINUED | OUTPATIENT
Start: 2024-12-28 | End: 2024-12-30 | Stop reason: HOSPADM

## 2024-12-28 RX ORDER — BENZONATATE 100 MG/1
100 CAPSULE ORAL 3 TIMES DAILY PRN
Status: DISCONTINUED | OUTPATIENT
Start: 2024-12-28 | End: 2024-12-30 | Stop reason: HOSPADM

## 2024-12-28 RX ORDER — BUTALBITAL, ACETAMINOPHEN AND CAFFEINE 50; 325; 40 MG/1; MG/1; MG/1
1 TABLET ORAL ONCE
Status: COMPLETED | OUTPATIENT
Start: 2024-12-28 | End: 2024-12-28

## 2024-12-28 RX ADMIN — GABAPENTIN 100 MG: 100 CAPSULE ORAL at 08:12

## 2024-12-28 RX ADMIN — IPRATROPIUM BROMIDE AND ALBUTEROL SULFATE 3 ML: 2.5; .5 SOLUTION RESPIRATORY (INHALATION) at 03:12

## 2024-12-28 RX ADMIN — OSELTAMIVIR PHOSPHATE 75 MG: 75 CAPSULE ORAL at 08:12

## 2024-12-28 RX ADMIN — ACETAMINOPHEN 650 MG: 325 TABLET ORAL at 08:12

## 2024-12-28 RX ADMIN — PANTOPRAZOLE SODIUM 40 MG: 40 TABLET, DELAYED RELEASE ORAL at 08:12

## 2024-12-28 RX ADMIN — SERTRALINE HYDROCHLORIDE 100 MG: 100 TABLET ORAL at 08:12

## 2024-12-28 RX ADMIN — BUTALBITAL, ACETAMINOPHEN, AND CAFFEINE 1 TABLET: 325; 50; 40 TABLET ORAL at 10:12

## 2024-12-28 RX ADMIN — MESALAMINE 2.4 G: 1.2 TABLET, DELAYED RELEASE ORAL at 08:12

## 2024-12-28 RX ADMIN — BUTALBITAL, ACETAMINOPHEN, AND CAFFEINE 1 TABLET: 325; 50; 40 TABLET ORAL at 05:12

## 2024-12-28 RX ADMIN — ONDANSETRON 8 MG: 8 TABLET, ORALLY DISINTEGRATING ORAL at 05:12

## 2024-12-28 RX ADMIN — ONDANSETRON 8 MG: 8 TABLET, ORALLY DISINTEGRATING ORAL at 08:12

## 2024-12-28 RX ADMIN — SUCRALFATE 1 G: 1 TABLET ORAL at 08:12

## 2024-12-28 RX ADMIN — LISINOPRIL 40 MG: 20 TABLET ORAL at 08:12

## 2024-12-28 RX ADMIN — PREDNISONE 40 MG: 20 TABLET ORAL at 08:12

## 2024-12-28 RX ADMIN — BUTALBITAL, ACETAMINOPHEN, AND CAFFEINE 1 TABLET: 325; 50; 40 TABLET ORAL at 02:12

## 2024-12-28 RX ADMIN — ENOXAPARIN SODIUM 40 MG: 40 INJECTION SUBCUTANEOUS at 05:12

## 2024-12-28 RX ADMIN — IPRATROPIUM BROMIDE AND ALBUTEROL SULFATE 3 ML: 2.5; .5 SOLUTION RESPIRATORY (INHALATION) at 07:12

## 2024-12-28 RX ADMIN — GABAPENTIN 100 MG: 100 CAPSULE ORAL at 02:12

## 2024-12-28 RX ADMIN — IPRATROPIUM BROMIDE AND ALBUTEROL SULFATE 3 ML: 2.5; .5 SOLUTION RESPIRATORY (INHALATION) at 08:12

## 2024-12-28 RX ADMIN — FUROSEMIDE 40 MG: 10 INJECTION, SOLUTION INTRAMUSCULAR; INTRAVENOUS at 08:12

## 2024-12-28 RX ADMIN — HYDROCHLOROTHIAZIDE 25 MG: 25 TABLET ORAL at 08:12

## 2024-12-28 RX ADMIN — LINACLOTIDE 145 MCG: 145 CAPSULE, GELATIN COATED ORAL at 08:12

## 2024-12-28 RX ADMIN — IPRATROPIUM BROMIDE AND ALBUTEROL SULFATE 3 ML: 2.5; .5 SOLUTION RESPIRATORY (INHALATION) at 11:12

## 2024-12-28 RX ADMIN — ROPINIROLE HYDROCHLORIDE 0.5 MG: 0.25 TABLET, FILM COATED ORAL at 08:12

## 2024-12-28 RX ADMIN — NICOTINE 1 PATCH: 21 PATCH, EXTENDED RELEASE TRANSDERMAL at 08:12

## 2024-12-28 NOTE — PROGRESS NOTES
[facility] Medicine  Progress Note    Patient Name: Umu Clayton  MRN: [id number]  Patient Class: OP- Observation   Admission Date: 12/27/2024  Length of Stay: 0 days  Attending Physician: Kae Devlin MD  Primary Care Provider: Mike Mcdermott MD        Subjective     Principal Problem:Acute hypoxemic respiratory failure        HPI:  Umu Clayton a 64-year old female with PMHx of restless leg, L-hip OA, HTN, stomach ulcer, GERD, emphysema, and anxiety transferred from [facility] ER to [place] for increase SOB. Patient states she's has been SOB with exertion, coughing, not feeling well, decrease appetite, and having a HA since Tuesday. Patient gives an account of having a fever of 100.5F at home, coughing, chills, feeling tired, runny nose, and HA, but denies N/V, CP, and palpitations. She's been smoking 2-packs of cigarettes since age 17. She took all her home medication yesterday including using her albuterol inhaler with no relief.      ER Course: WBC 2.74, H/H 9.3/30.8, K 3.3, NT-proBNP 1950, trop negative, Flu A positive, Flu B negative, COVID negative. Chest xray: chronic lung disease.  No acute pulmonary finding no pneumothorax or pleural effusion.  No acute osseous finding. , EKG: A-fib with junctional pacemaker. Medication: tylenol 650mg albuterol, dexamethason 8mg, ipratropium, potassium BiCarb 50mEq. Vitals: 99.6F, 115/63 (80), 84, 18/, 94% on 2L O2.    Overview/Hospital Course:  No notes on file    Interval History:  No acute events overnight.  Patient reports a bad headache that improved with Fioricet.  Reports photophobia.  States she feels better after coming to the hospital.  She has oxygen at home but does not use it on a continuous daily basis.  She is a current tobacco smoker.  Counseled on importance of cessation.    Review of Systems  Objective:     Vital Signs (Most Recent):  Temp: 98.1 °F (36.7 °C) (12/28/24 1530)  Pulse: 85 (12/28/24 1552)  Resp: 18 (12/28/24 1552)  BP: 138/83  (12/28/24 1530)  SpO2: (!) 92 % (12/28/24 1552) Vital Signs (24h Range):  Temp:  [98.1 °F (36.7 °C)-99.1 °F (37.3 °C)] 98.1 °F (36.7 °C)  Pulse:  [66-91] 85  Resp:  [18-21] 18  SpO2:  [92 %-96 %] 92 %  BP: (122-138)/(73-83) 138/83     Weight: 123.4 kg (272 lb 0.8 oz)  Body mass index is 42.61 kg/m².    Intake/Output Summary (Last 24 hours) at 12/28/2024 1640  Last data filed at 12/27/2024 2307  Gross per 24 hour   Intake 225 ml   Output --   Net 225 ml         Physical Exam  Vitals and nursing note reviewed.   Constitutional:       General: She is not in acute distress.     Appearance: She is obese. She is ill-appearing.   Cardiovascular:      Rate and Rhythm: Normal rate and regular rhythm.      Heart sounds: Normal heart sounds. No murmur heard.  Pulmonary:      Effort: Pulmonary effort is normal. No respiratory distress.      Breath sounds: Wheezing present.   Abdominal:      Palpations: Abdomen is soft.      Tenderness: There is no abdominal tenderness.   Musculoskeletal:      Right lower leg: No edema.      Left lower leg: No edema.   Skin:     General: Skin is warm and dry.      Findings: No bruising.   Neurological:      General: No focal deficit present.      Mental Status: She is alert and oriented to person, place, and time.   Psychiatric:         Mood and Affect: Mood normal.             Significant Labs: All pertinent labs within the past 24 hours have been reviewed.    Significant Imaging: I have reviewed all pertinent imaging results/findings within the past 24 hours.    Assessment and Plan     * Acute hypoxemic respiratory failure  Patient with Hypoxic Respiratory failure which is Acute.  she is not on home oxygen. Supplemental oxygen was provided and noted-      .   Signs/symptoms of respiratory failure include- increased work of breathing and respiratory distress. Contributing diagnoses includes - CHF, COPD, and influenza infection  Labs and images were reviewed. Patient Has not had a recent ABG.  Will treat underlying causes and adjust management of respiratory failure as follows- oseltamivir, scheduled inhalers, steroids    Influenza A  -Flu A positive  -oseltamivir 75mg pO BID    Suspect headaches in setting of flu infection.  If remained persistent, consider viral meningitis    Volume overload  No prior history of documented heart failure    -BNP: 1949, troponin negative, no CP, EKG with no acute ST-T changes  -Echo pending, consider Cardiology consult pending results  -continue IV diuresis        COPD exacerbation  Patient's COPD is with exacerbation noted by continued dyspnea currently.  Patient is currently on COPD Pathway. Continue scheduled inhalers Steroids and Supplemental oxygen and monitor respiratory status closely.     -nebulizer treatment q4 and PRN  -O2 at 2L supplemental  -Prednisone 40mg       Tobacco dependence  Dangers of cigarette smoking were reviewed with patient in detail. Patient was Counseled for 3-10 minutes. Nicotine replacement options were discussed. Nicotine replacement was discussed- prescribed    Smoker  -smoking cessation encouraged  -nicotine patch        VTE Risk Mitigation (From admission, onward)           Ordered     enoxaparin injection 40 mg  Daily         12/27/24 1539     IP VTE HIGH RISK PATIENT  Once         12/27/24 1539     Place sequential compression device  Until discontinued         12/27/24 1539                    Discharge Planning   SANJANA:      Code Status: Full Code   Medical Readiness for Discharge Date:                  Kae Devlin MD  Department of Hospital Medicine   [place] - [facility]

## 2024-12-28 NOTE — ASSESSMENT & PLAN NOTE
Patient with Hypoxic Respiratory failure which is Acute.  she is not on home oxygen. Supplemental oxygen was provided and noted-      .   Signs/symptoms of respiratory failure include- increased work of breathing and respiratory distress. Contributing diagnoses includes - CHF, COPD, and influenza infection  Labs and images were reviewed. Patient Has not had a recent ABG. Will treat underlying causes and adjust management of respiratory failure as follows- oseltamivir, scheduled inhalers, steroids

## 2024-12-28 NOTE — SUBJECTIVE & OBJECTIVE
Interval History:  No acute events overnight.  Patient reports a bad headache that improved with Fioricet.  Reports photophobia.  States she feels better after coming to the hospital.  She has oxygen at home but does not use it on a continuous daily basis.  She is a current tobacco smoker.  Counseled on importance of cessation.    Review of Systems  Objective:     Vital Signs (Most Recent):  Temp: 98.1 °F (36.7 °C) (12/28/24 1530)  Pulse: 85 (12/28/24 1552)  Resp: 18 (12/28/24 1552)  BP: 138/83 (12/28/24 1530)  SpO2: (!) 92 % (12/28/24 1552) Vital Signs (24h Range):  Temp:  [98.1 °F (36.7 °C)-99.1 °F (37.3 °C)] 98.1 °F (36.7 °C)  Pulse:  [66-91] 85  Resp:  [18-21] 18  SpO2:  [92 %-96 %] 92 %  BP: (122-138)/(73-83) 138/83     Weight: 123.4 kg (272 lb 0.8 oz)  Body mass index is 42.61 kg/m².    Intake/Output Summary (Last 24 hours) at 12/28/2024 1640  Last data filed at 12/27/2024 2307  Gross per 24 hour   Intake 225 ml   Output --   Net 225 ml         Physical Exam  Vitals and nursing note reviewed.   Constitutional:       General: She is not in acute distress.     Appearance: She is obese. She is ill-appearing.   Cardiovascular:      Rate and Rhythm: Normal rate and regular rhythm.      Heart sounds: Normal heart sounds. No murmur heard.  Pulmonary:      Effort: Pulmonary effort is normal. No respiratory distress.      Breath sounds: Wheezing present.   Abdominal:      Palpations: Abdomen is soft.      Tenderness: There is no abdominal tenderness.   Musculoskeletal:      Right lower leg: No edema.      Left lower leg: No edema.   Skin:     General: Skin is warm and dry.      Findings: No bruising.   Neurological:      General: No focal deficit present.      Mental Status: She is alert and oriented to person, place, and time.   Psychiatric:         Mood and Affect: Mood normal.             Significant Labs: All pertinent labs within the past 24 hours have been reviewed.    Significant Imaging: I have reviewed all  pertinent imaging results/findings within the past 24 hours.

## 2024-12-28 NOTE — PLAN OF CARE
VN note: Care plan, orders and labs reviewed.    Problem: Adult Inpatient Plan of Care  Goal: Plan of Care Review  Outcome: Progressing     Problem: Adult Inpatient Plan of Care  Goal: Patient-Specific Goal (Individualized)  Outcome: Progressing      Ivermectin Counseling:  Patient instructed to take medication on an empty stomach with a full glass of water.  Patient informed of potential adverse effects including but not limited to nausea, diarrhea, dizziness, itching, and swelling of the extremities or lymph nodes.  The patient verbalized understanding of the proper use and possible adverse effects of ivermectin.  All of the patient's questions and concerns were addressed.

## 2024-12-28 NOTE — ASSESSMENT & PLAN NOTE
No prior history of documented heart failure    -BNP: 1949, troponin negative, no CP, EKG with no acute ST-T changes  -Echo pending, consider Cardiology consult pending results  -continue IV diuresis

## 2024-12-28 NOTE — ASSESSMENT & PLAN NOTE
-Flu A positive  -oseltamivir 75mg pO BID    Suspect headaches in setting of flu infection.  If remained persistent, consider viral meningitis

## 2024-12-28 NOTE — PLAN OF CARE
Problem: Adult Inpatient Plan of Care  Goal: Plan of Care Review  Outcome: Progressing   VIRTUAL NURSE:  Labs, notes, orders, and careplan updated.

## 2024-12-29 LAB
ANION GAP SERPL CALC-SCNC: 8 MMOL/L (ref 8–16)
APICAL FOUR CHAMBER EJECTION FRACTION: 73 %
APICAL TWO CHAMBER EJECTION FRACTION: 65 %
ASCENDING AORTA: 3.65 CM
AV INDEX (PROSTH): 0.92
AV MEAN GRADIENT: 10.1 MMHG
AV PEAK GRADIENT: 16 MMHG
AV VALVE AREA BY VELOCITY RATIO: 2.8 CM²
AV VALVE AREA: 3.5 CM²
AV VELOCITY RATIO: 0.75
BASOPHILS # BLD AUTO: 0.01 K/UL (ref 0–0.2)
BASOPHILS NFR BLD: 0.3 % (ref 0–1.9)
BSA FOR ECHO PROCEDURE: 2.41 M2
BUN SERPL-MCNC: 21 MG/DL (ref 8–23)
CALCIUM SERPL-MCNC: 8.5 MG/DL (ref 8.7–10.5)
CHLORIDE SERPL-SCNC: 101 MMOL/L (ref 95–110)
CO2 SERPL-SCNC: 31 MMOL/L (ref 23–29)
CREAT SERPL-MCNC: 0.7 MG/DL (ref 0.5–1.4)
CV ECHO LV RWT: 0.39 CM
DIFFERENTIAL METHOD BLD: ABNORMAL
DOP CALC AO PEAK VEL: 2 M/S
DOP CALC AO VTI: 32.3 CM
DOP CALC LVOT AREA: 3.8 CM2
DOP CALC LVOT DIAMETER: 2.2 CM
DOP CALC LVOT PEAK VEL: 1.5 M/S
DOP CALC LVOT STROKE VOLUME: 112.5 CM3
DOP CALC MV VTI: 29 CM
DOP CALCLVOT PEAK VEL VTI: 29.6 CM
E WAVE DECELERATION TIME: 174.63 MSEC
E/A RATIO: 1.18
E/E' RATIO: 13.73 M/S
ECHO LV POSTERIOR WALL: 0.9 CM (ref 0.6–1.1)
EOSINOPHIL # BLD AUTO: 0 K/UL (ref 0–0.5)
EOSINOPHIL NFR BLD: 0 % (ref 0–8)
ERYTHROCYTE [DISTWIDTH] IN BLOOD BY AUTOMATED COUNT: 14.7 % (ref 11.5–14.5)
EST. GFR  (NO RACE VARIABLE): >60 ML/MIN/1.73 M^2
FRACTIONAL SHORTENING: 28.3 % (ref 28–44)
GLUCOSE SERPL-MCNC: 100 MG/DL (ref 70–110)
HCT VFR BLD AUTO: 28 % (ref 37–48.5)
HGB BLD-MCNC: 8.4 G/DL (ref 12–16)
IMM GRANULOCYTES # BLD AUTO: 0.01 K/UL (ref 0–0.04)
IMM GRANULOCYTES NFR BLD AUTO: 0.3 % (ref 0–0.5)
INTERVENTRICULAR SEPTUM: 0.9 CM (ref 0.6–1.1)
IVC DIAMETER: 2.55 CM
IVRT: 71.36 MSEC
LEFT ATRIUM AREA SYSTOLIC (APICAL 4 CHAMBER): 23.77 CM2
LEFT ATRIUM VOLUME INDEX MOD: 30 ML/M2
LEFT ATRIUM VOLUME MOD: 68.93 ML
LEFT INTERNAL DIMENSION IN SYSTOLE: 3.3 CM (ref 2.1–4)
LEFT VENTRICLE DIASTOLIC VOLUME INDEX: 41.4 ML/M2
LEFT VENTRICLE DIASTOLIC VOLUME: 95.23 ML
LEFT VENTRICLE END DIASTOLIC VOLUME APICAL 2 CHAMBER: 70.23 ML
LEFT VENTRICLE END DIASTOLIC VOLUME APICAL 4 CHAMBER: 95.61 ML
LEFT VENTRICLE END SYSTOLIC VOLUME APICAL 4 CHAMBER: 68.93 ML
LEFT VENTRICLE MASS INDEX: 59.9 G/M2
LEFT VENTRICLE SYSTOLIC VOLUME INDEX: 19.3 ML/M2
LEFT VENTRICLE SYSTOLIC VOLUME: 44.33 ML
LEFT VENTRICULAR INTERNAL DIMENSION IN DIASTOLE: 4.6 CM (ref 3.5–6)
LEFT VENTRICULAR MASS: 137.7 G
LV LATERAL E/E' RATIO: 12.88 M/S
LV SEPTAL E/E' RATIO: 14.71 M/S
LVED V (TEICH): 95.23 ML
LVES V (TEICH): 44.33 ML
LVOT MG: 5.71 MMHG
LVOT MV: 1.16 CM/S
LYMPHOCYTES # BLD AUTO: 0.4 K/UL (ref 1–4.8)
LYMPHOCYTES NFR BLD: 14.2 % (ref 18–48)
MAGNESIUM SERPL-MCNC: 2.1 MG/DL (ref 1.6–2.6)
MCH RBC QN AUTO: 25.8 PG (ref 27–31)
MCHC RBC AUTO-ENTMCNC: 30 G/DL (ref 32–36)
MCV RBC AUTO: 86 FL (ref 82–98)
MONOCYTES # BLD AUTO: 0.4 K/UL (ref 0.3–1)
MONOCYTES NFR BLD: 12.3 % (ref 4–15)
MV A" WAVE DURATION": 9.42 MSEC
MV MEAN GRADIENT: 3 MMHG
MV PEAK A VEL: 0.87 M/S
MV PEAK E VEL: 1.03 M/S
MV PEAK GRADIENT: 6 MMHG
MV STENOSIS PRESSURE HALF TIME: 50.64 MS
MV VALVE AREA BY CONTINUITY EQUATION: 3.88 CM2
MV VALVE AREA P 1/2 METHOD: 4.34 CM2
NEUTROPHILS # BLD AUTO: 2.3 K/UL (ref 1.8–7.7)
NEUTROPHILS NFR BLD: 72.9 % (ref 38–73)
NRBC BLD-RTO: 0 /100 WBC
OHS CV RV/LV RATIO: 0.8 CM
OHS LV EJECTION FRACTION SIMPSONS BIPLANE MOD: 69 %
PHOSPHATE SERPL-MCNC: 3.4 MG/DL (ref 2.7–4.5)
PISA TR MAX VEL: 3.48 M/S
PLATELET # BLD AUTO: 159 K/UL (ref 150–450)
PMV BLD AUTO: 11 FL (ref 9.2–12.9)
POTASSIUM SERPL-SCNC: 3.3 MMOL/L (ref 3.5–5.1)
PULM VEIN S/D RATIO: 1.44
PV MV: 0.93 M/S
PV PEAK D VEL: 0.18 M/S
PV PEAK GRADIENT: 5 MMHG
PV PEAK S VEL: 0.26 M/S
PV PEAK VELOCITY: 1.12 M/S
RA PRESSURE ESTIMATED: 3 MMHG
RA VOL SYS: 48.84 ML
RBC # BLD AUTO: 3.25 M/UL (ref 4–5.4)
RIGHT ATRIAL AREA: 16.1 CM2
RIGHT ATRIUM VOLUME AREA LENGTH APICAL 4 CHAMBER: 47.22 ML
RIGHT VENTRICLE DIASTOLIC BASEL DIMENSION: 3.7 CM
RV TB RVSP: 6 MMHG
RV TISSUE DOPPLER FREE WALL SYSTOLIC VELOCITY 1 (APICAL 4 CHAMBER VIEW): 13.14 CM/S
SINUS: 3.27 CM
SODIUM SERPL-SCNC: 140 MMOL/L (ref 136–145)
STJ: 3.35 CM
TDI LATERAL: 0.08 M/S
TDI SEPTAL: 0.07 M/S
TDI: 0.08 M/S
TR MAX PG: 48 MMHG
TRICUSPID ANNULAR PLANE SYSTOLIC EXCURSION: 2.95 CM
TV REST PULMONARY ARTERY PRESSURE: 51 MMHG
WBC # BLD AUTO: 3.1 K/UL (ref 3.9–12.7)
Z-SCORE OF LEFT VENTRICULAR DIMENSION IN END DIASTOLE: -6.61
Z-SCORE OF LEFT VENTRICULAR DIMENSION IN END SYSTOLE: -3.86

## 2024-12-29 PROCEDURE — 27000207 HC ISOLATION

## 2024-12-29 PROCEDURE — S4991 NICOTINE PATCH NONLEGEND: HCPCS

## 2024-12-29 PROCEDURE — 94761 N-INVAS EAR/PLS OXIMETRY MLT: CPT

## 2024-12-29 PROCEDURE — 25000003 PHARM REV CODE 250

## 2024-12-29 PROCEDURE — 99900035 HC TECH TIME PER 15 MIN (STAT)

## 2024-12-29 PROCEDURE — 80048 BASIC METABOLIC PNL TOTAL CA: CPT

## 2024-12-29 PROCEDURE — 96372 THER/PROPH/DIAG INJ SC/IM: CPT

## 2024-12-29 PROCEDURE — 36415 COLL VENOUS BLD VENIPUNCTURE: CPT

## 2024-12-29 PROCEDURE — 94640 AIRWAY INHALATION TREATMENT: CPT | Mod: XB

## 2024-12-29 PROCEDURE — 27000221 HC OXYGEN, UP TO 24 HOURS

## 2024-12-29 PROCEDURE — 63600175 PHARM REV CODE 636 W HCPCS

## 2024-12-29 PROCEDURE — 25000003 PHARM REV CODE 250: Performed by: STUDENT IN AN ORGANIZED HEALTH CARE EDUCATION/TRAINING PROGRAM

## 2024-12-29 PROCEDURE — 85025 COMPLETE CBC W/AUTO DIFF WBC: CPT

## 2024-12-29 PROCEDURE — 83735 ASSAY OF MAGNESIUM: CPT

## 2024-12-29 PROCEDURE — 25000242 PHARM REV CODE 250 ALT 637 W/ HCPCS

## 2024-12-29 PROCEDURE — 11000001 HC ACUTE MED/SURG PRIVATE ROOM

## 2024-12-29 PROCEDURE — 84100 ASSAY OF PHOSPHORUS: CPT

## 2024-12-29 RX ORDER — GUAIFENESIN AND DEXTROMETHORPHAN HYDROBROMIDE 10; 100 MG/5ML; MG/5ML
5 SYRUP ORAL EVERY 4 HOURS PRN
Status: DISCONTINUED | OUTPATIENT
Start: 2024-12-29 | End: 2024-12-30 | Stop reason: HOSPADM

## 2024-12-29 RX ORDER — POTASSIUM CHLORIDE 20 MEQ/1
40 TABLET, EXTENDED RELEASE ORAL EVERY 4 HOURS
Status: COMPLETED | OUTPATIENT
Start: 2024-12-29 | End: 2024-12-29

## 2024-12-29 RX ADMIN — IPRATROPIUM BROMIDE AND ALBUTEROL SULFATE 3 ML: 2.5; .5 SOLUTION RESPIRATORY (INHALATION) at 04:12

## 2024-12-29 RX ADMIN — IPRATROPIUM BROMIDE AND ALBUTEROL SULFATE 3 ML: 2.5; .5 SOLUTION RESPIRATORY (INHALATION) at 07:12

## 2024-12-29 RX ADMIN — OSELTAMIVIR PHOSPHATE 75 MG: 75 CAPSULE ORAL at 10:12

## 2024-12-29 RX ADMIN — LINACLOTIDE 145 MCG: 145 CAPSULE, GELATIN COATED ORAL at 10:12

## 2024-12-29 RX ADMIN — SERTRALINE HYDROCHLORIDE 100 MG: 100 TABLET ORAL at 09:12

## 2024-12-29 RX ADMIN — BENZONATATE 100 MG: 100 CAPSULE ORAL at 12:12

## 2024-12-29 RX ADMIN — NICOTINE 1 PATCH: 21 PATCH, EXTENDED RELEASE TRANSDERMAL at 10:12

## 2024-12-29 RX ADMIN — SERTRALINE HYDROCHLORIDE 100 MG: 100 TABLET ORAL at 10:12

## 2024-12-29 RX ADMIN — SUCRALFATE 1 G: 1 TABLET ORAL at 10:12

## 2024-12-29 RX ADMIN — BUTALBITAL, ACETAMINOPHEN, AND CAFFEINE 1 TABLET: 325; 50; 40 TABLET ORAL at 10:12

## 2024-12-29 RX ADMIN — SUCRALFATE 1 G: 1 TABLET ORAL at 09:12

## 2024-12-29 RX ADMIN — IPRATROPIUM BROMIDE AND ALBUTEROL SULFATE 3 ML: 2.5; .5 SOLUTION RESPIRATORY (INHALATION) at 12:12

## 2024-12-29 RX ADMIN — GABAPENTIN 100 MG: 100 CAPSULE ORAL at 09:12

## 2024-12-29 RX ADMIN — PREDNISONE 40 MG: 20 TABLET ORAL at 10:12

## 2024-12-29 RX ADMIN — ROPINIROLE HYDROCHLORIDE 0.5 MG: 0.25 TABLET, FILM COATED ORAL at 09:12

## 2024-12-29 RX ADMIN — GABAPENTIN 100 MG: 100 CAPSULE ORAL at 02:12

## 2024-12-29 RX ADMIN — ENOXAPARIN SODIUM 40 MG: 40 INJECTION SUBCUTANEOUS at 05:12

## 2024-12-29 RX ADMIN — POTASSIUM CHLORIDE 40 MEQ: 1500 TABLET, EXTENDED RELEASE ORAL at 12:12

## 2024-12-29 RX ADMIN — MESALAMINE 2.4 G: 1.2 TABLET, DELAYED RELEASE ORAL at 10:12

## 2024-12-29 RX ADMIN — POTASSIUM CHLORIDE 40 MEQ: 1500 TABLET, EXTENDED RELEASE ORAL at 05:12

## 2024-12-29 RX ADMIN — OSELTAMIVIR PHOSPHATE 75 MG: 75 CAPSULE ORAL at 09:12

## 2024-12-29 RX ADMIN — PANTOPRAZOLE SODIUM 40 MG: 40 TABLET, DELAYED RELEASE ORAL at 10:12

## 2024-12-29 RX ADMIN — BUTALBITAL, ACETAMINOPHEN, AND CAFFEINE 1 TABLET: 325; 50; 40 TABLET ORAL at 09:12

## 2024-12-29 NOTE — PLAN OF CARE
Problem: Adult Inpatient Plan of Care  Goal: Plan of Care Review  Outcome: Progressing  Goal: Patient-Specific Goal (Individualized)  Outcome: Progressing  Goal: Absence of Hospital-Acquired Illness or Injury  Outcome: Progressing  Goal: Optimal Comfort and Wellbeing  Outcome: Progressing  Goal: Readiness for Transition of Care  Outcome: Progressing     Problem: Bariatric Environmental Safety  Goal: Safety Maintained with Care  Outcome: Progressing     Problem: COPD (Chronic Obstructive Pulmonary Disease)  Goal: Optimal Chronic Illness Coping  Outcome: Progressing  Goal: Optimal Level of Functional [place]  Outcome: Progressing  Goal: Absence of Infection Signs and Symptoms  Outcome: Progressing  Goal: Improved Oral Intake  Outcome: Progressing  Goal: Effective Oxygenation and Ventilation  Outcome: Progressing     Problem: Infection  Goal: Absence of Infection Signs and Symptoms  Outcome: Progressing     Problem: Hypertension Acute  Goal: Blood Pressure Within Desired Range  Outcome: Progressing     Problem: Fluid Volume Excess  Goal: Fluid Balance  Outcome: Progressing     Problem: Fall Injury Risk  Goal: Absence of Fall and Fall-Related Injury  Outcome: Progressing     Problem: Comorbidity Management  Goal: Maintenance of COPD Symptom Control  Outcome: Progressing  Goal: Blood Pressure in Desired Range  Outcome: Progressing

## 2024-12-29 NOTE — PROGRESS NOTES
[facility] Medicine  Progress Note    Patient Name: Umu Clayton  MRN: [id number]  Patient Class: OP- Observation   Admission Date: 12/27/2024  Length of Stay: 0 days  Attending Physician: Kae Devlin MD  Primary Care Provider: Mike Mcdermott MD        Subjective     Principal Problem:Acute hypoxemic respiratory failure        HPI:  Umu Clayton a 64-year old female with PMHx of restless leg, L-hip OA, HTN, stomach ulcer, GERD, emphysema, and anxiety transferred from [facility] ER to [place] for increase SOB. Patient states she's has been SOB with exertion, coughing, not feeling well, decrease appetite, and having a HA since Tuesday. Patient gives an account of having a fever of 100.5F at home, coughing, chills, feeling tired, runny nose, and HA, but denies N/V, CP, and palpitations. She's been smoking 2-packs of cigarettes since age 17. She took all her home medication yesterday including using her albuterol inhaler with no relief.      ER Course: WBC 2.74, H/H 9.3/30.8, K 3.3, NT-proBNP 1950, trop negative, Flu A positive, Flu B negative, COVID negative. Chest xray: chronic lung disease.  No acute pulmonary finding no pneumothorax or pleural effusion.  No acute osseous finding. , EKG: A-fib with junctional pacemaker. Medication: tylenol 650mg albuterol, dexamethason 8mg, ipratropium, potassium BiCarb 50mEq. Vitals: 99.6F, 115/63 (80), 84, 18/, 94% on 2L O2.    Overview/Hospital Course:  No notes on file    Interval History:  Afebrile overnight.  Patient reports myalgias and fatigue.  Headache has improved.  Oxygen requirement has improved from 4 to 2 L via NC.  Daughter at bedside.  All questions answered.    Review of Systems  Objective:     Vital Signs (Most Recent):  Temp: 98.9 °F (37.2 °C) (12/29/24 1634)  Pulse: 106 (12/29/24 1700)  Resp: 18 (12/29/24 1634)  BP: 124/74 (12/29/24 1634)  SpO2: 98 % (12/29/24 1634) Vital Signs (24h Range):  Temp:  [97.7 °F (36.5 °C)-100.3 °F (37.9 °C)] 98.9 °F  (37.2 °C)  Pulse:  [] 106  Resp:  [18-20] 18  SpO2:  [92 %-98 %] 98 %  BP: ()/(55-80) 124/74     Weight: 123.4 kg (272 lb)  Body mass index is 42.6 kg/m².    Intake/Output Summary (Last 24 hours) at 12/29/2024 1744  Last data filed at 12/29/2024 0434  Gross per 24 hour   Intake 350 ml   Output --   Net 350 ml         Physical Exam  Vitals and nursing note reviewed.   Constitutional:       General: She is not in acute distress.     Appearance: She is obese. She is ill-appearing.   Cardiovascular:      Rate and Rhythm: Normal rate and regular rhythm.      Heart sounds: Normal heart sounds. No murmur heard.  Pulmonary:      Effort: Pulmonary effort is normal. No respiratory distress.      Breath sounds: Wheezing present.   Abdominal:      Palpations: Abdomen is soft.      Tenderness: There is no abdominal tenderness.   Musculoskeletal:      Right lower leg: No edema.      Left lower leg: No edema.   Skin:     General: Skin is warm and dry.      Findings: No bruising.   Neurological:      General: No focal deficit present.      Mental Status: She is alert and oriented to person, place, and time.   Psychiatric:         Mood and Affect: Mood normal.             Significant Labs: All pertinent labs within the past 24 hours have been reviewed.    Significant Imaging: I have reviewed all pertinent imaging results/findings within the past 24 hours.    Assessment and Plan     * Acute hypoxemic respiratory failure  Patient with Hypoxic Respiratory failure which is Acute.  she is not on home oxygen. Supplemental oxygen was provided and noted-      .   Signs/symptoms of respiratory failure include- increased work of breathing and respiratory distress. Contributing diagnoses includes - CHF, COPD, and influenza infection  Labs and images were reviewed. Patient Has not had a recent ABG. Will treat underlying causes and adjust management of respiratory failure as follows- oseltamivir, scheduled inhalers,  steroids    Influenza A  -Flu A positive  -oseltamivir 75mg pO BID        Volume overload  No prior history of documented heart failure    -BNP: 1950, troponin negative, no CP, EKG with no acute ST-T changes  -Echo shows preserved LVEF, mild diastolic dysfunction  -s/p IV diuresis        COPD exacerbation  Patient's COPD is with exacerbation noted by continued dyspnea currently.  Patient is currently on COPD Pathway. Continue scheduled inhalers Steroids and Supplemental oxygen and monitor respiratory status closely.     -nebulizer treatment q4 and PRN  -supplemental oxygen  -Prednisone 40mg       Tobacco dependence  Dangers of cigarette smoking were reviewed with patient in detail. Patient was Counseled for 3-10 minutes. Nicotine replacement options were discussed. Nicotine replacement was discussed- prescribed    Smoker  -smoking cessation encouraged  -nicotine patch        VTE Risk Mitigation (From admission, onward)           Ordered     enoxaparin injection 40 mg  Daily         12/27/24 1539     IP VTE HIGH RISK PATIENT  Once         12/27/24 1539     Place sequential compression device  Until discontinued         12/27/24 1539                    Discharge Planning   SANJANA:      Code Status: Full Code   Medical Readiness for Discharge Date:       Discharge Delays: None known at this time            Kae Devlin MD  Department of Hospital Medicine   [place] - Telemetry

## 2024-12-29 NOTE — PROGRESS NOTES
[facility] - Telemetry  Discharge Final Note    Primary Care Provider: Mike Mcdermott MD    Expected Discharge Date:     Final Discharge Note (most recent)       Final Note - 12/29/24 1128          Final Note    Assessment Type Final Discharge Note     Anticipated Discharge Disposition Home or Self Care     What phone number can be called within the next 1-3 days to see how you are doing after discharge? [phone]     Hospital Resources/Appts/Education Provided Provided patient/caregiver with written discharge plan information        Post-Acute Status    Post-Acute Authorization Other     Other Status No Post-Acute Service Needs     Discharge Delays None known at this time                     Important Message from Medicare

## 2024-12-29 NOTE — ASSESSMENT & PLAN NOTE
No prior history of documented heart failure    -BNP: 1950, troponin negative, no CP, EKG with no acute ST-T changes  -Echo shows preserved LVEF, mild diastolic dysfunction  -s/p IV diuresis

## 2024-12-29 NOTE — ASSESSMENT & PLAN NOTE
Patient's COPD is with exacerbation noted by continued dyspnea currently.  Patient is currently on COPD Pathway. Continue scheduled inhalers Steroids and Supplemental oxygen and monitor respiratory status closely.     -nebulizer treatment q4 and PRN  -supplemental oxygen  -Prednisone 40mg

## 2024-12-29 NOTE — PLAN OF CARE
12/29/24 1128   Final Note   Assessment Type Final Discharge Note   Anticipated Discharge Disposition Home   What phone number can be called within the next 1-3 days to see how you are doing after discharge? [phone]   Hospital Resources/Appts/Education Provided Provided patient/caregiver with written discharge plan information   Post-Acute Status   Post-Acute Authorization Other   Other Status No Post-Acute Service Needs   Discharge Delays None known at this time       Future Appointments   Date Time Provider Department Center   1/14/2025 10:30 AM Ankur Ford MD [facility]   1/27/2025  2:00 PM Mike Mcdermott MD [facility]    Patient is cleared to discharge home with family. Patient does not have any additional needs.

## 2024-12-29 NOTE — SUBJECTIVE & OBJECTIVE
Interval History:  Afebrile overnight.  Patient reports myalgias and fatigue.  Headache has improved.  Oxygen requirement has improved from 4 to 2 L via NC.  Daughter at bedside.  All questions answered.    Review of Systems  Objective:     Vital Signs (Most Recent):  Temp: 98.9 °F (37.2 °C) (12/29/24 1634)  Pulse: 106 (12/29/24 1700)  Resp: 18 (12/29/24 1634)  BP: 124/74 (12/29/24 1634)  SpO2: 98 % (12/29/24 1634) Vital Signs (24h Range):  Temp:  [97.7 °F (36.5 °C)-100.3 °F (37.9 °C)] 98.9 °F (37.2 °C)  Pulse:  [] 106  Resp:  [18-20] 18  SpO2:  [92 %-98 %] 98 %  BP: ()/(55-80) 124/74     Weight: 123.4 kg (272 lb)  Body mass index is 42.6 kg/m².    Intake/Output Summary (Last 24 hours) at 12/29/2024 1744  Last data filed at 12/29/2024 0434  Gross per 24 hour   Intake 350 ml   Output --   Net 350 ml         Physical Exam  Vitals and nursing note reviewed.   Constitutional:       General: She is not in acute distress.     Appearance: She is obese. She is ill-appearing.   Cardiovascular:      Rate and Rhythm: Normal rate and regular rhythm.      Heart sounds: Normal heart sounds. No murmur heard.  Pulmonary:      Effort: Pulmonary effort is normal. No respiratory distress.      Breath sounds: Wheezing present.   Abdominal:      Palpations: Abdomen is soft.      Tenderness: There is no abdominal tenderness.   Musculoskeletal:      Right lower leg: No edema.      Left lower leg: No edema.   Skin:     General: Skin is warm and dry.      Findings: No bruising.   Neurological:      General: No focal deficit present.      Mental Status: She is alert and oriented to person, place, and time.   Psychiatric:         Mood and Affect: Mood normal.             Significant Labs: All pertinent labs within the past 24 hours have been reviewed.    Significant Imaging: I have reviewed all pertinent imaging results/findings within the past 24 hours.

## 2024-12-30 VITALS
SYSTOLIC BLOOD PRESSURE: 126 MMHG | HEART RATE: 77 BPM | RESPIRATION RATE: 19 BRPM | BODY MASS INDEX: 42.69 KG/M2 | OXYGEN SATURATION: 96 % | HEIGHT: 67 IN | DIASTOLIC BLOOD PRESSURE: 79 MMHG | TEMPERATURE: 99 F | WEIGHT: 272 LBS

## 2024-12-30 LAB
ANION GAP SERPL CALC-SCNC: 7 MMOL/L (ref 8–16)
BASOPHILS # BLD AUTO: 0 K/UL (ref 0–0.2)
BASOPHILS NFR BLD: 0 % (ref 0–1.9)
BUN SERPL-MCNC: 19 MG/DL (ref 8–23)
CALCIUM SERPL-MCNC: 8.6 MG/DL (ref 8.7–10.5)
CHLORIDE SERPL-SCNC: 101 MMOL/L (ref 95–110)
CO2 SERPL-SCNC: 32 MMOL/L (ref 23–29)
CREAT SERPL-MCNC: 0.7 MG/DL (ref 0.5–1.4)
DIFFERENTIAL METHOD BLD: ABNORMAL
EOSINOPHIL # BLD AUTO: 0 K/UL (ref 0–0.5)
EOSINOPHIL NFR BLD: 0 % (ref 0–8)
ERYTHROCYTE [DISTWIDTH] IN BLOOD BY AUTOMATED COUNT: 14.7 % (ref 11.5–14.5)
EST. GFR  (NO RACE VARIABLE): >60 ML/MIN/1.73 M^2
GLUCOSE SERPL-MCNC: 97 MG/DL (ref 70–110)
HCT VFR BLD AUTO: 29.6 % (ref 37–48.5)
HGB BLD-MCNC: 8.7 G/DL (ref 12–16)
IMM GRANULOCYTES # BLD AUTO: 0.01 K/UL (ref 0–0.04)
IMM GRANULOCYTES NFR BLD AUTO: 0.3 % (ref 0–0.5)
LYMPHOCYTES # BLD AUTO: 0.6 K/UL (ref 1–4.8)
LYMPHOCYTES NFR BLD: 20.4 % (ref 18–48)
MCH RBC QN AUTO: 25.4 PG (ref 27–31)
MCHC RBC AUTO-ENTMCNC: 29.4 G/DL (ref 32–36)
MCV RBC AUTO: 87 FL (ref 82–98)
MONOCYTES # BLD AUTO: 0.4 K/UL (ref 0.3–1)
MONOCYTES NFR BLD: 14.3 % (ref 4–15)
NEUTROPHILS # BLD AUTO: 1.9 K/UL (ref 1.8–7.7)
NEUTROPHILS NFR BLD: 65 % (ref 38–73)
NRBC BLD-RTO: 0 /100 WBC
PLATELET # BLD AUTO: 155 K/UL (ref 150–450)
PMV BLD AUTO: 11 FL (ref 9.2–12.9)
POTASSIUM SERPL-SCNC: 3.9 MMOL/L (ref 3.5–5.1)
RBC # BLD AUTO: 3.42 M/UL (ref 4–5.4)
SODIUM SERPL-SCNC: 140 MMOL/L (ref 136–145)
WBC # BLD AUTO: 2.94 K/UL (ref 3.9–12.7)

## 2024-12-30 PROCEDURE — 94761 N-INVAS EAR/PLS OXIMETRY MLT: CPT

## 2024-12-30 PROCEDURE — 27000221 HC OXYGEN, UP TO 24 HOURS

## 2024-12-30 PROCEDURE — S4991 NICOTINE PATCH NONLEGEND: HCPCS

## 2024-12-30 PROCEDURE — 94640 AIRWAY INHALATION TREATMENT: CPT

## 2024-12-30 PROCEDURE — 63600175 PHARM REV CODE 636 W HCPCS

## 2024-12-30 PROCEDURE — 85025 COMPLETE CBC W/AUTO DIFF WBC: CPT

## 2024-12-30 PROCEDURE — 25000003 PHARM REV CODE 250

## 2024-12-30 PROCEDURE — 99900035 HC TECH TIME PER 15 MIN (STAT)

## 2024-12-30 PROCEDURE — 36415 COLL VENOUS BLD VENIPUNCTURE: CPT

## 2024-12-30 PROCEDURE — 25000242 PHARM REV CODE 250 ALT 637 W/ HCPCS

## 2024-12-30 PROCEDURE — 80048 BASIC METABOLIC PNL TOTAL CA: CPT

## 2024-12-30 PROCEDURE — 25000003 PHARM REV CODE 250: Performed by: STUDENT IN AN ORGANIZED HEALTH CARE EDUCATION/TRAINING PROGRAM

## 2024-12-30 RX ORDER — IPRATROPIUM BROMIDE AND ALBUTEROL SULFATE 2.5; .5 MG/3ML; MG/3ML
3 SOLUTION RESPIRATORY (INHALATION) EVERY 6 HOURS PRN
Qty: 270 ML | Refills: 2 | Status: SHIPPED | OUTPATIENT
Start: 2024-12-30 | End: 2025-03-30

## 2024-12-30 RX ORDER — OSELTAMIVIR PHOSPHATE 75 MG/1
75 CAPSULE ORAL 2 TIMES DAILY
Qty: 2 CAPSULE | Refills: 0 | Status: SHIPPED | OUTPATIENT
Start: 2024-12-30 | End: 2024-12-31

## 2024-12-30 RX ORDER — LISINOPRIL AND HYDROCHLOROTHIAZIDE 12.5; 2 MG/1; MG/1
TABLET ORAL
Start: 2024-12-30

## 2024-12-30 RX ORDER — IBUPROFEN 200 MG
1 TABLET ORAL DAILY
Qty: 30 PATCH | Refills: 2 | Status: SHIPPED | OUTPATIENT
Start: 2024-12-30

## 2024-12-30 RX ORDER — PREDNISONE 20 MG/1
40 TABLET ORAL DAILY
Qty: 2 TABLET | Refills: 0 | Status: SHIPPED | OUTPATIENT
Start: 2024-12-31 | End: 2025-01-01

## 2024-12-30 RX ADMIN — OSELTAMIVIR PHOSPHATE 75 MG: 75 CAPSULE ORAL at 08:12

## 2024-12-30 RX ADMIN — SERTRALINE HYDROCHLORIDE 100 MG: 100 TABLET ORAL at 08:12

## 2024-12-30 RX ADMIN — SUCRALFATE 1 G: 1 TABLET ORAL at 08:12

## 2024-12-30 RX ADMIN — MESALAMINE 2.4 G: 1.2 TABLET, DELAYED RELEASE ORAL at 08:12

## 2024-12-30 RX ADMIN — PREDNISONE 40 MG: 20 TABLET ORAL at 08:12

## 2024-12-30 RX ADMIN — IPRATROPIUM BROMIDE AND ALBUTEROL SULFATE 3 ML: 2.5; .5 SOLUTION RESPIRATORY (INHALATION) at 11:12

## 2024-12-30 RX ADMIN — NICOTINE 1 PATCH: 21 PATCH, EXTENDED RELEASE TRANSDERMAL at 08:12

## 2024-12-30 RX ADMIN — PANTOPRAZOLE SODIUM 40 MG: 40 TABLET, DELAYED RELEASE ORAL at 08:12

## 2024-12-30 RX ADMIN — GABAPENTIN 100 MG: 100 CAPSULE ORAL at 08:12

## 2024-12-30 RX ADMIN — LINACLOTIDE 145 MCG: 145 CAPSULE, GELATIN COATED ORAL at 08:12

## 2024-12-30 NOTE — PLAN OF CARE
SOCIAL WORK DISCHARGE PLANNING ASSESSMENT    SW completed discharge planning assessment. Pt lives with her  Pankaj. Pt has no DME and is not current with  services. Pt drives herself to doctor appointments and Pankaj will provide transportation home following discharge.     Future Appointments   Date Time Provider Department Center   1/14/2025 10:30 AM Ankur Ford MD [facility] [address]   1/27/2025  2:00 PM Mike Mcdermott MD [facility]        Patient Active Problem List   Diagnosis    Anxiety    Hypertension    Smoker    Centrilobular emphysema    Restless leg syndrome    Tobacco dependence    Ulcerative colitis    Class 3 obesity with alveolar hypoventilation, serious comorbidity, and body mass index (BMI) of 45.0 to 49.9 in adult    Osteoarthritis of hip    COPD exacerbation    Volume overload    Influenza A    Acute hypoxemic respiratory failure      12/30/24 0956   Discharge Planning   Assessment Type Discharge Planning Brief Assessment   Resource/Environmental Concerns none   Support Systems Spouse/significant other  (Pt's  Pankaj [phone])   Equipment Currently Used at Home none   Current Living Arrangements home   Patient/Family Anticipates Transition to home;home with family   Patient/Family Anticipated Services at Transition none   DME Needed Upon Discharge  none   Discharge Plan A Home with family   Physical Activity   On average, how many days per week do you engage in moderate to strenuous exercise (like a brisk walk)? Pt Unable   On average, how many minutes do you engage in exercise at this level? Pt Unable   Financial Resource Strain   How hard is it for you to pay for the very basics like food, housing, medical care, and heating? Pt Unable   Housing Stability   In the last 12 months, was there a time when you were not able to pay the mortgage or rent on time? Pt Unable   At any time in the past 12 months, were you homeless or living in a shelter  (including now)? Pt Unable   Transportation Needs   Has the lack of transportation kept you from medical appointments, meetings, work or from getting things needed for daily living? Patient unable to answer   Food Insecurity   Within the past 12 months, you worried that your food would run out before you got the money to buy more. Pt Unable   Within the past 12 months, the food you bought just didn't last and you didn't have money to get more. Pt Unable   Stress   Do you feel stress - tense, restless, nervous, or anxious, or unable to sleep at night because your mind is troubled all the time - these days? Pt Unable   Social Isolation   How often do you feel lonely or isolated from those around you?  Patient unable to answer   Alcohol Use   Q1: How often do you have a drink containing alcohol? Pt Unable   Q2: How many drinks containing alcohol do you have on a typical day when you are drinking? Pt Unable   Q3: How often do you have six or more drinks on one occasion? Pt Unable   Utilities   In the past 12 months has the electric, gas, oil, or water company threatened to shut off services in your home? Pt Unable   Health Literacy   How often do you need to have someone help you when you read instructions, pamphlets, or other written material from your doctor or pharmacy? Patient unable to respond

## 2024-12-30 NOTE — PLAN OF CARE
D/c orders noted, no DME, no HH.     Pt's  Pankaj ([phone]) will provide transportation home following discharge.     Pt is cleared to go from CM standpoint.     Future Appointments   Date Time Provider Department Center   1/14/2025 10:30 AM Ankur Ford MD [facility] [address]   1/27/2025  2:00 PM Mike Mcdermott MD [facility]         12/30/24 0937   Final Note   Assessment Type Final Discharge Note   Anticipated Discharge Disposition Home   What phone number can be called within the next 1-3 days to see how you are doing after discharge? [phone]   Post-Acute Status   Discharge Delays None known at this time

## 2024-12-30 NOTE — NURSING
Patient discharging home with family. All discharge instructions reviewed with VN. IV and Telemetry removed, patient tolerated well.

## 2024-12-30 NOTE — PLAN OF CARE
[facility] - Telemetry      HOME HEALTH ORDERS  FACE TO FACE ENCOUNTER    Patient Name: Umu Clayton  YOB: 1960    PCP: Mike Mcdermott MD   PCP Address: [address] / [place] [address]  PCP Phone Number: [phone]  PCP Fax: [phone]    Encounter Date: 12/27/24    Admit to Home Health    Diagnoses:  Active Hospital Problems    Diagnosis  POA    *Acute hypoxemic respiratory failure [J96.01]  Yes    COPD exacerbation [J44.1]  Yes    Volume overload [E87.70]  Yes    Influenza A [J10.1]  Yes    Tobacco dependence [F17.200]  Yes     -Continue journey to quitting cigarettes. Don't give up!   -Patient is enrolled in the smoking cessation program.   -Continue annual LDCT. Next one is due 12/2024.        Resolved Hospital Problems   No resolved problems to display.       Follow Up Appointments:  Future Appointments   Date Time Provider Department Center   1/14/2025 10:30 AM Ankur Ford MD [facility]   1/27/2025  2:00 PM Mike Mcdermott MD [facility]       Allergies:  Review of patient's allergies indicates:   Allergen Reactions    Penicillins Hives       Medications: Review discharge medications with patient and family and provide education.    Current Facility-Administered Medications   Medication Dose Route Frequency Provider Last Rate Last Admin    acetaminophen tablet 650 mg  650 mg Oral Q6H PRN Ramon Davenport MD   650 mg at 12/28/24 2015    albuterol-ipratropium 2.5 mg-0.5 mg/3 mL nebulizer solution 3 mL  3 mL Nebulization Q4H Kerry Menard DNP   3 mL at 12/30/24 1155    baclofen tablet 10 mg  10 mg Oral Nightly PRN Kerry Menard DNP        benzonatate capsule 100 mg  100 mg Oral TID PRN Geovanni Katz MD   100 mg at 12/29/24 1232    butalbital-acetaminophen-caffeine -40 mg per tablet 1 tablet  1 tablet Oral Q6H PRN Kae Devlin MD   1 tablet at 12/29/24 2131    dextromethorphan-guaiFENesin  mg/5 ml liquid 5 mL  5 mL  Oral Q4H PRN Kae Devlin MD        enoxaparin injection 40 mg  40 mg Subcutaneous Daily Kerry Menard DNP   40 mg at 12/29/24 1730    gabapentin capsule 100 mg  100 mg Oral TID Kerry Menard DNP   100 mg at 12/30/24 0830    linaCLOtide capsule 145 mcg  145 mcg Oral Daily Kerry Menard DNP   145 mcg at 12/30/24 0830    melatonin tablet 6 mg  6 mg Oral Nightly PRN Kerry Menard DNP        mesalamine (LIALDA) EC tablet 1.2 g  2.4 g Oral Daily with breakfast Kerry Menard DNP   2.4 g at 12/30/24 0830    nicotine 21 mg/24 hr 1 patch  1 patch Transdermal Daily Kerry Menard DNP   1 patch at 12/30/24 0830    ondansetron disintegrating tablet 8 mg  8 mg Oral Q8H PRN Kerry Menard DNP   8 mg at 12/28/24 2015    ondansetron injection 4 mg  4 mg Intravenous Q12H PRN Kerry Menard DNP        oseltamivir capsule 75 mg  75 mg Oral BID Kerry Menard DNP   75 mg at 12/30/24 0830    pantoprazole EC tablet 40 mg  40 mg Oral Daily Kerry Menard DNP   40 mg at 12/30/24 0829    polyethylene glycol packet 17 g  17 g Oral Daily Kerry Menard DNP        predniSONE tablet 40 mg  40 mg Oral Daily Kerry Menard DNP   40 mg at 12/30/24 0830    rOPINIRole tablet 0.5 mg  0.5 mg Oral QHS Kerry Menard DNP   0.5 mg at 12/29/24 2131    sertraline tablet 100 mg  100 mg Oral BID Kae Devlin MD   100 mg at 12/30/24 0830    sodium chloride 0.9% flush 3 mL  3 mL Intravenous PRN Kerry Menard DNP        sucralfate tablet 1 g  1 g Oral BID Kerry Menard DNP   1 g at 12/30/24 0830        Medication List        START taking these medications      nicotine 21 mg/24 hr  Commonly known as: NICODERM CQ  Place 1 patch onto the skin once daily.     oseltamivir 75 MG capsule  Commonly known as: TAMIFLU  Take 1 capsule (75 mg total) by mouth 2 (two) times daily. for 1 day     predniSONE 20 MG tablet  Commonly known as: DELTASONE  Take 2 tablets (40 mg total) by mouth once daily. for 1 day  Start taking on:  December 31, 2024            CHANGE how you take these medications      lisinopriL-hydrochlorothiazide 20-12.5 mg per tablet  Commonly known as: PRINZIDE,ZESTORETIC  Hold until BP>130/90. When you start, take 2 tab daily  What changed:   how much to take  how to take this  when to take this  additional instructions            CONTINUE taking these medications      albuterol-ipratropium 2.5 mg-0.5 mg/3 mL nebulizer solution  Commonly known as: DUO-NEB  Take 3 mLs by nebulization every 6 (six) hours as needed for Wheezing or Shortness of Breath. Rescue     baclofen 10 MG tablet  Commonly known as: LIORESAL  Take 1 tablet (10 mg total) by mouth nightly as needed (msucle pain).     fluticasone propionate 50 mcg/actuation nasal spray  Commonly known as: FLONASE  1 spray by Each Nostril route 2 (two) times daily.     furosemide 40 MG tablet  Commonly known as: LASIX  TAKE 1 TABLET DAILY     gabapentin 100 MG capsule  Commonly known as: NEURONTIN  Take 1 capsule (100 mg total) by mouth 3 (three) times daily.     LINZESS 145 mcg Cap capsule  Generic drug: linaCLOtide  Take 145 mcg by mouth once daily.     mesalamine 1.2 gram Tbec  Commonly known as: LIALDA  Take 2.4 g by mouth daily with breakfast. 2 tablets     ondansetron 4 MG Tbdl  Commonly known as: ZOFRAN-ODT  Take 1 tablet (4 mg total) by mouth every 8 (eight) hours as needed (nausea and vomiting).     RABEprazole 20 mg tablet  Commonly known as: ACIPHEX  Take 20 mg by mouth once daily.     rOPINIRole 0.5 MG tablet  Commonly known as: REQUIP  TAKE 1 TABLET EVERY EVENING     sertraline 100 MG tablet  Commonly known as: ZOLOFT  TAKE 2 TABLETS ONCE DAILY     sucralfate 1 gram tablet  Commonly known as: CARAFATE  Take 1 g by mouth 2 (two) times daily.     traMADoL 50 mg tablet  Commonly known as: ULTRAM  Take 50 mg by mouth as needed for Pain.     TRELEGY ELLIPTA 200-62.5-25 mcg inhaler  Generic drug: fluticasone-umeclidin-vilanter  Inhale 1 puff into the lungs once  daily.            STOP taking these medications      ibuprofen 800 MG tablet  Commonly known as: ADVILMOTRIN            ASK your doctor about these medications      ELIQUIS 2.5 mg Tab  Generic drug: apixaban  Take 2.5 mg by mouth 2 (two) times daily.     ergocalciferol 50,000 unit Cap  Commonly known as: ERGOCALCIFEROL  Vitamin D2 1,250 mcg (50,000 unit) capsule   Take 1 capsule every week by oral route.     folic acid 1 MG tablet  Commonly known as: FOLVITE  Take 1 tablet (1 mg total) by mouth once daily.     MS CONTIN 15 mg 12 hr tablet  Generic drug: morphine  Take 15 mg by mouth every 12 (twelve) hours as needed.     oxyCODONE-acetaminophen  mg per tablet  Commonly known as: PERCOCET  Take by mouth.                I have seen and examined this patient within the last 30 days. My clinical findings that support the need for the home health skilled services and home bound status are the following:no   Weakness/numbness causing balance and gait disturbance due to Infection and Weakness/Debility making it taxing to leave home.  Requiring assistive device to leave home due to unsteady gait caused by  Infection and Weakness/Debility.     Diet:   cardiac diet      Referrals/ Consults  Physical Therapy to evaluate and treat. Evaluate for home safety and equipment needs; Establish/upgrade home exercise program. Perform / instruct on therapeutic exercises, gait training, transfer training, and Range of Motion.  Occupational Therapy to evaluate and treat. Evaluate home environment for safety and equipment needs. Perform/Instruct on transfers, ADL training, ROM, and therapeutic exercises.    Activities:   activity as tolerated    Nursing:   Agency to admit patient within 24 hours of hospital discharge unless specified on physician order or at patient request    SN to complete comprehensive assessment including routine vital signs. Instruct on disease process and s/s of complications to report to MD. Review/verify  medication list sent home with the patient at time of discharge  and instruct patient/caregiver as needed. Frequency may be adjusted depending on start of care date.     Skilled nurse to perform up to 3 visits PRN for symptoms related to diagnosis    Notify MD if SBP > 160 or < 90; DBP > 90 or < 50; HR > 120 or < 50; Temp > 101; O2 < 88%    Ok to schedule additional visits based on staff availability and patient request on consecutive days within the home health episode.    When multiple disciplines ordered:    Start of Care occurs on Sunday - Wednesday schedule remaining discipline evaluations as ordered on separate consecutive days following the start of care.    Thursday SOC -schedule subsequent evaluations Friday and Monday the following week.     Friday - Saturday SOC - schedule subsequent discipline evaluations on consecutive days starting Monday of the following week.    For all post-discharge communication and subsequent orders please contact patient's primary care physician.    Miscellaneous   Home Oxygen:  Oxygen at 2 L/min nasal canula to be used:  Continuously.    Home Health Aide:  Nursing Twice weekly, Physical Therapy Twice weekly, and Occupational Therapy Twice weekly    Wound Care Orders  no    I certify that this patient is confined to her home and needs intermittent skilled nursing care, physical therapy, and occupational therapy.

## 2024-12-30 NOTE — PROGRESS NOTES
Discharge orders noted. Additional clinical references attached. Patient's discharge instructions given by bedside RN. Virtual nurse cued into room and reviewed discharge instructions. Education provided on new medication, diagnosis, and follow-up appointments. Teach back method used. Patient verbalized understanding. All questions answered. Awaiting pharmacy for bedside medication delivery. Bedside nurse updated on patient status and to request transportation when ready.     12/30/24 1409   AVS Confirmation   Discharge instructions and AVS provided to and reviewed with patient and/or significant other. Yes

## 2024-12-31 NOTE — HOSPITAL COURSE
"64-year old female with PMHx of restless leg, L-hip OA, HTN, stomach ulcer, GERD, emphysema, current tobacco abuse and anxiety admitted with worsening dyspnea, cough, headache found to have acute hypoxic respiratory failure secondary to influenza a infection.  Symptoms improved with supportive care-scheduled DuoNebs, prednisone 40 mg daily, Tamiflu.  Also noted to have elevated BNP for which she was diuresed with IV Lasix.  Echocardiogram showed grade 1 diastolic dysfunction.  Electrolytes replaced.  Patient remained on 2 L oxygen via nasal cannula.  Patient reported having oxygen at home which she uses on an as-needed basis.  She feels well and is requesting to be discharged.  Vital signs are stable.  Lab work grossly unremarkable.  Leukopenia thought to be in setting of viral infection.  Patient okay for discharge with outpatient follow up.  Emphasized importance of smoking cessation.    /79 (BP Location: Left arm, Patient Position: Lying)   Pulse 77   Temp 98.6 °F (37 °C) (Oral)   Resp 19   Ht 5' 7" (1.702 m)   Wt 123.4 kg (272 lb)   SpO2 96%   Breastfeeding No   BMI 42.60 kg/m²     Physical Exam  Vitals and nursing note reviewed.   Constitutional:       General: She is not in acute distress.     Appearance: She is obese.   Cardiovascular:      Rate and Rhythm: Normal rate and regular rhythm.      Heart sounds: Normal heart sounds. No murmur heard.  Pulmonary:      Effort: Pulmonary effort is normal. No respiratory distress.      Breath sounds:  Wheezing much improved   Abdominal:      Palpations: Abdomen is soft.      Tenderness: There is no abdominal tenderness.   Musculoskeletal:      Right lower leg: No edema.      Left lower leg: No edema.   Skin:     General: Skin is warm and dry.      Findings: No bruising.   Neurological:      General: No focal deficit present.      Mental Status: She is alert and oriented to person, place, and time.   Psychiatric:         Mood and Affect: Mood normal.   "

## 2024-12-31 NOTE — DISCHARGE SUMMARY
[facility] Medicine  Discharge Summary      Patient Name: Umu Clayton  MRN: [id number]  RAFAEL: [id number]  Patient Class: IP- Inpatient  Admission Date: 12/27/2024  Hospital Length of Stay: 1 days  Discharge Date and Time: 12/30/2024  2:41 PM  Attending Physician: No att. providers found   Discharging Provider: Kae Devlin MD  Primary Care Provider: Mike Mcdermott MD    Primary Care Team: Networked reference to record PCT     HPI:   Umu Clayton a 64-year old female with PMHx of restless leg, L-hip OA, HTN, stomach ulcer, GERD, emphysema, and anxiety transferred from [facility] ER to [place] for increase SOB. Patient states she's has been SOB with exertion, coughing, not feeling well, decrease appetite, and having a HA since Tuesday. Patient gives an account of having a fever of 100.5F at home, coughing, chills, feeling tired, runny nose, and HA, but denies N/V, CP, and palpitations. She's been smoking 2-packs of cigarettes since age 17. She took all her home medication yesterday including using her albuterol inhaler with no relief.      ER Course: WBC 2.74, H/H 9.3/30.8, K 3.3, NT-proBNP 1950, trop negative, Flu A positive, Flu B negative, COVID negative. Chest xray: chronic lung disease.  No acute pulmonary finding no pneumothorax or pleural effusion.  No acute osseous finding. , EKG: A-fib with junctional pacemaker. Medication: tylenol 650mg albuterol, dexamethason 8mg, ipratropium, potassium BiCarb 50mEq. Vitals: 99.6F, 115/63 (80), 84, 18/, 94% on 2L O2.    * No surgery found *      Hospital Course:   64-year old female with PMHx of restless leg, L-hip OA, HTN, stomach ulcer, GERD, emphysema, current tobacco abuse and anxiety admitted with worsening dyspnea, cough, headache found to have acute hypoxic respiratory failure secondary to influenza a infection.  Symptoms improved with supportive care-scheduled DuoNebs, prednisone 40 mg daily, Tamiflu.  Also noted to have elevated BNP for which she  "was diuresed with IV Lasix.  Echocardiogram showed grade 1 diastolic dysfunction.  Electrolytes replaced.  Patient remained on 2 L oxygen via nasal cannula.  Patient reported having oxygen at home which she uses on an as-needed basis.  She feels well and is requesting to be discharged.  Vital signs are stable.  Lab work grossly unremarkable.  Leukopenia thought to be in setting of viral infection.  Patient okay for discharge with outpatient follow up.  Emphasized importance of smoking cessation.    /79 (BP Location: Left arm, Patient Position: Lying)   Pulse 77   Temp 98.6 °F (37 °C) (Oral)   Resp 19   Ht 5' 7" (1.702 m)   Wt 123.4 kg (272 lb)   SpO2 96%   Breastfeeding No   BMI 42.60 kg/m²     Physical Exam  Vitals and nursing note reviewed.   Constitutional:       General: She is not in acute distress.     Appearance: She is obese.   Cardiovascular:      Rate and Rhythm: Normal rate and regular rhythm.      Heart sounds: Normal heart sounds. No murmur heard.  Pulmonary:      Effort: Pulmonary effort is normal. No respiratory distress.      Breath sounds:  Wheezing much improved   Abdominal:      Palpations: Abdomen is soft.      Tenderness: There is no abdominal tenderness.   Musculoskeletal:      Right lower leg: No edema.      Left lower leg: No edema.   Skin:     General: Skin is warm and dry.      Findings: No bruising.   Neurological:      General: No focal deficit present.      Mental Status: She is alert and oriented to person, place, and time.   Psychiatric:         Mood and Affect: Mood normal.      Goals of Care Treatment Preferences:  Code Status: Full Code      SDOH Screening:  The patient was unable to be screened for utility difficulties, food insecurity, transport difficulties, housing insecurity, and interpersonal safety, so no concerns could be identified this admission.     Consults:       Final Active Diagnoses:    Diagnosis Date Noted POA    PRINCIPAL PROBLEM:  Acute hypoxemic " respiratory failure [J96.01] 12/28/2024 Yes    COPD exacerbation [J44.1] 12/27/2024 Yes    Volume overload [E87.70] 12/27/2024 Yes    Influenza A [J10.1] 12/27/2024 Yes    Tobacco dependence [F17.200] 12/28/2023 Yes      Problems Resolved During this Admission:       Discharged Condition: stable    Disposition: Home or Self Care    Follow Up:    Patient Instructions:      Ambulatory referral/consult to Smoking Cessation Program   Standing Status: Future   Referral Priority: Routine Referral Type: Consultation   Referral Reason: Specialty Services Required   Requested Specialty: CTTS   Number of Visits Requested: 1     Ambulatory referral/consult to Pulmonology   Standing Status: Future   Referral Priority: Routine Referral Type: Consultation   Referral Reason: Specialty Services Required   Requested Specialty: Pulmonary Disease   Number of Visits Requested: 1       Significant Diagnostic Studies: Labs: BMP:   Recent Labs   Lab 12/29/24  0404 12/30/24  0344    97    140   K 3.3* 3.9    101   CO2 31* 32*   BUN 21 19   CREATININE 0.7 0.7   CALCIUM 8.5* 8.6*   MG 2.1  --     and CBC   Recent Labs   Lab 12/29/24 0404 12/30/24  0344   WBC 3.10* 2.94*   HGB 8.4* 8.7*   HCT 28.0* 29.6*    155       Pending Diagnostic Studies:       None           Medications:  Reconciled Home Medications:      Medication List        START taking these medications      nicotine 21 mg/24 hr  Commonly known as: NICODERM CQ  Place 1 patch onto the skin once daily.     oseltamivir 75 MG capsule  Commonly known as: TAMIFLU  Take 1 capsule (75 mg total) by mouth 2 (two) times daily. for 1 day     predniSONE 20 MG tablet  Commonly known as: DELTASONE  Take 2 tablets (40 mg total) by mouth once daily. for 1 day  Start taking on: December 31, 2024            CHANGE how you take these medications      lisinopriL-hydrochlorothiazide 20-12.5 mg per tablet  Commonly known as: PRINZIDE,ZESTORETIC  Hold until BP>130/90. When you  start, take 2 tab daily  What changed:   how much to take  how to take this  when to take this  additional instructions            CONTINUE taking these medications      albuterol-ipratropium 2.5 mg-0.5 mg/3 mL nebulizer solution  Commonly known as: DUO-NEB  Take 3 mLs by nebulization every 6 (six) hours as needed for Wheezing or Shortness of Breath. Rescue     baclofen 10 MG tablet  Commonly known as: LIORESAL  Take 1 tablet (10 mg total) by mouth nightly as needed (msucle pain).     fluticasone propionate 50 mcg/actuation nasal spray  Commonly known as: FLONASE  1 spray by Each Nostril route 2 (two) times daily.     furosemide 40 MG tablet  Commonly known as: LASIX  TAKE 1 TABLET DAILY     gabapentin 100 MG capsule  Commonly known as: NEURONTIN  Take 1 capsule (100 mg total) by mouth 3 (three) times daily.     LINZESS 145 mcg Cap capsule  Generic drug: linaCLOtide  Take 145 mcg by mouth once daily.     mesalamine 1.2 gram Tbec  Commonly known as: LIALDA  Take 2.4 g by mouth daily with breakfast. 2 tablets     ondansetron 4 MG Tbdl  Commonly known as: ZOFRAN-ODT  Take 1 tablet (4 mg total) by mouth every 8 (eight) hours as needed (nausea and vomiting).     RABEprazole 20 mg tablet  Commonly known as: ACIPHEX  Take 20 mg by mouth once daily.     rOPINIRole 0.5 MG tablet  Commonly known as: REQUIP  TAKE 1 TABLET EVERY EVENING     sertraline 100 MG tablet  Commonly known as: ZOLOFT  TAKE 2 TABLETS ONCE DAILY     sucralfate 1 gram tablet  Commonly known as: CARAFATE  Take 1 g by mouth 2 (two) times daily.     traMADoL 50 mg tablet  Commonly known as: ULTRAM  Take 50 mg by mouth as needed for Pain.     TRELEGY ELLIPTA 200-62.5-25 mcg inhaler  Generic drug: fluticasone-umeclidin-vilanter  Inhale 1 puff into the lungs once daily.            STOP taking these medications      ibuprofen 800 MG tablet  Commonly known as: ADVRUFINO TELLESRIN            ASK your doctor about these medications      ELIQUIS 2.5 mg Tab  Generic drug:  apixaban  Take 2.5 mg by mouth 2 (two) times daily.     ergocalciferol 50,000 unit Cap  Commonly known as: ERGOCALCIFEROL  Vitamin D2 1,250 mcg (50,000 unit) capsule   Take 1 capsule every week by oral route.     folic acid 1 MG tablet  Commonly known as: FOLVITE  Take 1 tablet (1 mg total) by mouth once daily.     MS CONTIN 15 mg 12 hr tablet  Generic drug: morphine  Take 15 mg by mouth every 12 (twelve) hours as needed.     oxyCODONE-acetaminophen  mg per tablet  Commonly known as: PERCOCET  Take by mouth.              Indwelling Lines/Drains at time of discharge:   Lines/Drains/Airways       None                   Time spent on the discharge of patient: 38 minutes         Kae Devlin MD  Department of Hospital Medicine  [facility]

## 2025-01-13 ENCOUNTER — TELEPHONE (OUTPATIENT)
Dept: PULMONOLOGY | Facility: CLINIC | Age: 65
End: 2025-01-13
Payer: COMMERCIAL

## 2025-01-13 NOTE — TELEPHONE ENCOUNTER
----- Message from Tech Jasimine sent at 1/13/2025  4:11 PM CST -----  Regarding: Reschedule appt  Contact: [phone]  Type:  Sooner Apoointment Request    Caller is requesting a sooner appointment.  Caller declined first available appointment listed below.  Caller will not be placed on the wait list since it not an option.      Name of Caller:the pt is unable to come in since she has the Flu  When is the first available appointment? In April   Symptoms:COPD and the pt has the flu  Would the patient rather a call back or a response via [facility]? call  Best Call Back Number:[phone]

## 2025-01-13 NOTE — TELEPHONE ENCOUNTER
Call was returned to patient in regards to rescheduling her appointment. Patient is scheduled with Dr Ford first available appointment on 4/8/25 at 10 am. Patient is added to the wait list. Patient was advised to reach out to her PCP as well. Patient confirmed and verbalized understanding.

## 2025-02-23 NOTE — TELEPHONE ENCOUNTER
Called pt to schedule injection with Dr Eagle, no answer left vm for pt to call office back to schedule injection.    .Colette PENA     149.9

## 2025-03-31 ENCOUNTER — TELEPHONE (OUTPATIENT)
Dept: PULMONOLOGY | Facility: CLINIC | Age: 65
End: 2025-03-31
Payer: COMMERCIAL

## 2025-03-31 NOTE — TELEPHONE ENCOUNTER
Attempted to contact patient to confirm scheduled  follow-up appointment 04/08/25@10:00am with Dr Ford. Message left for the patient to call the office to confirm.

## 2025-04-09 ENCOUNTER — TELEPHONE (OUTPATIENT)
Dept: PULMONOLOGY | Facility: CLINIC | Age: 65
End: 2025-04-09
Payer: COMMERCIAL

## 2025-04-09 NOTE — TELEPHONE ENCOUNTER
Attempted to contact patient regarding rescheduling follow-up appointment with Dr Ford. No answer, message left for the patient to call the office.

## 2025-05-19 ENCOUNTER — OFFICE VISIT (OUTPATIENT)
Dept: FAMILY MEDICINE | Facility: CLINIC | Age: 65
End: 2025-05-19
Payer: COMMERCIAL

## 2025-05-19 VITALS
HEART RATE: 80 BPM | DIASTOLIC BLOOD PRESSURE: 84 MMHG | SYSTOLIC BLOOD PRESSURE: 130 MMHG | OXYGEN SATURATION: 93 % | HEIGHT: 67 IN | BODY MASS INDEX: 43.25 KG/M2 | TEMPERATURE: 98 F | WEIGHT: 275.56 LBS

## 2025-05-19 DIAGNOSIS — J44.9 CHRONIC OBSTRUCTIVE PULMONARY DISEASE, UNSPECIFIED COPD TYPE: ICD-10-CM

## 2025-05-19 DIAGNOSIS — F41.9 ANXIETY: ICD-10-CM

## 2025-05-19 DIAGNOSIS — E53.8 FOLATE DEFICIENCY: ICD-10-CM

## 2025-05-19 DIAGNOSIS — D64.9 ANEMIA, UNSPECIFIED TYPE: ICD-10-CM

## 2025-05-19 DIAGNOSIS — J98.4 RESTRICTIVE LUNG DISEASE: ICD-10-CM

## 2025-05-19 DIAGNOSIS — R79.89 LOW VITAMIN B12 LEVEL: ICD-10-CM

## 2025-05-19 DIAGNOSIS — Z00.00 ROUTINE GENERAL MEDICAL EXAMINATION AT A HEALTH CARE FACILITY: Primary | ICD-10-CM

## 2025-05-19 DIAGNOSIS — I10 HYPERTENSION, UNSPECIFIED TYPE: ICD-10-CM

## 2025-05-19 DIAGNOSIS — R73.03 PREDIABETES: ICD-10-CM

## 2025-05-19 DIAGNOSIS — F32.9 MAJOR DEPRESSIVE DISORDER, REMISSION STATUS UNSPECIFIED, UNSPECIFIED WHETHER RECURRENT: ICD-10-CM

## 2025-05-19 DIAGNOSIS — K51.919 ULCERATIVE COLITIS WITH COMPLICATION, UNSPECIFIED LOCATION: ICD-10-CM

## 2025-05-19 DIAGNOSIS — F17.200 TOBACCO DEPENDENCE: ICD-10-CM

## 2025-05-19 PROBLEM — E66.813 CLASS 3 OBESITY WITH ALVEOLAR HYPOVENTILATION, SERIOUS COMORBIDITY, AND BODY MASS INDEX (BMI) OF 45.0 TO 49.9 IN ADULT: Status: RESOLVED | Noted: 2024-01-31 | Resolved: 2025-05-19

## 2025-05-19 PROBLEM — E66.2 CLASS 3 OBESITY WITH ALVEOLAR HYPOVENTILATION, SERIOUS COMORBIDITY, AND BODY MASS INDEX (BMI) OF 45.0 TO 49.9 IN ADULT: Status: RESOLVED | Noted: 2024-01-31 | Resolved: 2025-05-19

## 2025-05-19 PROCEDURE — 1159F MED LIST DOCD IN RCRD: CPT | Mod: CPTII,S$GLB,, | Performed by: FAMILY MEDICINE

## 2025-05-19 PROCEDURE — 3075F SYST BP GE 130 - 139MM HG: CPT | Mod: CPTII,S$GLB,, | Performed by: FAMILY MEDICINE

## 2025-05-19 PROCEDURE — 99999 PR PBB SHADOW E&M-EST. PATIENT-LVL V: CPT | Mod: PBBFAC,,, | Performed by: FAMILY MEDICINE

## 2025-05-19 PROCEDURE — 4010F ACE/ARB THERAPY RXD/TAKEN: CPT | Mod: CPTII,S$GLB,, | Performed by: FAMILY MEDICINE

## 2025-05-19 PROCEDURE — 3008F BODY MASS INDEX DOCD: CPT | Mod: CPTII,S$GLB,, | Performed by: FAMILY MEDICINE

## 2025-05-19 PROCEDURE — 99396 PREV VISIT EST AGE 40-64: CPT | Mod: S$GLB,,, | Performed by: FAMILY MEDICINE

## 2025-05-19 PROCEDURE — 3079F DIAST BP 80-89 MM HG: CPT | Mod: CPTII,S$GLB,, | Performed by: FAMILY MEDICINE

## 2025-05-19 RX ORDER — LEVOCETIRIZINE DIHYDROCHLORIDE 5 MG/1
5 TABLET, FILM COATED ORAL NIGHTLY
Qty: 30 TABLET | Refills: 11 | Status: SHIPPED | OUTPATIENT
Start: 2025-05-19 | End: 2026-05-19

## 2025-05-19 RX ORDER — BUSPIRONE HYDROCHLORIDE 5 MG/1
5 TABLET ORAL 2 TIMES DAILY
Qty: 60 TABLET | Refills: 11 | Status: SHIPPED | OUTPATIENT
Start: 2025-05-19 | End: 2026-05-19

## 2025-05-19 RX ORDER — FLUTICASONE PROPIONATE 50 MCG
2 SPRAY, SUSPENSION (ML) NASAL DAILY
Qty: 16 G | Refills: 2 | Status: SHIPPED | OUTPATIENT
Start: 2025-05-19

## 2025-05-19 NOTE — PATIENT INSTRUCTIONS
Orders Placed This Encounter   Procedures    CT Chest Lung Screening Low Dose    CBC Auto Differential    Comprehensive Metabolic Panel    Lipid Panel    TSH    Iron and TIBC    Ferritin    Folate    Vitamin B12    Reticulocytes    Hemoglobin A1C       Anxiety: continue zoloft 2 pills daily. ADD buspar 5 mg twice daily    Sinus/allergies: stop loratidine. Start xyzal daily and flonase 2 sprays/nostril daily

## 2025-05-19 NOTE — PROGRESS NOTES
(Portions of this note were dictated using voice recognition software and may contain dictation related errors in spelling/grammar/syntax not found on text review)    CC:   Chief Complaint   Patient presents with    Hypertension    Sinusitis       HPI: 64 y.o. female      Hospital follow-up   COPD:  Admissions in 2023 and 2024 for exacerbation.  2024 admission was coexisting with influenza infection.  Was given steroids, DuoNeb, Tamiflu.  Was diuresed with IV Lasix for elevated BNP, echo showed grade 1 diastolic dysfunction.  Was on O2 2 L         PFTs 01/31/2024  FEV1/FVC:  72 pre, 74 post.    FEV1: 68.5 predicted pre with -2.8% change post  FVC 74.7% pre with -6.5% change post  DLCO 50s 9.5% predicted  Maintenance COPD regimen: Trelegy 200. .  DuoNeb PRN    Last pulmonology visit 01/31/2024.  She plans to scheduled follow-up.      Has quit smoking for the last 4 months.  Still notices difficulty with her breathing.  Does also have anemia which has been progressive as below.  Denies any blood loss in the stool.  Has UC as below, currently on medication management.  Does get somewhat nauseated periodically.  Has PUD as well.  Follows with outside GI for colonoscopy in 2023, has not had endoscopy in awhile.    Hypertension lisinopril hydrochlorothiazide 20/12.5 mg, 2 pills daily , furosemide 40 mg daily.     Anxiety on sertraline 200 mg daily.  Recently anxiety has been more prominent.  Compliant medication.    Ulcerative colitis, chronic constipation,Peptic ulcer disease, on AcipHex, sucralfate.  Sees GI doctor in [place] , Dr. Clemons)  On mesalamine and Linzess    Restless legs and leg cramps, better with gabapentin and baclofen and Requip at night     Thinks she may have a sinus infection.  Off and sinus pressure, postnasal drip, cough symptoms for the last month.  Has Flonase but out of date.  Takes Claritin but did not really help.  Had some leftover antibiotics at home that she took for little while      Past  Medical History:   Diagnosis Date    Anxiety     Emphysema lung     GERD (gastroesophageal reflux disease)     History of stomach ulcers     Hypertension     Osteoarthritis of hip     Restless leg syndrome     Ulcerative colitis        Past Surgical History:   Procedure Laterality Date    gastric by pass      HERNIA REPAIR      HIP REPLACEMENT ARTHROPLASTY      HYSTERECTOMY      INJECTION OF JOINT Left 2024    Procedure: Left IA Hip Joint injection;  Surgeon: Mario Alberto Eagle MD;  Location: [facility];  Service: Pain Management;  Laterality: Left;    OOPHORECTOMY Right        Family History   Problem Relation Name Age of Onset    COPD Father Ced Ely     Lung cancer Father Ced Ely     Colon cancer Maternal Grandmother Sara vizcarra         Colon cancer    COPD Mother Ethel Ely             Multiple sclerosis Son      Heart disease Neg Hx      Stroke Neg Hx         Social History     Tobacco Use    Smoking status: Every Day     Current packs/day: 2.50     Average packs/day: 2.5 packs/day for 50.4 years (125.9 ttl pk-yrs)     Types: Cigarettes     Start date:     Smokeless tobacco: Never    Tobacco comments:     Enrolled in the [employer] on 21 ([facility] Member ID # [id number]). Ambulatory referral to Smoking Cessation program.    Substance Use Topics    Alcohol use: Never    Drug use: No       Lab Results   Component Value Date    WBC 2.94 (L) 2024    HGB 8.7 (L) 2024    HCT 29.6 (L) 2024    MCV 87 2024     2024    CHOL 148 2023    TRIG 83 2023    HDL 56 2023    ALT 14 2024    AST 18 2024    BILITOT 0.3 2024    ALKPHOS 86 2024     2024    K 3.9 2024     2024    CREATININE 0.7 2024    ESTGFRAFRICA >60 2021    EGFRNONAA >60 2021    CALCIUM 8.6 (L) 2024    ALBUMIN 4.0 2024    BUN 19 2024    CO2 32 (H) 2024    TSH 2.410  "06/30/2023    HGBA1C 5.9 (H) 06/30/2023    LDLCALC 75.4 06/30/2023    GLU 97 12/30/2024       Hemoglobin (g/dL)   Date Value   12/30/2024 8.7 (L)   12/29/2024 8.4 (L)   12/28/2024 8.5 (L)   12/27/2024 9.3 (L)   12/31/2023 11.6 (L)   12/30/2023 10.6 (L)   12/29/2023 10.6 (L)   12/28/2023 11.0 (L)   12/27/2023 11.4 (L)   12/26/2023 11.7 (L)           Vital signs reviewed  Vitals:    05/19/25 1508   BP: 130/84   BP Location: Right arm   Patient Position: Sitting   Pulse: 80   Temp: 97.9 °F (36.6 °C)   TempSrc: Oral   SpO2: (!) 93%   Weight: 125 kg (275 lb 9.2 oz)   Height: 5' 7" (1.702 m)           Wt Readings from Last 10 Encounters:   05/19/25 125 kg (275 lb 9.2 oz)   12/28/24 123.4 kg (272 lb)   08/28/24 121 kg (266 lb 12.1 oz)   07/31/24 122.5 kg (270 lb)   01/31/24 131.2 kg (289 lb 3.9 oz)   01/12/24 130.6 kg (287 lb 14.7 oz)   12/29/23 (!) 136.9 kg (301 lb 13 oz)   06/28/23 (!) 139.5 kg (307 lb 8.7 oz)   06/08/23 (!) 141.1 kg (311 lb)   09/23/22 (!) 138.8 kg (306 lb)       PE:   APPEARANCE: Well nourished, well developed, in no acute distress.    HEAD: Normocephalic, atraumatic.  NECK: Supple with no cervical lymphadenopathy.    CHEST: Good inspiratory effort.  Mildly decreased breath sounds, very slight wheezing on right posterior lung field  CARDIOVASCULAR: Normal S1, S2. No rubs, murmurs, or gallops.  ABDOMEN: Bowel sounds normal. Not distended. Soft. No tenderness or masses. No organomegaly.  EXTREMITIES:  No edema    IMPRESSION  1. Routine general medical examination at a health care facility    2. Hypertension, unspecified type    3. Chronic obstructive pulmonary disease, unspecified COPD type    4. Anemia, unspecified type    5. Folate deficiency    6. Low vitamin B12 level    7. Restrictive lung disease    8. Prediabetes    9. Ulcerative colitis with complication, unspecified location    10. Tobacco dependence    11. Major depressive disorder, remission status unspecified, unspecified whether recurrent  "   12. BMI 45.0-49.9, adult    13. Anxiety                  PLAN  Hypertension , stable.  Continue current therapy    Anxiety  Continue sertraline.  Add BuSpar 5 mg b.i.d. given her breakthrough symptoms.    COPD with restrictive lung disease, recent COPD exacerbation s/p abx and steroids, on supplemental O2 from \[facility]\""     Tregy 200, 1 puff daily, DuoNeb as needed for breakthrough.  Make sure to follow up with pulmonology as directed     Anemia, trends as above.  Denies any bleeding concerns.  She sees outside GI, colonoscopy done late 2023 ., reassess.  May need repeat GI eval for EGD/colon. trend labs    UC: Continue gastroenterology follow-up     Possibly allergic rhinosinusitis: Trial of Flonase plus Xyzal.  Stopped Claritin                     Orders Placed This Encounter   Procedures    CT Chest Lung Screening Low Dose    CBC Auto Differential    Comprehensive Metabolic Panel    Lipid Panel    TSH    Iron and TIBC    Ferritin    Folate    Vitamin B12    Reticulocytes    Hemoglobin A1C           SCREENINGS       Immunizations:   COVID vaccine has not had:  counseling provided prior.   Ppsv23: 2021  Tdap 08/26/2021    Flu utd    Age/demographic appropriate health maintenance:  Colon cancer screening 2023 :  Sees outside GI (will send records) Dr. Clemons ([facility])  Mammogram 08/12/2024   LD CT ordered

## 2025-06-03 DIAGNOSIS — J44.9 CHRONIC OBSTRUCTIVE PULMONARY DISEASE, UNSPECIFIED COPD TYPE: ICD-10-CM

## 2025-06-03 DIAGNOSIS — F41.9 ANXIETY: ICD-10-CM

## 2025-06-03 RX ORDER — BUSPIRONE HYDROCHLORIDE 5 MG/1
5 TABLET ORAL 2 TIMES DAILY
Qty: 180 TABLET | Refills: 3 | Status: SHIPPED | OUTPATIENT
Start: 2025-06-03

## 2025-06-03 RX ORDER — FLUTICASONE FUROATE, UMECLIDINIUM BROMIDE AND VILANTEROL TRIFENATATE 200; 62.5; 25 UG/1; UG/1; UG/1
POWDER RESPIRATORY (INHALATION)
Qty: 180 EACH | Refills: 3 | Status: SHIPPED | OUTPATIENT
Start: 2025-06-03

## 2025-06-04 ENCOUNTER — PATIENT MESSAGE (OUTPATIENT)
Dept: FAMILY MEDICINE | Facility: CLINIC | Age: 65
End: 2025-06-04
Payer: COMMERCIAL

## 2025-06-04 ENCOUNTER — HOSPITAL ENCOUNTER (OUTPATIENT)
Dept: RADIOLOGY | Facility: HOSPITAL | Age: 65
Discharge: HOME | End: 2025-06-04
Attending: FAMILY MEDICINE
Payer: COMMERCIAL

## 2025-06-04 ENCOUNTER — TELEPHONE (OUTPATIENT)
Dept: PULMONOLOGY | Facility: CLINIC | Age: 65
End: 2025-06-04
Payer: COMMERCIAL

## 2025-06-04 DIAGNOSIS — F17.200 TOBACCO DEPENDENCE: ICD-10-CM

## 2025-06-04 DIAGNOSIS — K51.919 ULCERATIVE COLITIS WITH COMPLICATION, UNSPECIFIED LOCATION: ICD-10-CM

## 2025-06-04 DIAGNOSIS — D50.9 IRON DEFICIENCY ANEMIA, UNSPECIFIED IRON DEFICIENCY ANEMIA TYPE: ICD-10-CM

## 2025-06-04 DIAGNOSIS — R73.01 IFG (IMPAIRED FASTING GLUCOSE): ICD-10-CM

## 2025-06-04 DIAGNOSIS — R79.89 LOW VITAMIN B12 LEVEL: Primary | ICD-10-CM

## 2025-06-04 DIAGNOSIS — R91.1 SOLID NODULE OF LUNG GREATER THAN 8 MM IN DIAMETER: ICD-10-CM

## 2025-06-04 DIAGNOSIS — E53.8 FOLATE DEFICIENCY: ICD-10-CM

## 2025-06-04 DIAGNOSIS — D64.9 ANEMIA, UNSPECIFIED TYPE: ICD-10-CM

## 2025-06-04 DIAGNOSIS — J44.9 CHRONIC OBSTRUCTIVE PULMONARY DISEASE, UNSPECIFIED COPD TYPE: Primary | ICD-10-CM

## 2025-06-04 PROCEDURE — 71271 CT THORAX LUNG CANCER SCR C-: CPT | Mod: 26,,, | Performed by: RADIOLOGY

## 2025-06-04 PROCEDURE — 71271 CT THORAX LUNG CANCER SCR C-: CPT | Mod: TC,PN

## 2025-06-17 ENCOUNTER — HOSPITAL ENCOUNTER (EMERGENCY)
Facility: HOSPITAL | Age: 65
Discharge: HOME | End: 2025-06-17
Attending: EMERGENCY MEDICINE
Payer: COMMERCIAL

## 2025-06-17 VITALS
SYSTOLIC BLOOD PRESSURE: 124 MMHG | HEART RATE: 90 BPM | BODY MASS INDEX: 43.16 KG/M2 | HEIGHT: 67 IN | DIASTOLIC BLOOD PRESSURE: 67 MMHG | TEMPERATURE: 98 F | RESPIRATION RATE: 22 BRPM | OXYGEN SATURATION: 90 % | WEIGHT: 275 LBS

## 2025-06-17 DIAGNOSIS — J98.4 PNEUMONITIS: Primary | ICD-10-CM

## 2025-06-17 DIAGNOSIS — B34.9 VIRAL SYNDROME: ICD-10-CM

## 2025-06-17 DIAGNOSIS — J44.9 CHRONIC OBSTRUCTIVE PULMONARY DISEASE, UNSPECIFIED COPD TYPE: ICD-10-CM

## 2025-06-17 LAB
ABSOLUTE EOSINOPHIL (OHS): 0.17 K/UL
ABSOLUTE MONOCYTE (OHS): 0.55 K/UL (ref 0.3–1)
ABSOLUTE NEUTROPHIL COUNT (OHS): 8.19 K/UL (ref 1.8–7.7)
ALBUMIN SERPL BCP-MCNC: 3.6 G/DL (ref 3.5–5.2)
ALP SERPL-CCNC: 90 UNIT/L (ref 38–126)
ALT SERPL W/O P-5'-P-CCNC: 12 UNIT/L (ref 10–44)
ANION GAP (OHS): 11 MMOL/L (ref 8–16)
AST SERPL-CCNC: 14 UNIT/L (ref 15–46)
BASOPHILS # BLD AUTO: 0.04 K/UL
BASOPHILS NFR BLD AUTO: 0.4 %
BILIRUB SERPL-MCNC: 0.2 MG/DL (ref 0.1–1)
BUN SERPL-MCNC: 13 MG/DL (ref 7–17)
CALCIUM SERPL-MCNC: 8.8 MG/DL (ref 8.7–10.5)
CHLORIDE SERPL-SCNC: 104 MMOL/L (ref 95–110)
CO2 SERPL-SCNC: 24 MMOL/L (ref 23–29)
CREAT SERPL-MCNC: 0.7 MG/DL (ref 0.5–1.4)
ERYTHROCYTE [DISTWIDTH] IN BLOOD BY AUTOMATED COUNT: 16.2 % (ref 11.5–14.5)
GFR SERPLBLD CREATININE-BSD FMLA CKD-EPI: >60 ML/MIN/1.73/M2
GLUCOSE SERPL-MCNC: 109 MG/DL (ref 70–110)
HCT VFR BLD AUTO: 31.9 % (ref 37–48.5)
HGB BLD-MCNC: 9.6 GM/DL (ref 12–16)
IMM GRANULOCYTES # BLD AUTO: 0.03 K/UL (ref 0–0.04)
IMM GRANULOCYTES NFR BLD AUTO: 0.3 % (ref 0–0.5)
INFLUENZA A MOLECULAR (OHS): NEGATIVE
INFLUENZA B MOLECULAR (OHS): NEGATIVE
LYMPHOCYTES # BLD AUTO: 1.05 K/UL (ref 1–4.8)
MCH RBC QN AUTO: 23.1 PG (ref 27–31)
MCHC RBC AUTO-ENTMCNC: 30.1 G/DL (ref 32–36)
MCV RBC AUTO: 77 FL (ref 82–98)
NT-PROBNP SERPL-MCNC: 188 PG/ML (ref 5–900)
NUCLEATED RBC (/100WBC) (OHS): 0 /100 WBC
PLATELET # BLD AUTO: 242 K/UL (ref 150–450)
PMV BLD AUTO: 10 FL (ref 9.2–12.9)
POTASSIUM SERPL-SCNC: 3.8 MMOL/L (ref 3.5–5.1)
PROT SERPL-MCNC: 6.9 GM/DL (ref 6–8.4)
RBC # BLD AUTO: 4.15 M/UL (ref 4–5.4)
RELATIVE EOSINOPHIL (OHS): 1.7 %
RELATIVE LYMPHOCYTE (OHS): 10.5 % (ref 18–48)
RELATIVE MONOCYTE (OHS): 5.5 % (ref 4–15)
RELATIVE NEUTROPHIL (OHS): 81.6 % (ref 38–73)
SARS-COV-2 RDRP RESP QL NAA+PROBE: NEGATIVE
SODIUM SERPL-SCNC: 139 MMOL/L (ref 136–145)
TROPONIN I SERPL DL<=0.01 NG/ML-MCNC: <0.012 NG/ML (ref 0–0.03)
WBC # BLD AUTO: 10.03 K/UL (ref 3.9–12.7)

## 2025-06-17 PROCEDURE — 84484 ASSAY OF TROPONIN QUANT: CPT | Mod: ER

## 2025-06-17 PROCEDURE — 25000242 PHARM REV CODE 250 ALT 637 W/ HCPCS: Mod: ER

## 2025-06-17 PROCEDURE — 93005 ELECTROCARDIOGRAM TRACING: CPT | Mod: ER

## 2025-06-17 PROCEDURE — 87502 INFLUENZA DNA AMP PROBE: CPT | Mod: ER | Performed by: STUDENT IN AN ORGANIZED HEALTH CARE EDUCATION/TRAINING PROGRAM

## 2025-06-17 PROCEDURE — 94640 AIRWAY INHALATION TREATMENT: CPT | Mod: ER

## 2025-06-17 PROCEDURE — 25000003 PHARM REV CODE 250: Mod: ER

## 2025-06-17 PROCEDURE — 63600175 PHARM REV CODE 636 W HCPCS: Mod: JZ,TB,ER

## 2025-06-17 PROCEDURE — U0002 COVID-19 LAB TEST NON-CDC: HCPCS | Mod: ER | Performed by: STUDENT IN AN ORGANIZED HEALTH CARE EDUCATION/TRAINING PROGRAM

## 2025-06-17 PROCEDURE — 83880 ASSAY OF NATRIURETIC PEPTIDE: CPT | Mod: ER

## 2025-06-17 PROCEDURE — 93010 ELECTROCARDIOGRAM REPORT: CPT | Mod: ,,, | Performed by: INTERNAL MEDICINE

## 2025-06-17 PROCEDURE — 99285 EMERGENCY DEPT VISIT HI MDM: CPT | Mod: 25,ER

## 2025-06-17 PROCEDURE — 96375 TX/PRO/DX INJ NEW DRUG ADDON: CPT | Mod: ER

## 2025-06-17 PROCEDURE — 94760 N-INVAS EAR/PLS OXIMETRY 1: CPT | Mod: ER

## 2025-06-17 PROCEDURE — 82040 ASSAY OF SERUM ALBUMIN: CPT | Mod: ER

## 2025-06-17 PROCEDURE — 85025 COMPLETE CBC W/AUTO DIFF WBC: CPT | Mod: ER

## 2025-06-17 PROCEDURE — 96374 THER/PROPH/DIAG INJ IV PUSH: CPT | Mod: ER

## 2025-06-17 RX ORDER — ACETAMINOPHEN 500 MG
1000 TABLET ORAL 4 TIMES DAILY
Qty: 112 TABLET | Refills: 0 | Status: SHIPPED | OUTPATIENT
Start: 2025-06-17 | End: 2025-07-01

## 2025-06-17 RX ORDER — PREDNISONE 20 MG/1
40 TABLET ORAL DAILY
Qty: 8 TABLET | Refills: 0 | Status: SHIPPED | OUTPATIENT
Start: 2025-06-17 | End: 2025-06-21

## 2025-06-17 RX ORDER — PROCHLORPERAZINE EDISYLATE 5 MG/ML
10 INJECTION INTRAMUSCULAR; INTRAVENOUS
Status: COMPLETED | OUTPATIENT
Start: 2025-06-17 | End: 2025-06-17

## 2025-06-17 RX ORDER — MELOXICAM 7.5 MG/1
7.5 TABLET ORAL DAILY
Qty: 14 TABLET | Refills: 0 | Status: SHIPPED | OUTPATIENT
Start: 2025-06-17 | End: 2025-07-01

## 2025-06-17 RX ORDER — CETIRIZINE HYDROCHLORIDE 10 MG/1
10 TABLET ORAL DAILY
Qty: 30 TABLET | Refills: 0 | Status: SHIPPED | OUTPATIENT
Start: 2025-06-17 | End: 2025-07-17

## 2025-06-17 RX ORDER — BENZONATATE 200 MG/1
200 CAPSULE ORAL 3 TIMES DAILY PRN
Qty: 30 CAPSULE | Refills: 0 | Status: SHIPPED | OUTPATIENT
Start: 2025-06-17 | End: 2025-06-27

## 2025-06-17 RX ORDER — IPRATROPIUM BROMIDE AND ALBUTEROL SULFATE 2.5; .5 MG/3ML; MG/3ML
6 SOLUTION RESPIRATORY (INHALATION)
Status: COMPLETED | OUTPATIENT
Start: 2025-06-17 | End: 2025-06-17

## 2025-06-17 RX ORDER — PREDNISONE 20 MG/1
40 TABLET ORAL
Status: COMPLETED | OUTPATIENT
Start: 2025-06-17 | End: 2025-06-17

## 2025-06-17 RX ORDER — KETOROLAC TROMETHAMINE 30 MG/ML
15 INJECTION, SOLUTION INTRAMUSCULAR; INTRAVENOUS
Status: COMPLETED | OUTPATIENT
Start: 2025-06-17 | End: 2025-06-17

## 2025-06-17 RX ORDER — LEVOFLOXACIN 750 MG/1
750 TABLET, FILM COATED ORAL
Status: COMPLETED | OUTPATIENT
Start: 2025-06-17 | End: 2025-06-17

## 2025-06-17 RX ORDER — DIPHENHYDRAMINE HYDROCHLORIDE 50 MG/ML
25 INJECTION, SOLUTION INTRAMUSCULAR; INTRAVENOUS
Status: COMPLETED | OUTPATIENT
Start: 2025-06-17 | End: 2025-06-17

## 2025-06-17 RX ORDER — LEVOFLOXACIN 750 MG/1
750 TABLET, FILM COATED ORAL DAILY
Qty: 4 TABLET | Refills: 0 | Status: SHIPPED | OUTPATIENT
Start: 2025-06-17 | End: 2025-06-21

## 2025-06-17 RX ADMIN — LEVOFLOXACIN 750 MG: 750 TABLET, FILM COATED ORAL at 07:06

## 2025-06-17 RX ADMIN — PROCHLORPERAZINE EDISYLATE 10 MG: 5 INJECTION INTRAMUSCULAR; INTRAVENOUS at 05:06

## 2025-06-17 RX ADMIN — DIPHENHYDRAMINE HYDROCHLORIDE 25 MG: 50 INJECTION INTRAMUSCULAR; INTRAVENOUS at 05:06

## 2025-06-17 RX ADMIN — KETOROLAC TROMETHAMINE 15 MG: 30 INJECTION, SOLUTION INTRAMUSCULAR; INTRAVENOUS at 05:06

## 2025-06-17 RX ADMIN — PREDNISONE 40 MG: 20 TABLET ORAL at 07:06

## 2025-06-17 RX ADMIN — IPRATROPIUM BROMIDE AND ALBUTEROL SULFATE 6 ML: .5; 3 SOLUTION RESPIRATORY (INHALATION) at 05:06

## 2025-06-17 NOTE — ED PROVIDER NOTES
"Encounter Date: 6/17/2025       History     Chief Complaint   Patient presents with    Generalized Body Aches    Headache     Pt reports body aches and headache X few weeks. Reports "a popping sound in my head." States "my oxygen was 85." Pt has COPD. Also reports left shoulder pain. Denies falls or injuries. No meds for symptoms.      Umu Clayton is a 65 y.o. female  has a past medical history of Anxiety, Emphysema lung, GERD (gastroesophageal reflux disease), History of stomach ulcers, Hypertension, Osteoarthritis of hip, Restless leg syndrome, and Ulcerative colitis. presenting to the Emergency Department for headache, chills, stuffy nose, runny nose, sore throat, productive cough, body aches times 1-2 weeks.  Patient describes the headache as being in the front of her head.  Patient also reports having a "popping sound in the back of her head".  Patient states the popping sensation comes intermittently.  Also reports associated photophobia.  Patient reports the headache came on gradually but has not ever had the popping sound associated with it.  Patient reports subjective fevers.  Reports a history of COPD but denies having a chronic cough.  Patient states productive cough returned 2-3 days ago with the associated worsening shortness of breath.  Patient states she quit smoking about 5 months ago.  Patient states she is on 1 L nasal cannula as needed when at home.  Patient states she feels more short of breath on exertion.  Patient states couple days ago at home her oxygen was 85% on 1 L. patient is satting at 90% on room air today.  Patient reports some associated nausea but denies any vomiting, abdominal pain, diarrhea, constipation, dysuria, frequency, urgency.  No other complaints at this time.        The history is provided by the patient.     Review of patient's allergies indicates:   Allergen Reactions    Penicillins Hives     Past Medical History:   Diagnosis Date    Anxiety     Emphysema lung     GERD " (gastroesophageal reflux disease)     History of stomach ulcers     Hypertension     Osteoarthritis of hip     Restless leg syndrome     Ulcerative colitis      Past Surgical History:   Procedure Laterality Date    gastric by pass      HERNIA REPAIR      HIP REPLACEMENT ARTHROPLASTY      HYSTERECTOMY      INJECTION OF JOINT Left 2024    Procedure: Left IA Hip Joint injection;  Surgeon: Mario Alberto Eagle MD;  Location: [facility];  Service: Pain Management;  Laterality: Left;    OOPHORECTOMY Right      Family History   Problem Relation Name Age of Onset    COPD Father Ced Ely     Lung cancer Father Ced Ely     Colon cancer Maternal Grandmother Sara vizcarra         Colon cancer    COPD Mother Ethel Ely             Multiple sclerosis Son      Heart disease Neg Hx      Stroke Neg Hx       Social History[1]  Review of Systems   Constitutional:  Positive for chills. Negative for fever.   HENT:  Positive for congestion, rhinorrhea and sore throat.    Respiratory:  Positive for cough and shortness of breath.    Cardiovascular:  Negative for chest pain.   Gastrointestinal:  Positive for nausea. Negative for abdominal pain, diarrhea and vomiting.   Genitourinary:  Negative for dysuria.   Musculoskeletal:  Positive for myalgias. Negative for back pain.   Skin:  Negative for rash.   Neurological:  Positive for headaches. Negative for weakness.   Hematological:  Does not bruise/bleed easily.   All other systems reviewed and are negative.      Physical Exam     Initial Vitals [25 1631]   BP Pulse Resp Temp SpO2   (!) 137/104 106 (!) 21 98.4 °F (36.9 °C) (!) 90 %      MAP       --         Physical Exam    Nursing note and vitals reviewed.  Constitutional: Vital signs are normal. She appears well-developed and well-nourished. She is not diaphoretic. She is active.  Non-toxic appearance. No distress.   HENT:   Head: Normocephalic and atraumatic.   Right Ear: Hearing, tympanic membrane,  external ear and ear canal normal.   Left Ear: Hearing, tympanic membrane, external ear and ear canal normal.   Nose: Nose normal. Right sinus exhibits no maxillary sinus tenderness and no frontal sinus tenderness. Left sinus exhibits no maxillary sinus tenderness and no frontal sinus tenderness. Mouth/Throat: Uvula is midline, oropharynx is clear and moist and mucous membranes are normal. No trismus in the jaw. No uvula swelling. No oropharyngeal exudate, posterior oropharyngeal edema or posterior oropharyngeal erythema.   Eyes: Conjunctivae, EOM and lids are normal. Pupils are equal, round, and reactive to light.   Neck: Phonation normal. Neck supple. No Brudzinski's sign and no Kernig's sign noted.   Normal range of motion.  Cardiovascular:  Normal rate, regular rhythm, normal heart sounds, intact distal pulses and normal pulses.           Pulmonary/Chest: Breath sounds normal. No respiratory distress. She has no wheezes. She has no rhonchi. She has no rales.   Abdominal: She exhibits no distension.   Musculoskeletal:         General: No edema. Normal range of motion.      Cervical back: Normal range of motion and neck supple. No rigidity. Normal range of motion.     Lymphadenopathy:     She has no cervical adenopathy.   Neurological: She is alert and oriented to person, place, and time. She is not disoriented. GCS score is 15. GCS eye subscore is 4. GCS verbal subscore is 5. GCS motor subscore is 6.   Skin: Skin is warm and dry.   Psychiatric: She has a normal mood and affect. Her behavior is normal. Judgment and thought content normal.         ED Course   Procedures  Labs Reviewed   COMPREHENSIVE METABOLIC PANEL - Abnormal       Result Value    Sodium 139      Potassium 3.8      Chloride 104      CO2 24      Glucose 109      BUN 13      Creatinine 0.7      Calcium 8.8      Protein Total 6.9      Albumin 3.6      Bilirubin Total 0.2      ALP 90      AST 14 (*)     ALT 12      Anion Gap 11      eGFR >60     CBC  WITH DIFFERENTIAL - Abnormal    WBC 10.03      RBC 4.15      HGB 9.6 (*)     HCT 31.9 (*)     MCV 77 (*)     MCH 23.1 (*)     MCHC 30.1 (*)     RDW 16.2 (*)     Platelet Count 242      MPV 10.0      Nucleated RBC 0      Neut % 81.6 (*)     Lymph % 10.5 (*)     Mono % 5.5      Eos % 1.7      Basophil % 0.4      Imm Grans % 0.3      Neut # 8.19 (*)     Lymph # 1.05      Mono # 0.55      Eos # 0.17      Baso # 0.04      Imm Grans # 0.03     INFLUENZA A & B BY MOLECULAR - Normal    INFLUENZA A MOLECULAR Negative      INFLUENZA B MOLECULAR  Negative     SARS-COV-2 RNA AMPLIFICATION, QUAL - Normal    SARS COV-2 Molecular Negative     TROPONIN I - Normal    Troponin-I <0.012     NT-PRO NATRIURETIC PEPTIDE - Normal    NT-proBNP 188     CBC W/ AUTO DIFFERENTIAL    Narrative:     The following orders were created for panel order CBC auto differential.  Procedure                               Abnormality         Status                     ---------                               -----------         ------                     CBC with Differential[[id number]]       Abnormal            Final result                 Please view results for these tests on the individual orders.          Imaging Results              CT Head Without Contrast (Final result)  Result time 06/17/25 17:52:54      Final result by Allen Prescott MD (06/17/25 17:52:54)                   Impression:      No acute intracranial finding.    ASPECT Score: 10    Finalized on: 6/17/2025 5:52 PM By:  Allen Prescott MD  [facility]# [id number]      2025-06-17 17:54:59.671     [facility]               Narrative:    EXAM: CT HEAD WITHOUT CONTRAST    CLINICAL HISTORY: Headache, new or worsening (Age >= 50y);    COMPARISON: None    TECHNIQUE: Standard thin section axial images of the brain with reformatted sagittal and coronal images. All CT scans at this location are performed using dose modulation techniques as appropriate to a performed exam including the following: automated  exposure control; adjustment of the mA and/or kV according to patient size (this includes techniques or standardized protocols for targeted exams where dose is matched to indication / reason for exam; i.e. extremities or head); use of iterative reconstruction technique.    FINDINGS:  Intracranial compartment:  Ventricles and sulci are normal in size for age without evidence of hydrocephalus.  No extra-axial blood or fluid collections.    The brain parenchyma appears normal.  No parenchymal mass, hemorrhage, edema, or major vascular distribution infarct.    Skull/extracranial contents: (Limited evaluation): No fracture.  Mastoid air cells and paranasal sinuses are essentially clear.                                         X-Ray Chest 1 View (Final result)  Result time 06/17/25 17:53:51      Final result by Allen Prescott MD (06/17/25 17:53:51)                   Narrative:    EXAM: XR CHEST 1 VIEW    CLINICAL HISTORY: [R06.02]-Shortness of breath.    COMPARISON:  12/27/2054.    FINDINGS:  Mediastinal silhouette is stable.  Interstitial and airspace opacities primarily in the left lung.  No pneumothorax.  No definite pleural effusion.    IMPRESSION:  Findings most likely representing left lung pneumonitis.  Follow-up chest x-ray in 8-12 weeks recommended to demonstrate improvement.    Finalized on: 6/17/2025 5:53 PM By:  Allen Prescott MD  [facility]# [id number]      2025-06-17 17:55:59.732     [facility]                                     Medications   prochlorperazine injection Soln 10 mg (10 mg Intravenous Given 6/17/25 1747)   diphenhydrAMINE injection 25 mg (25 mg Intravenous Given 6/17/25 1747)   ketorolac injection 15 mg (15 mg Intravenous Given 6/17/25 1747)   albuterol-ipratropium 2.5 mg-0.5 mg/3 mL nebulizer solution 6 mL (6 mLs Nebulization Given 6/17/25 1752)   levoFLOXacin tablet 750 mg (750 mg Oral Given 6/17/25 1902)   predniSONE tablet 40 mg (40 mg Oral Given 6/17/25 1902)     Medical Decision Making  This is an  emergent evaluation of a 65 y.o. female that presents to the Emergency Department for multiple symptoms. The patient is a non-toxic and not acutely ill-appearing, afebrile, and well appearing female. Pertinent physical exam findings above. Appears well hydrated with moist mucus membranes. Neck soft and supple with no meningeal signs. Breath sounds are clear and equal bilaterally. No tachypnea or respiratory distress and no evidence of hypoxia or cyanosis. Vital signs are reassuring.      My overall impression is :  Pneumonitis (Primary)  Shortness of breath  Chronic obstructive pulmonary disease, unspecified COPD type  . Differential Diagnosis: Including but not limited to Sepsis, meningitis, nasal/aspirated foreign body, OM, OE, nasal polyp, bacterial sinusitis, allergic rhinitis, peritonsillar abscess, retropharyngeal abscess, epiglottitis, bacterial/viral pneumonia, bacterial/viral pharyngitis, croup, bronchiolitis, influenza, viral syndrome     Discharge Meds/Instructions: Supportive care, Tylenol/Ibuprofen PRN, Hydration. Meds below. Outpatient f/u. Will treat pneumonitis as possible pneumonia.  We will started on levofloxacin and cover for COPD exacerbation as well.       There does not appear to be any indication for further emergent testing, observation, or hospitalization at this time. A mutual shared decision making discussion was had with the patient. Patient appears stable for and is comfortable with discharge home. The diagnosis, treatment plan, instructions for follow-up as well as ED return precautions were discussed. Advised to follow-up with PCP for outpatient follow-up in 2-3 days. Signs and symptoms that would warrant immediate return to ED were reviewed prior to discharge. All questions and concerns were asked, answered, and addressed. Patient expressed understanding and agreement with the plan.     Amount and/or Complexity of Data Reviewed  Labs: ordered. Decision-making details documented in ED  Course.  Radiology: ordered and independent interpretation performed. Decision-making details documented in ED Course.  ECG/medicine tests: ordered and independent interpretation performed. Decision-making details documented in ED Course.    Risk  OTC drugs.  Prescription drug management.  Decision regarding hospitalization.               ED Course as of 06/17/25 2035 Tue Jun 17, 2025 1709 EKG 12-lead  Independent interpretation by me: Normal sinus rhythm.  81 beats per minute.  No STEMI.  No ectopy. []   1725 Influenza A, Molecular: Negative []   1725 Influenza B, Molecular: Negative [LH]   1726 SARS COV-2 MOLECULAR: Negative [LH]   1748 AST(!): 14 [LH]   1758 NT-proBNP: 188 []   1758 Troponin I: <0.012 []   1809 CT Head Without Contrast  I have read the radiologist's report and agree with their findings.  No acute abnormality.     [LH]   1809 X-Ray Chest 1 View  Independent interpretation by me: pneumonitis.  Cardiac silhouette appears normal. I have also read the radiologist's report and agree with their findings.    []   1906 Patient is ambulatory O2 sats saying at 89%.  Appropriate since patient has COPD history.  Patient states he sat has been 90 and 94% on room air.  This is appropriate given patient's COPD history. []      ED Course User Index  [] Iesha Reinoso PA-C                           Clinical Impression:  Final diagnoses:  [J98.4] Pneumonitis (Primary)  [J44.9] Chronic obstructive pulmonary disease, unspecified COPD type  [B34.9] Viral syndrome          ED Disposition Condition    Discharge Stable          ED Prescriptions       Medication Sig Dispense Start Date End Date Auth. Provider    levoFLOXacin (LEVAQUIN) 750 MG tablet Take 1 tablet (750 mg total) by mouth once daily. for 4 days 4 tablet 6/17/2025 6/21/2025 Iesha Reinoso PA-C    predniSONE (DELTASONE) 20 MG tablet Take 2 tablets (40 mg total) by mouth once daily. for 4 days 8 tablet 6/17/2025 6/21/2025 Iesha Reinoso PA-C     benzonatate (TESSALON) 200 MG capsule Take 1 capsule (200 mg total) by mouth 3 (three) times daily as needed. 30 capsule 6/17/2025 6/27/2025 Iesha Reinoso PA-C    meloxicam (MOBIC) 7.5 MG tablet Take 1 tablet (7.5 mg total) by mouth once daily. for 14 days 14 tablet 6/17/2025 7/1/2025 Iesha Reinoso PA-C    acetaminophen (TYLENOL) 500 MG tablet Take 2 tablets (1,000 mg total) by mouth 4 (four) times daily. for 14 days 112 tablet 6/17/2025 7/1/2025 Iesha Reinoso PA-C    cetirizine (ZYRTEC) 10 MG tablet Take 1 tablet (10 mg total) by mouth once daily. 30 tablet 6/17/2025 7/17/2025 Iesha Reinoso PA-C          Follow-up Information    None                [1]   Social History  Tobacco Use    Smoking status: Every Day     Current packs/day: 2.50     Average packs/day: 2.5 packs/day for 50.5 years (126.1 ttl pk-yrs)     Types: Cigarettes     Start date: 1975    Smokeless tobacco: Never    Tobacco comments:     Enrolled in the [employer] on 7/1/21 ([employer] Member ID # [id number]). Ambulatory referral to Smoking Cessation program.    Substance Use Topics    Alcohol use: Never    Drug use: No        Iesha Reinoso PA-C  06/17/25 2035

## 2025-06-18 LAB
OHS QRS DURATION: 90 MS
OHS QTC CALCULATION: 436 MS

## 2025-07-09 DIAGNOSIS — M25.512 LEFT SHOULDER PAIN, UNSPECIFIED CHRONICITY: Primary | ICD-10-CM

## 2025-07-18 ENCOUNTER — OFFICE VISIT (OUTPATIENT)
Dept: SPORTS MEDICINE | Facility: CLINIC | Age: 65
End: 2025-07-18
Payer: COMMERCIAL

## 2025-07-18 ENCOUNTER — HOSPITAL ENCOUNTER (OUTPATIENT)
Dept: RADIOLOGY | Facility: HOSPITAL | Age: 65
Discharge: HOME | End: 2025-07-18
Attending: STUDENT IN AN ORGANIZED HEALTH CARE EDUCATION/TRAINING PROGRAM
Payer: COMMERCIAL

## 2025-07-18 DIAGNOSIS — M25.512 LEFT SHOULDER PAIN, UNSPECIFIED CHRONICITY: ICD-10-CM

## 2025-07-18 DIAGNOSIS — G89.29 CHRONIC PAIN IN LEFT SHOULDER: Primary | ICD-10-CM

## 2025-07-18 DIAGNOSIS — M25.512 CHRONIC PAIN IN LEFT SHOULDER: Primary | ICD-10-CM

## 2025-07-18 DIAGNOSIS — M75.42 IMPINGEMENT SYNDROME OF LEFT SHOULDER REGION: ICD-10-CM

## 2025-07-18 DIAGNOSIS — M25.512 PERISCAPULAR PAIN OF LEFT SHOULDER: ICD-10-CM

## 2025-07-18 DIAGNOSIS — M67.912 TENDINOPATHY OF LEFT ROTATOR CUFF: ICD-10-CM

## 2025-07-18 PROCEDURE — 73030 X-RAY EXAM OF SHOULDER: CPT | Mod: TC,PO,LT

## 2025-07-18 PROCEDURE — 73030 X-RAY EXAM OF SHOULDER: CPT | Mod: 26,LT,, | Performed by: RADIOLOGY

## 2025-07-18 PROCEDURE — 99999 PR PBB SHADOW E&M-EST. PATIENT-LVL III: CPT | Mod: PBBFAC,,, | Performed by: STUDENT IN AN ORGANIZED HEALTH CARE EDUCATION/TRAINING PROGRAM

## 2025-07-18 RX ORDER — BETAMETHASONE SODIUM PHOSPHATE AND BETAMETHASONE ACETATE 3; 3 MG/ML; MG/ML
6 INJECTION, SUSPENSION INTRA-ARTICULAR; INTRALESIONAL; INTRAMUSCULAR; SOFT TISSUE
Status: DISCONTINUED | OUTPATIENT
Start: 2025-07-18 | End: 2025-07-18 | Stop reason: HOSPADM

## 2025-07-18 RX ADMIN — BETAMETHASONE SODIUM PHOSPHATE AND BETAMETHASONE ACETATE 6 MG: 3; 3 INJECTION, SUSPENSION INTRA-ARTICULAR; INTRALESIONAL; INTRAMUSCULAR; SOFT TISSUE at 02:07

## 2025-07-18 NOTE — PATIENT INSTRUCTIONS
Assessment:  Umu Clayton is a 65 y.o. female with a chief complaint of Pain of the Left Shoulder    Encounter Diagnoses   Name Primary?    Chronic pain in left shoulder Yes    Periscapular pain of left shoulder     Tendinopathy of left rotator cuff     Impingement syndrome of left shoulder region       Plan:  X-rays reviewed - mild degenerative changes about left shoulder.    Labs reviewed - Cr 0.7, GFR > 60, LFTs WNL  History and clinical exam is consistent with chronic left shoulder pain due to subacromial impingement syndrome and rotator cuff tendinopathy.  No evidence of larger high-grade rotator cuff injury or tear.    Recommend conservative management.    Subacromial bursa corticosteroid injection today.    Proper protocols after the injection included: no submerging pools, baths tubs, or hot tubs for 24 hr.  Showering is okay today.  Side effects of the corticosteroid injection can include elevated blood glucose levels and blood pressures, so if you are taking medications for these, please monitor closely, and contact your PCP if any issues.  Red flag symptoms include fever, chills, nausea, vomiting, red, warm, tender joint at the area of injection.  If you are noticing these symptoms, they may be indicative of an infection, and please seek medical care immediately, either by calling our clinic or going to the emergency room.  Pain persists, may warrant physical therapy.    Follow-up:  As needed or sooner if there are any problems between now and then.    Thank you for choosing [facility] and Dr. Hola Duke for your orthopedic & sports medicine care. It is our goal to provide you with exceptional care that will help keep you healthy, active, and get you back in the game.    Please do not hesitate to reach out to us via email, phone, or MyChart with any questions, concerns, or feedback.    If you are experiencing pain/discomfort, or have questions after 5pm and would like to be  connected to the [facility] on-call team, please call this number and specify which Sports Medicine provider is treating you: [phone]  During business hours, before 5 PM, if you have any questions or concerns, please call this number and as to speak with a member of Dr Duke's team: [phone]

## 2025-07-18 NOTE — PROGRESS NOTES
Patient ID: Umu Clayton  YOB: 1960  MRN: [id number]    Chief Complaint: Pain of the Left Shoulder      History of Present Illness: Umu Clayton is a right-hand dominant 65 y.o. female who presents today with chronic left shoulder pain.     History of Present Illness    HPI:  Umu presents with left shoulder pain ongoing for approximately 2.5 months. Pain is localized to the left shoulder, with a palpable knot in the area, radiating down the arm and back with varying intensity. It is constant in the shoulder but intermittently extends to other regions. Pain worsens with certain movements, particularly when raising the arm. To alleviate discomfort, she often has to support the affected arm with the other hand.    Sleep is impacted, as she habitually sleeps on the left side and frequently wakes up in that position despite attempts to avoid it. She denies any specific injury that might have precipitated the shoulder issue or any significant shoulder pain when showering.    For pain management, she has been using Voltaren (diclofenac) topically, though caution is required due to a history of ulcerative colitis.    She recently underwent a hip replacement performed by Dr. Mackey, which is reportedly doing well. However, knee issues are now becoming apparent. She reports a history of knee injections, including gel shots and a particularly expensive injection (approximately $2,000) that provided significant relief for the knee. She tried to work through shoulder pain on her own for about 2.5 months with no improvement  She has used Voltaren (topical diclofenac) for shoulder pain  She received gel shots in the knee in three different stages from a previous provider, which provided significant benefit  She received an expensive ($2000+) gel shot in the knee which has helped reduce knee pain significantly    MEDICATIONS:  - Voltaren (topical diclofenac): used for shoulder pain    HISTORY:  - Hip replacement:  performed by Dr. Mackey  - Marital Status:        ]    Past Medical History:   Past Medical History:   Diagnosis Date    Anxiety     Emphysema lung     GERD (gastroesophageal reflux disease)     History of stomach ulcers     Hypertension     Osteoarthritis of hip     Restless leg syndrome     Ulcerative colitis      Past Surgical History:   Procedure Laterality Date    gastric by pass      HERNIA REPAIR      HIP REPLACEMENT ARTHROPLASTY      HYSTERECTOMY      INJECTION OF JOINT Left 2024    Procedure: Left IA Hip Joint injection;  Surgeon: Mario Alberto Eagle MD;  Location: [facility];  Service: Pain Management;  Laterality: Left;    OOPHORECTOMY Right      Family History   Problem Relation Name Age of Onset    COPD Father Ced Ely     Lung cancer Father Ced Ely     Colon cancer Maternal Grandmother Sara vizcarra         Colon cancer    COPD Mother Ethel Ely             Multiple sclerosis Son      Heart disease Neg Hx      Stroke Neg Hx       Social History[1]  Medication List with Changes/Refills   Current Medications    ALBUTEROL-IPRATROPIUM (DUO-NEB) 2.5 MG-0.5 MG/3 ML NEBULIZER SOLUTION    Take 3 mLs by nebulization every 6 (six) hours as needed for Wheezing or Shortness of Breath. Rescue    BACLOFEN (LIORESAL) 10 MG TABLET    Take 1 tablet (10 mg total) by mouth nightly as needed (msucle pain).    BUSPIRONE (BUSPAR) 5 MG TAB    Take 1 tablet (5 mg total) by mouth 2 (two) times daily.    CETIRIZINE (ZYRTEC) 10 MG TABLET    Take 1 tablet (10 mg total) by mouth once daily.    FLUTICASONE PROPIONATE (FLONASE) 50 MCG/ACTUATION NASAL SPRAY    2 sprays (100 mcg total) by Each Nostril route once daily.    FOLIC ACID (FOLVITE) 1 MG TABLET    Take 1 tablet (1 mg total) by mouth once daily.    FUROSEMIDE (LASIX) 40 MG TABLET    TAKE 1 TABLET DAILY    GABAPENTIN (NEURONTIN) 100 MG CAPSULE    Take 1 capsule (100 mg total) by mouth 3 (three) times daily.    LEVOCETIRIZINE (XYZAL) 5  MG TABLET    Take 1 tablet (5 mg total) by mouth every evening.    LINZESS 145 MCG CAP CAPSULE    Take 145 mcg by mouth once daily.    LISINOPRIL-HYDROCHLOROTHIAZIDE (PRINZIDE,ZESTORETIC) 20-12.5 MG PER TABLET    Hold until BP>130/90. When you start, take 2 tab daily    MESALAMINE (LIALDA) 1.2 GRAM TBEC    Take 2.4 g by mouth daily with breakfast. 2 tablets    ONDANSETRON (ZOFRAN-ODT) 4 MG TBDL    Take 1 tablet (4 mg total) by mouth every 8 (eight) hours as needed (nausea and vomiting).    RABEPRAZOLE (ACIPHEX) 20 MG TABLET    Take 20 mg by mouth once daily.    ROPINIROLE (REQUIP) 0.5 MG TABLET    TAKE 1 TABLET EVERY EVENING    SERTRALINE (ZOLOFT) 100 MG TABLET    TAKE 2 TABLETS ONCE DAILY    SUCRALFATE (CARAFATE) 1 GRAM TABLET    Take 1 g by mouth 2 (two) times daily.    TRELEGY ELLIPTA 200-62.5-25 MCG INHALER    USE 1 INHALATION DAILY     Review of patient's allergies indicates:   Allergen Reactions    Penicillins Hives       Physical Exam:   There is no height or weight on file to calculate BMI.    Physical Exam      Detailed MSK exam:   Ortho/SPM Exam     Left Shoulder:  Inspection: Normal  Palpation: Tenderness to palpation Lateral Subacromial space , Anterior subacromial Space, and Posterior Subacromial Space  Range of motion: 170 deg Flexion         80 deg External Rotation in Adduction         IR to Lumbar  Strength:  5-/5 Abduction    5-5/5 External Rotation in Adduction    -/5 Internal Rotation   Special Tests: Positive Torres-Kane    Positive Neer's    Positive Speed's    Equivocal Chepachet's  Negative drop-arm   N/V Exam:  Radial: Normal motor (EPL/thumbs up)              Normal sensory (dorsal hand)   Median: Normal motor (FPL/A-OK)      Normal sensory (thumb)   Ulnar:  Normal motor (Interossei/scissors-spread)     Normal sensory (5th finger)   LABC: Normal sensory (lateral forearm)   MABC: Normal sensory (medial forearm)   MC: Normal motor (elbow flexion)   Axillary: Normal motor/sensory  (deltoid)  Normal radial and ulnar pulses, warm and well perfused with capillary refill < 2 sec       Imaging:  XR left shoulder - 07/18/2025    Relevant imaging results were reviewed and interpreted by me and per my read mild degenerative changes about the left shoulder including the glenohumeral and acromioclavicular joints.  Well-maintained joint spaces.  Normal alignment.  No calcific tendinopathy.  No fractures or other acute abnormalities..      This was discussed with the patient and / or family today.       Patient Instructions   Assessment:  Umu Clayton is a 65 y.o. female with a chief complaint of Pain of the Left Shoulder    Encounter Diagnoses   Name Primary?    Chronic pain in left shoulder Yes    Periscapular pain of left shoulder     Tendinopathy of left rotator cuff     Impingement syndrome of left shoulder region       Plan:  X-rays reviewed - mild degenerative changes about left shoulder.    Labs reviewed - Cr 0.7, GFR > 60, LFTs WNL  History and clinical exam is consistent with chronic left shoulder pain due to subacromial impingement syndrome and rotator cuff tendinopathy.  No evidence of larger high-grade rotator cuff injury or tear.    Recommend conservative management.    Subacromial bursa corticosteroid injection today.    Proper protocols after the injection included: no submerging pools, baths tubs, or hot tubs for 24 hr.  Showering is okay today.  Side effects of the corticosteroid injection can include elevated blood glucose levels and blood pressures, so if you are taking medications for these, please monitor closely, and contact your PCP if any issues.  Red flag symptoms include fever, chills, nausea, vomiting, red, warm, tender joint at the area of injection.  If you are noticing these symptoms, they may be indicative of an infection, and please seek medical care immediately, either by calling our clinic or going to the emergency room.  Pain persists, may warrant physical  therapy.    Follow-up:  As needed or sooner if there are any problems between now and then.    Thank you for choosing [facility] and Dr. Hola Duke for your orthopedic & sports medicine care. It is our goal to provide you with exceptional care that will help keep you healthy, active, and get you back in the game.    Please do not hesitate to reach out to us via email, phone, or MyChart with any questions, concerns, or feedback.    If you are experiencing pain/discomfort, or have questions after 5pm and would like to be connected to the [facility] on-call team, please call this number and specify which Sports Medicine provider is treating you: [phone]  During business hours, before 5 PM, if you have any questions or concerns, please call this number and as to speak with a member of Dr Duke's team: [phone]        Hola Duke MD  Primary Care Sports Medicine      Disclaimer: This note was prepared using a voice recognition system and is likely to have sound alike errors within the text.     This includes face to face time and non-face to face time preparing to see the patient (eg, review of tests), obtaining and/or reviewing separately obtained history, documenting clinical information in the electronic or other health record, independently interpreting results and communicating results to the patient/family/caregiver, or care coordinator.           [1]   Social History  Socioeconomic History    Marital status:    Tobacco Use    Smoking status: Every Day     Current packs/day: 2.50     Average packs/day: 2.5 packs/day for 50.5 years (126.4 ttl pk-yrs)     Types: Cigarettes     Start date: 1975    Smokeless tobacco: Never    Tobacco comments:     Enrolled in the [employer] on 7/1/21 ([facility] Member ID # [id number]). Ambulatory referral to Smoking Cessation program.    Substance and Sexual Activity    Alcohol use: Never    Drug use: No     Sexual activity: Yes     Partners: Male     Birth control/protection: None     Social Drivers of Health     Financial Resource Strain: Medium Risk (5/17/2025)    Overall Financial Resource Strain (CARDIA)     Difficulty of Paying Living Expenses: Somewhat hard   Food Insecurity: No Food Insecurity (5/17/2025)    Hunger Vital Sign     Worried About Running Out of Food in the Last Year: Never true     Ran Out of Food in the Last Year: Never true   Transportation Needs: No Transportation Needs (5/17/2025)    PRAPARE - Transportation     Lack of Transportation (Medical): No     Lack of Transportation (Non-Medical): No   Physical Activity: Inactive (5/17/2025)    Exercise Vital Sign     Days of Exercise per Week: 0 days     Minutes of Exercise per Session: 0 min   Stress: Stress Concern Present (5/17/2025)    [employer] - Occupational Stress Questionnaire     Feeling of Stress : Very much   Housing Stability: High Risk (5/17/2025)    Housing Stability Vital Sign     Unable to Pay for Housing in the Last Year: Yes     Number of Times Moved in the Last Year: 0     Homeless in the Last Year: No

## 2025-07-18 NOTE — PROCEDURES
Large Joint Aspiration/Injection: L subacromial bursa    Date/Time: 7/18/2025 2:20 PM    Performed by: Hola Duke MD  Authorized by: Hola Duke MD    Consent Done?:  Yes (Verbal)  Indications:  Diagnostic evaluation and pain  Site marked: the procedure site was marked    Timeout: prior to procedure the correct patient, procedure, and site was verified      Local anesthesia used?: Yes    Anesthesia:  Local infiltration  Local anesthetic:  Bupivacaine 0.5% without epinephrine, lidocaine 1% without epinephrine and topical anesthetic  Anesthetic total (ml):  4      Details:  Needle Size:  21 G  Ultrasonic Guidance for needle placement?: Yes    Images are saved and documented.  Approach:  Lateral  Location:  Shoulder  Site:  L subacromial bursa  Medications:  6 mg betamethasone acetate-betamethasone sodium phosphate 6 mg/mL  Patient tolerance:  Patient tolerated the procedure well with no immediate complications     Ultrasound guidance was used for needle localization. Images were saved and stored for documentation. The appropriate structures were visualized. Dynamic visualization of the needle was continuous throughout the procedures and maintained good position.     We discussed the proper protocols after the injection such as no submerging pools, baths tubs, or hot tubs for 24 hr.  Showering is okay today.  We also discussed that blood sugars can be elevated after an injection and asked patient to properly check their sugars over the next few days and contact their PCP if there are any concerns.  We discussed red flags such as fevers, chills, red, warm, tender joint at the area of injection to please seek medical care immediately.

## 2025-07-28 RX ORDER — DICLOFENAC SODIUM 75 MG/1
75 TABLET, DELAYED RELEASE ORAL 2 TIMES DAILY WITH MEALS
Qty: 60 TABLET | Refills: 0 | Status: SHIPPED | OUTPATIENT
Start: 2025-07-28

## 2025-07-29 ENCOUNTER — PATIENT MESSAGE (OUTPATIENT)
Dept: FAMILY MEDICINE | Facility: CLINIC | Age: 65
End: 2025-07-29
Payer: COMMERCIAL

## 2025-07-30 RX ORDER — BUSPIRONE HYDROCHLORIDE 10 MG/1
10 TABLET ORAL 2 TIMES DAILY
Qty: 60 TABLET | Refills: 11 | Status: SHIPPED | OUTPATIENT
Start: 2025-07-30

## 2025-08-08 ENCOUNTER — OFFICE VISIT (OUTPATIENT)
Dept: FAMILY MEDICINE | Facility: CLINIC | Age: 65
End: 2025-08-08
Payer: COMMERCIAL

## 2025-08-08 VITALS
HEIGHT: 67 IN | HEART RATE: 83 BPM | BODY MASS INDEX: 44.02 KG/M2 | OXYGEN SATURATION: 95 % | SYSTOLIC BLOOD PRESSURE: 130 MMHG | DIASTOLIC BLOOD PRESSURE: 70 MMHG | WEIGHT: 280.44 LBS | TEMPERATURE: 98 F

## 2025-08-08 DIAGNOSIS — I10 HYPERTENSION, UNSPECIFIED TYPE: ICD-10-CM

## 2025-08-08 DIAGNOSIS — N64.52 BLOODY DISCHARGE FROM LEFT NIPPLE: Primary | ICD-10-CM

## 2025-08-08 DIAGNOSIS — Z79.899 OTHER LONG TERM (CURRENT) DRUG THERAPY: ICD-10-CM

## 2025-08-08 PROCEDURE — 99999 PR PBB SHADOW E&M-EST. PATIENT-LVL V: CPT | Mod: PBBFAC,,, | Performed by: FAMILY MEDICINE

## 2025-08-08 NOTE — PROGRESS NOTES
(Portions of this note were dictated using voice recognition software and may contain dictation related errors in spelling/grammar/syntax not found on text review)    CC:   Chief Complaint   Patient presents with    Breast Problem     Breast nipple is swelling when squeezed a pink pussy type of fluid came out pt stated its been hurting.       HPI: 65 y.o. female urgent care visit for left nipple swelling, has noticed some bloody pink nipple discharge upon compression.  States that the area was more red and swollen but the redness and swelling has gone down.  Nipple still feels tender.  Both breasts feel somewhat tender.  Denies any lymphadenopathy.  No fevers or chills.        Past Medical History:   Diagnosis Date    Anxiety     Emphysema lung     GERD (gastroesophageal reflux disease)     History of stomach ulcers     Hypertension     Osteoarthritis of hip     Restless leg syndrome     Ulcerative colitis        Past Surgical History:   Procedure Laterality Date    gastric by pass      HERNIA REPAIR      HIP REPLACEMENT ARTHROPLASTY      HYSTERECTOMY      INJECTION OF JOINT Left 2024    Procedure: Left IA Hip Joint injection;  Surgeon: Mario Alberto Eagle MD;  Location: [facility];  Service: Pain Management;  Laterality: Left;    OOPHORECTOMY Right        Family History   Problem Relation Name Age of Onset    COPD Father Ced Ely     Lung cancer Father Ced Ely     Colon cancer Maternal Grandmother Sara vizcarra         Colon cancer    COPD Mother Ethel Ely             Multiple sclerosis Son      Heart disease Neg Hx      Stroke Neg Hx         Social History[1]    Lab Results   Component Value Date    WBC 10.03 2025    HGB 9.6 (L) 2025    HCT 31.9 (L) 2025    MCV 77 (L) 2025     2025    CHOL 168 2025    TRIG 70 2025    HDL 77 (H) 2025    ALT 12 2025    AST 14 (L) 2025    BILITOT 0.2 2025    ALKPHOS 90  "06/17/2025     06/17/2025    K 3.8 06/17/2025     06/17/2025    CREATININE 0.7 06/17/2025    ESTGFRAFRICA >60 08/26/2021    EGFRNONAA >60 08/26/2021    EGFRNORACEVR >60 06/17/2025    CALCIUM 8.8 06/17/2025    ALBUMIN 3.6 06/17/2025    BUN 13 06/17/2025    CO2 24 06/17/2025    TSH 3.400 06/04/2025    HGBA1C 5.9 (H) 06/04/2025    LDLCALC 77.0 06/04/2025     06/17/2025                     Vital signs reviewed  Vitals:    08/08/25 1541   BP: 130/70   BP Location: Left arm   Patient Position: Sitting   Pulse: 83   Temp: 98.2 °F (36.8 °C)   TempSrc: Oral   SpO2: 95%   Weight: 127.2 kg (280 lb 6.8 oz)   Height: 5' 7" (1.702 m)       Wt Readings from Last 4 Encounters:   08/08/25 127.2 kg (280 lb 6.8 oz)   06/17/25 124.7 kg (275 lb)   05/19/25 125 kg (275 lb 9.2 oz)   12/28/24 123.4 kg (272 lb)       PE:   APPEARANCE: Well nourished, well developed, in no acute distress.    HEAD: Normocephalic, atraumatic.  BREASTS:  Does have some fibrocystic change bilaterally which is somewhat tender to palpate diffusely.  There is some scant serosanguineous nipple discharge noted on left side.  No defined mass palpated underneath the nipple.  No signs of skin abscess or ulceration.  No axillary lymphadenopathy.      IMPRESSION  1. Bloody discharge from left nipple    2. Hypertension, unspecified type    3. Other long term (current) drug therapy            PLAN  Orders Placed This Encounter   Procedures    Mammo Digital Diagnostic Bilat with Hong (XPD)    US Breast Left Limited    TSH    Prolactin         Will update diagnostic mammogram and left breast ultrasound, differentials can include intraductal papilloma, malignancy, infection although no real other skin changes at this time that look indicative of infection..  Has diclofenac she can take for pain.  Will notify on the results of the above testing, if noncontributory may need to visit with breast clinic in case further advanced imaging like MRI as necessary to " further delineate pathology.  Also needs her labs updated given anemia and elevated A1c.  Will add TSH and prolactin (she is currently on dopamine agonist with Requip daily for restless leg)                       [1]   Social History  Tobacco Use    Smoking status: Every Day     Current packs/day: 2.50     Average packs/day: 2.5 packs/day for 50.6 years (126.5 ttl pk-yrs)     Types: Cigarettes     Start date: 1975    Smokeless tobacco: Never    Tobacco comments:     Enrolled in the [employer] on 7/1/21 ([facility] Member ID # [id number]). Ambulatory referral to Smoking Cessation program.    Substance Use Topics    Alcohol use: Never    Drug use: No

## 2025-08-29 ENCOUNTER — ON-DEMAND VIRTUAL (OUTPATIENT)
Dept: URGENT CARE | Facility: CLINIC | Age: 65
End: 2025-08-29
Payer: COMMERCIAL

## 2025-08-29 DIAGNOSIS — J06.9 URI WITH COUGH AND CONGESTION: Primary | ICD-10-CM

## 2025-08-29 RX ORDER — CHLOPHEDIANOL HCL AND PYRILAMINE MALEATE 12.5; 12.5 MG/5ML; MG/5ML
5 SOLUTION ORAL EVERY 8 HOURS PRN
Qty: 473 ML | Refills: 0 | Status: SHIPPED | OUTPATIENT
Start: 2025-08-29

## 2025-08-29 RX ORDER — ONDANSETRON 4 MG/1
4 TABLET, ORALLY DISINTEGRATING ORAL EVERY 8 HOURS PRN
Qty: 20 TABLET | Refills: 0 | Status: SHIPPED | OUTPATIENT
Start: 2025-08-29

## 2025-08-29 RX ORDER — CHLOPHEDIANOL HCL AND PYRILAMINE MALEATE 12.5; 12.5 MG/5ML; MG/5ML
5 SOLUTION ORAL EVERY 8 HOURS PRN
Qty: 118 ML | Refills: 0 | Status: SHIPPED | OUTPATIENT
Start: 2025-08-29 | End: 2025-08-29

## 2025-08-29 RX ORDER — AZELASTINE 1 MG/ML
1 SPRAY, METERED NASAL 2 TIMES DAILY
Qty: 30 ML | Refills: 0 | Status: SHIPPED | OUTPATIENT
Start: 2025-08-29

## 2025-09-03 ENCOUNTER — HOSPITAL ENCOUNTER (OUTPATIENT)
Dept: RADIOLOGY | Facility: HOSPITAL | Age: 65
Discharge: HOME | End: 2025-09-03
Attending: FAMILY MEDICINE
Payer: COMMERCIAL

## 2025-09-03 DIAGNOSIS — N64.52 BLOODY DISCHARGE FROM LEFT NIPPLE: ICD-10-CM

## 2025-09-03 PROCEDURE — 76642 ULTRASOUND BREAST LIMITED: CPT | Mod: TC,LT

## 2025-09-03 PROCEDURE — 76642 ULTRASOUND BREAST LIMITED: CPT | Mod: 26,LT,, | Performed by: RADIOLOGY

## 2025-09-03 PROCEDURE — 77062 BREAST TOMOSYNTHESIS BI: CPT | Mod: 26,,, | Performed by: RADIOLOGY

## 2025-09-03 PROCEDURE — 77066 DX MAMMO INCL CAD BI: CPT | Mod: 26,,, | Performed by: RADIOLOGY

## 2025-09-03 PROCEDURE — 77062 BREAST TOMOSYNTHESIS BI: CPT | Mod: TC
